# Patient Record
Sex: FEMALE | NOT HISPANIC OR LATINO | Employment: UNEMPLOYED | URBAN - METROPOLITAN AREA
[De-identification: names, ages, dates, MRNs, and addresses within clinical notes are randomized per-mention and may not be internally consistent; named-entity substitution may affect disease eponyms.]

---

## 2017-08-02 LAB — HUMAN PAPILLOMAVIRUS (HPV): NORMAL

## 2018-09-27 ENCOUNTER — CLINICAL SUPPORT (OUTPATIENT)
Dept: URGENT CARE | Facility: CLINIC | Age: 63
End: 2018-09-27
Payer: COMMERCIAL

## 2018-09-27 VITALS
HEART RATE: 67 BPM | OXYGEN SATURATION: 98 % | WEIGHT: 187 LBS | SYSTOLIC BLOOD PRESSURE: 132 MMHG | TEMPERATURE: 99 F | DIASTOLIC BLOOD PRESSURE: 77 MMHG | RESPIRATION RATE: 14 BRPM | BODY MASS INDEX: 28.34 KG/M2 | HEIGHT: 68 IN

## 2018-09-27 DIAGNOSIS — L01.00 IMPETIGO: ICD-10-CM

## 2018-09-27 DIAGNOSIS — L01.00 IMPETIGO ANY SITE: Primary | ICD-10-CM

## 2018-09-27 PROCEDURE — 99203 OFFICE O/P NEW LOW 30 MIN: CPT | Mod: S$GLB,,, | Performed by: NURSE PRACTITIONER

## 2018-09-27 RX ORDER — SULFAMETHOXAZOLE AND TRIMETHOPRIM 800; 160 MG/1; MG/1
1 TABLET ORAL 2 TIMES DAILY
Qty: 20 TABLET | Refills: 0 | Status: SHIPPED | OUTPATIENT
Start: 2018-09-27 | End: 2018-10-07

## 2018-09-27 RX ORDER — MUPIROCIN CALCIUM 20 MG/G
CREAM TOPICAL
Qty: 30 G | Refills: 0 | Status: SHIPPED | OUTPATIENT
Start: 2018-09-27 | End: 2019-08-01

## 2018-09-27 NOTE — PATIENT INSTRUCTIONS
"  Impetigo  Impetigo is a common bacterial infection of the skin that can appear on many parts of the body. It can happen to anyone, of any age, but is more common in children. For this reason, it used to be called "school sores."  Causes  Its normal to get scrapes on your body from activity or from scratching your skin. The skin normally has bacteria on it. Sometimes an impetigo infection can start on healthy skin. But it usually starts when there is an injury to the skin, or break in the skin. Although nothing usually happens, the bacteria normally on the skin can cause infection. This is the most common way people get impetigo.  Impetigo is very contagious. So once there is an infection, it needs to be treated so it doesn't get worse, spread to other areas, or to other people. Impetigo can easily be passed to other family members, friends, schoolmates, or co-workers, through scratching, rubbing, or touching an infected area. Common causes include:  · After a cold  · Bites  · From another infected person  · Injury to skin  · Insect bites  · Other skin problems that are infected, such as eczema  · Scratches  Symptoms  There is often a skin injury like a scratch, scrape, or insect bite that may have gone unnoticed or been ignored before the infection began. Symptoms of impetigo include:  · Red, inflamed area or rash  · One or many red bumps  · Bumps that turn into blisters filled with yellow fluid or pus  · Blisters break or leak causing honey-colored crusting or scabbing over the area  · Skin sores that spread to other surrounding areas  Home care  The following guidelines will help you care for your infection at home.  Wound care  · Trim fingernails and cover sores with an adhesive bandage, if needed, to prevent scratching. Picking at the sores may leave a scar.  · If the infection is on or around your lips, don't lick or chew on the sores. This will make the infection worse.  · If a bandage or dressing is used, " you can put a nonstick dressing over it.  · Wash your hands and your childs hands often. This will avoid spreading the infection to other parts of the body and to other people. Do not share the infected persons washcloths, towels, pillows, sheets, or clothes with others. Wash these items in hot water before using again.  · Clean the area several times a day. You dont want to scrub the area. The best way to do this is to soak the sores in warm, soapy water until they get soft enough to be wiped away. This will help remove the crust that forms from the dried liquid. In areas that you cant soak, like the mouth or face, you can put a clean, warm washcloth over the infected are for 5 to 10 minutes at a time, until the scabs soften enough to remove.  Medicines  · You can use over-the-counter medicine as directed based on age and weight for pain, fever, fussiness, or discomfort, unless another medicine was prescribed. In infants ages 6 months and older, you may use ibuprofen as well as acetaminophen. You can alternate them, or use both together. They work differently and are a different class of medicines, so taking them together is not an overdose. If you or your child has chronic liver or kidney disease or ever had a stomach ulcer or gastrointestinal bleeding, talk with your healthcare provider before using these medicines. Also talk with your healthcare provider if your child is taking blood-thinner medicines.  · Do not give aspirin to your child. Aspirin should never be used in children ages 18 and younger who is ill with a fever. It may cause severe disease or death.   · Impetigo can often be cured with topical creams. Apply these as directed by your healthcare provider.  · If you were given oral antibiotics, take them until they are used up. It is important to finish the antibiotics even if the wound looks better to make sure the infection has cleared.  Follow-up care  Follow up with your healthcare provider if  the sores continue to spread after 3 days of treatment. It will take about 7 to 10 days to heal completely.  Your child should stay out of school until completing 2 full days of antibiotic treatment.  When to seek medical advice  Call your healthcare provider right away if any of the following occur:  · Fever of 100.4°F (38°C) or higher, or as directed  · Increased amounts of fluid or pus coming from the sores  · Increasing number of sores or spreading areas of redness after 2 days of treatment with antibiotics  · Increasing swelling or pain  · Loss of appetite or vomiting  · Unusual drowsiness, weakness, or change in behavior  Date Last Reviewed: 8/1/2016 © 2000-2017 [employer]. [address] [place] [address], [place] [address]. All rights reserved. This information is not intended as a substitute for professional medical care. Always follow your healthcare professional's instructions.

## 2018-09-27 NOTE — PROGRESS NOTES
"Subjective:       Patient ID: Bailey Joseph is a 63 y.o. female.    Vitals:  height is 5' 8" (1.727 m) and weight is 84.8 kg (187 lb). Her oral temperature is 98.6 °F (37 °C). Her blood pressure is 132/77 and her pulse is 67. Her respiration is 14 and oxygen saturation is 98%.     Chief Complaint: Rash (exposed to impetigo, has lesion under right arm)    Rash   This is a new problem. The current episode started yesterday. The problem is unchanged. The affected locations include the right axilla. Pertinent negatives include no fever, joint pain, shortness of breath or sore throat.   Recently exposed to impetigo  Review of Systems   Constitution: Negative for chills and fever.   HENT: Negative for sore throat.    Respiratory: Negative for shortness of breath.    Skin: Positive for rash. Negative for itching.   Musculoskeletal: Negative for joint pain.       Objective:      Physical Exam   Constitutional: She is oriented to person, place, and time. She appears well-developed and well-nourished.   HENT:   Head: Normocephalic and atraumatic. Head is without abrasion, without contusion and without laceration.   Right Ear: External ear normal.   Left Ear: External ear normal.   Nose: Nose normal.   Mouth/Throat: Oropharynx is clear and moist.   Eyes: Conjunctivae, EOM and lids are normal. Pupils are equal, round, and reactive to light.   Neck: Trachea normal, full passive range of motion without pain and phonation normal. Neck supple.   Cardiovascular: Normal rate, regular rhythm and normal heart sounds.   Pulmonary/Chest: Effort normal and breath sounds normal. No stridor. No respiratory distress.   Musculoskeletal: Normal range of motion.   Neurological: She is alert and oriented to person, place, and time.   Skin: Skin is warm, dry and intact. Capillary refill takes less than 2 seconds. No abrasion, no bruising, no burn, no ecchymosis, no laceration, no lesion and no rash noted. No erythema.   Small erythematous vesicle " to right lateral chest wall. Non painful.   Psychiatric: She has a normal mood and affect. Her speech is normal and behavior is normal. Judgment and thought content normal. Cognition and memory are normal.   Nursing note and vitals reviewed.      Assessment:       No diagnosis found.    Plan:         There are no diagnoses linked to this encounter.

## 2019-08-01 ENCOUNTER — DOCUMENTATION ONLY (OUTPATIENT)
Dept: FAMILY MEDICINE | Facility: CLINIC | Age: 64
End: 2019-08-01

## 2019-08-01 ENCOUNTER — OFFICE VISIT (OUTPATIENT)
Dept: FAMILY MEDICINE | Facility: CLINIC | Age: 64
End: 2019-08-01
Payer: COMMERCIAL

## 2019-08-01 VITALS
OXYGEN SATURATION: 98 % | WEIGHT: 194 LBS | HEIGHT: 68 IN | SYSTOLIC BLOOD PRESSURE: 108 MMHG | TEMPERATURE: 98 F | DIASTOLIC BLOOD PRESSURE: 62 MMHG | HEART RATE: 88 BPM | BODY MASS INDEX: 29.4 KG/M2

## 2019-08-01 DIAGNOSIS — R19.4 CHANGE IN BOWEL HABITS: ICD-10-CM

## 2019-08-01 DIAGNOSIS — Z13.228 SCREENING FOR ENDOCRINE, NUTRITIONAL, METABOLIC AND IMMUNITY DISORDER: ICD-10-CM

## 2019-08-01 DIAGNOSIS — Z23 NEED FOR DIPHTHERIA-TETANUS-PERTUSSIS (TDAP) VACCINE: ICD-10-CM

## 2019-08-01 DIAGNOSIS — Z13.1 DIABETES MELLITUS SCREENING: ICD-10-CM

## 2019-08-01 DIAGNOSIS — Z13.0 SCREENING FOR IRON DEFICIENCY ANEMIA: ICD-10-CM

## 2019-08-01 DIAGNOSIS — Z12.39 BREAST CANCER SCREENING: ICD-10-CM

## 2019-08-01 DIAGNOSIS — Z13.220 ENCOUNTER FOR LIPID SCREENING FOR CARDIOVASCULAR DISEASE: ICD-10-CM

## 2019-08-01 DIAGNOSIS — Z13.29 SCREENING FOR ENDOCRINE, NUTRITIONAL, METABOLIC AND IMMUNITY DISORDER: ICD-10-CM

## 2019-08-01 DIAGNOSIS — Z13.21 SCREENING FOR ENDOCRINE, NUTRITIONAL, METABOLIC AND IMMUNITY DISORDER: ICD-10-CM

## 2019-08-01 DIAGNOSIS — Z00.00 PHYSICAL EXAM, ROUTINE: Primary | ICD-10-CM

## 2019-08-01 DIAGNOSIS — Z11.59 ENCOUNTER FOR HEPATITIS C SCREENING TEST FOR LOW RISK PATIENT: ICD-10-CM

## 2019-08-01 DIAGNOSIS — Z13.6 ENCOUNTER FOR LIPID SCREENING FOR CARDIOVASCULAR DISEASE: ICD-10-CM

## 2019-08-01 DIAGNOSIS — Z13.0 SCREENING FOR ENDOCRINE, NUTRITIONAL, METABOLIC AND IMMUNITY DISORDER: ICD-10-CM

## 2019-08-01 DIAGNOSIS — Z23 NEED FOR SHINGLES VACCINE: ICD-10-CM

## 2019-08-01 PROCEDURE — 99386 PR PREVENTIVE VISIT,NEW,40-64: ICD-10-PCS | Mod: 25,S$GLB,, | Performed by: NURSE PRACTITIONER

## 2019-08-01 PROCEDURE — 99386 PREV VISIT NEW AGE 40-64: CPT | Mod: 25,S$GLB,, | Performed by: NURSE PRACTITIONER

## 2019-08-01 PROCEDURE — 90715 TDAP VACCINE 7 YRS/> IM: CPT | Mod: S$GLB,,, | Performed by: NURSE PRACTITIONER

## 2019-08-01 PROCEDURE — 90715 TDAP VACCINE GREATER THAN OR EQUAL TO 7YO IM: ICD-10-PCS | Mod: S$GLB,,, | Performed by: NURSE PRACTITIONER

## 2019-08-01 PROCEDURE — 90471 IMMUNIZATION ADMIN: CPT | Mod: S$GLB,,, | Performed by: NURSE PRACTITIONER

## 2019-08-01 PROCEDURE — 90471 TDAP VACCINE GREATER THAN OR EQUAL TO 7YO IM: ICD-10-PCS | Mod: S$GLB,,, | Performed by: NURSE PRACTITIONER

## 2019-08-01 NOTE — PROGRESS NOTES
Subjective:       Patient ID: Bailey Joseph is a 64 y.o. female.    Chief Complaint: Annual Exam  New patient who is here for a wellness exam. She has never been seen at [facility] or myself.   She has no complaints at this time, but does have an extensive family history of multiple diseases including cancer and diabetes. She would like to have her annual labs done. In the past she had a low white blood cell count, but was not found to have any major illness at [facility] related to the low WBCs. Has a history of breast problems, has a marker where they did a biopsy in the past, but last mammogram was normal, is due now. She had a pap about a year and a half ago and it was normal (with Dr. John). She has never had a colonoscopy. She has always had daily bowel movements, but in the past few weeks she has had a change in bowel movements, going from diarrhea to constipation and back.   HPI  Review of Systems   Constitutional: Negative for fatigue, fever and unexpected weight change.   HENT: Positive for hearing loss and tinnitus. Negative for congestion and sore throat.    Eyes: Negative for pain, redness and visual disturbance.   Respiratory: Negative for cough and shortness of breath.    Cardiovascular: Positive for leg swelling. Negative for chest pain.   Gastrointestinal: Positive for constipation and diarrhea. Negative for nausea and vomiting.   Endocrine: Negative for cold intolerance and heat intolerance.   Genitourinary: Negative for dysuria and hematuria.   Musculoskeletal: Negative for arthralgias and myalgias.   Skin: Negative for pallor and rash.   Neurological: Negative for dizziness and headaches.   Psychiatric/Behavioral: Negative for dysphoric mood. The patient is not nervous/anxious.        Objective:      Physical Exam   Constitutional: She is oriented to person, place, and time. She appears well-developed and well-nourished. No distress.   HENT:   Head: Normocephalic and atraumatic.    Right Ear: External ear normal.   Left Ear: External ear normal.   Mouth/Throat: Oropharynx is clear and moist. No oropharyngeal exudate.   TMs pearly grey with light reflex bilaterally.      Eyes: Pupils are equal, round, and reactive to light. Conjunctivae and EOM are normal. Right eye exhibits no discharge. Left eye exhibits no discharge. No scleral icterus.   Neck: Normal range of motion. Neck supple.   Cardiovascular: Normal rate, regular rhythm, normal heart sounds and intact distal pulses. Exam reveals no gallop and no friction rub.   No murmur heard.  Pulmonary/Chest: Effort normal and breath sounds normal. No stridor. No respiratory distress. She has no wheezes. She has no rales. She exhibits no tenderness.   Abdominal: Soft. Bowel sounds are normal. She exhibits no distension and no mass. There is no tenderness. There is no rebound and no guarding.   Musculoskeletal: She exhibits edema.   1+ non-pitting edema bilateral lower legs.    Neurological: She is alert and oriented to person, place, and time.   Skin: Skin is warm and dry. Capillary refill takes less than 2 seconds. No rash noted. She is not diaphoretic. No erythema. No pallor.   Psychiatric: She has a normal mood and affect. Her behavior is normal.   Nursing note and vitals reviewed.      Assessment:     This office visit cannot be billed incident to as it does not meet the needed criteria.     1. Physical exam, routine    2. Need for diphtheria-tetanus-pertussis (Tdap) vaccine    3. Encounter for hepatitis C screening test for low risk patient    4. Screening for endocrine, nutritional, metabolic and immunity disorder    5. Diabetes mellitus screening    6. Encounter for lipid screening for cardiovascular disease    7. Screening for iron deficiency anemia    8. Change in bowel habits    9. Breast cancer screening    10. Need for shingles vaccine        Plan:       Physical exam, routine    Need for diphtheria-tetanus-pertussis (Tdap) vaccine  -      (In Office Administered) Tdap Vaccine    Encounter for hepatitis C screening test for low risk patient  -     Hepatitis C antibody; Future; Expected date: 08/01/2019    Screening for endocrine, nutritional, metabolic and immunity disorder  -     TSH; Future; Expected date: 08/01/2019  -     Comprehensive metabolic panel; Future; Expected date: 08/01/2019    Diabetes mellitus screening  -     Hemoglobin A1c; Future; Expected date: 08/01/2019    Encounter for lipid screening for cardiovascular disease  -     Lipid panel; Future; Expected date: 08/01/2019    Screening for iron deficiency anemia  -     CBC auto differential; Future; Expected date: 08/01/2019    Change in bowel habits  -     Ambulatory referral to Gastroenterology    Breast cancer screening  -     Mammo Digital Screening Bilat; Future; Expected date: 08/01/2019    Need for shingles vaccine  -     varicella-zoster gE vac,2 of 2 50 mcg SusR; Inject 0.5 mLs into the muscle once. for 1 dose  Dispense: 1 each; Refill: 0  -     adjuvant AS01B, PF,vial 1 of 2 Susp; Inject 0.5 mLs into the muscle once. for 1 dose  Dispense: 0.5 mL; Refill: 0         Work on losing 20 pounds, slow and steady, 1-2 pounds a week.

## 2019-08-01 NOTE — PROGRESS NOTES
Health Maintenance Due   Topic Date Due    Hepatitis C Screening  1955    Lipid Panel  1955    TETANUS VACCINE  06/10/1975    Pap Smear with HPV Cotest  06/10/1978    Mammogram  06/10/1997    Colonoscopy  06/10/2007

## 2019-08-02 ENCOUNTER — LAB VISIT (OUTPATIENT)
Dept: LAB | Facility: HOSPITAL | Age: 64
End: 2019-08-02
Attending: NURSE PRACTITIONER
Payer: COMMERCIAL

## 2019-08-02 DIAGNOSIS — Z13.1 DIABETES MELLITUS SCREENING: ICD-10-CM

## 2019-08-02 DIAGNOSIS — Z13.6 ENCOUNTER FOR LIPID SCREENING FOR CARDIOVASCULAR DISEASE: ICD-10-CM

## 2019-08-02 DIAGNOSIS — Z13.21 SCREENING FOR ENDOCRINE, NUTRITIONAL, METABOLIC AND IMMUNITY DISORDER: ICD-10-CM

## 2019-08-02 DIAGNOSIS — Z13.0 SCREENING FOR ENDOCRINE, NUTRITIONAL, METABOLIC AND IMMUNITY DISORDER: ICD-10-CM

## 2019-08-02 DIAGNOSIS — Z13.228 SCREENING FOR ENDOCRINE, NUTRITIONAL, METABOLIC AND IMMUNITY DISORDER: ICD-10-CM

## 2019-08-02 DIAGNOSIS — Z13.220 ENCOUNTER FOR LIPID SCREENING FOR CARDIOVASCULAR DISEASE: ICD-10-CM

## 2019-08-02 DIAGNOSIS — Z13.0 SCREENING FOR IRON DEFICIENCY ANEMIA: ICD-10-CM

## 2019-08-02 DIAGNOSIS — Z11.59 ENCOUNTER FOR HEPATITIS C SCREENING TEST FOR LOW RISK PATIENT: ICD-10-CM

## 2019-08-02 DIAGNOSIS — Z13.29 SCREENING FOR ENDOCRINE, NUTRITIONAL, METABOLIC AND IMMUNITY DISORDER: ICD-10-CM

## 2019-08-02 LAB
ALBUMIN SERPL BCP-MCNC: 3.7 G/DL (ref 3.5–5.2)
ALP SERPL-CCNC: 72 U/L (ref 55–135)
ALT SERPL W/O P-5'-P-CCNC: 27 U/L (ref 10–44)
ANION GAP SERPL CALC-SCNC: 8 MMOL/L (ref 8–16)
AST SERPL-CCNC: 29 U/L (ref 10–40)
BASOPHILS # BLD AUTO: 0.03 K/UL (ref 0–0.2)
BASOPHILS NFR BLD: 0.8 % (ref 0–1.9)
BILIRUB SERPL-MCNC: 0.2 MG/DL (ref 0.1–1)
BUN SERPL-MCNC: 14 MG/DL (ref 8–23)
CALCIUM SERPL-MCNC: 9.3 MG/DL (ref 8.7–10.5)
CHLORIDE SERPL-SCNC: 107 MMOL/L (ref 95–110)
CHOLEST SERPL-MCNC: 170 MG/DL (ref 120–199)
CHOLEST/HDLC SERPL: 4 {RATIO} (ref 2–5)
CO2 SERPL-SCNC: 27 MMOL/L (ref 23–29)
CREAT SERPL-MCNC: 0.9 MG/DL (ref 0.5–1.4)
DIFFERENTIAL METHOD: ABNORMAL
EOSINOPHIL # BLD AUTO: 0.2 K/UL (ref 0–0.5)
EOSINOPHIL NFR BLD: 4.5 % (ref 0–8)
ERYTHROCYTE [DISTWIDTH] IN BLOOD BY AUTOMATED COUNT: 14.2 % (ref 11.5–14.5)
EST. GFR  (AFRICAN AMERICAN): >60 ML/MIN/1.73 M^2
EST. GFR  (NON AFRICAN AMERICAN): >60 ML/MIN/1.73 M^2
ESTIMATED AVG GLUCOSE: 128 MG/DL (ref 68–131)
GLUCOSE SERPL-MCNC: 94 MG/DL (ref 70–110)
HBA1C MFR BLD HPLC: 6.1 % (ref 4–5.6)
HCT VFR BLD AUTO: 37.1 % (ref 37–48.5)
HDLC SERPL-MCNC: 42 MG/DL (ref 40–75)
HDLC SERPL: 24.7 % (ref 20–50)
HGB BLD-MCNC: 11.6 G/DL (ref 12–16)
IMM GRANULOCYTES # BLD AUTO: 0 K/UL (ref 0–0.04)
LDLC SERPL CALC-MCNC: 111.8 MG/DL (ref 63–159)
LYMPHOCYTES # BLD AUTO: 2.2 K/UL (ref 1–4.8)
LYMPHOCYTES NFR BLD: 58 % (ref 18–48)
MCH RBC QN AUTO: 28.2 PG (ref 27–31)
MCHC RBC AUTO-ENTMCNC: 31.3 G/DL (ref 32–36)
MCV RBC AUTO: 90 FL (ref 82–98)
MONOCYTES # BLD AUTO: 0.5 K/UL (ref 0.3–1)
MONOCYTES NFR BLD: 13.2 % (ref 4–15)
NEUTROPHILS # BLD AUTO: 0.9 K/UL (ref 1.8–7.7)
NEUTROPHILS NFR BLD: 23.5 % (ref 38–73)
NONHDLC SERPL-MCNC: 128 MG/DL
NRBC BLD-RTO: 0 /100 WBC
PLATELET # BLD AUTO: 276 K/UL (ref 150–350)
PMV BLD AUTO: 10.5 FL (ref 9.2–12.9)
POTASSIUM SERPL-SCNC: 4.1 MMOL/L (ref 3.5–5.1)
PROT SERPL-MCNC: 7.9 G/DL (ref 6–8.4)
RBC # BLD AUTO: 4.12 M/UL (ref 4–5.4)
SODIUM SERPL-SCNC: 142 MMOL/L (ref 136–145)
TRIGL SERPL-MCNC: 81 MG/DL (ref 30–150)
TSH SERPL DL<=0.005 MIU/L-ACNC: 3.09 UIU/ML (ref 0.4–4)
WBC # BLD AUTO: 3.79 K/UL (ref 3.9–12.7)

## 2019-08-02 PROCEDURE — 85025 COMPLETE CBC W/AUTO DIFF WBC: CPT

## 2019-08-02 PROCEDURE — 84443 ASSAY THYROID STIM HORMONE: CPT

## 2019-08-02 PROCEDURE — 83036 HEMOGLOBIN GLYCOSYLATED A1C: CPT

## 2019-08-02 PROCEDURE — 86803 HEPATITIS C AB TEST: CPT

## 2019-08-02 PROCEDURE — 80053 COMPREHEN METABOLIC PANEL: CPT

## 2019-08-02 PROCEDURE — 80061 LIPID PANEL: CPT

## 2019-08-02 PROCEDURE — 36415 COLL VENOUS BLD VENIPUNCTURE: CPT

## 2019-08-05 ENCOUNTER — PATIENT MESSAGE (OUTPATIENT)
Dept: FAMILY MEDICINE | Facility: CLINIC | Age: 64
End: 2019-08-05

## 2019-08-05 LAB — HCV AB SERPL QL IA: NEGATIVE

## 2019-08-26 ENCOUNTER — HOSPITAL ENCOUNTER (OUTPATIENT)
Dept: RADIOLOGY | Facility: HOSPITAL | Age: 64
Discharge: HOME | End: 2019-08-26
Attending: NURSE PRACTITIONER
Payer: COMMERCIAL

## 2019-08-26 DIAGNOSIS — Z12.39 BREAST CANCER SCREENING: ICD-10-CM

## 2019-08-26 PROCEDURE — 77067 SCR MAMMO BI INCL CAD: CPT | Mod: TC

## 2019-08-26 PROCEDURE — 77067 SCR MAMMO BI INCL CAD: CPT | Mod: 26,,, | Performed by: RADIOLOGY

## 2019-08-26 PROCEDURE — 77063 BREAST TOMOSYNTHESIS BI: CPT | Mod: 26,,, | Performed by: RADIOLOGY

## 2019-08-26 PROCEDURE — 77067 MAMMO DIGITAL SCREENING BILAT WITH TOMOSYNTHESIS_CAD: ICD-10-PCS | Mod: 26,,, | Performed by: RADIOLOGY

## 2019-08-26 PROCEDURE — 77063 MAMMO DIGITAL SCREENING BILAT WITH TOMOSYNTHESIS_CAD: ICD-10-PCS | Mod: 26,,, | Performed by: RADIOLOGY

## 2019-08-30 ENCOUNTER — OFFICE VISIT (OUTPATIENT)
Dept: GASTROENTEROLOGY | Facility: CLINIC | Age: 64
End: 2019-08-30
Payer: COMMERCIAL

## 2019-08-30 ENCOUNTER — TELEPHONE (OUTPATIENT)
Dept: GASTROENTEROLOGY | Facility: CLINIC | Age: 64
End: 2019-08-30

## 2019-08-30 VITALS
HEART RATE: 58 BPM | DIASTOLIC BLOOD PRESSURE: 58 MMHG | WEIGHT: 192 LBS | BODY MASS INDEX: 29.2 KG/M2 | SYSTOLIC BLOOD PRESSURE: 123 MMHG

## 2019-08-30 DIAGNOSIS — D64.9 NORMOCYTIC ANEMIA: Primary | ICD-10-CM

## 2019-08-30 DIAGNOSIS — Z12.11 SCREENING FOR COLON CANCER: ICD-10-CM

## 2019-08-30 PROCEDURE — 99205 OFFICE O/P NEW HI 60 MIN: CPT | Mod: S$GLB,,, | Performed by: INTERNAL MEDICINE

## 2019-08-30 PROCEDURE — 99205 PR OFFICE/OUTPT VISIT, NEW, LEVL V, 60-74 MIN: ICD-10-PCS | Mod: S$GLB,,, | Performed by: INTERNAL MEDICINE

## 2019-08-30 PROCEDURE — 99999 PR PBB SHADOW E&M-EST. PATIENT-LVL III: CPT | Mod: PBBFAC,,, | Performed by: INTERNAL MEDICINE

## 2019-08-30 PROCEDURE — 99999 PR PBB SHADOW E&M-EST. PATIENT-LVL III: ICD-10-PCS | Mod: PBBFAC,,, | Performed by: INTERNAL MEDICINE

## 2019-08-30 NOTE — PROGRESS NOTES
[facility]     CC: Anemia    HPI 64 y.o. female presents for evaluation of mild, normocytic, lifelong anemia not associated with overt GI bleeding. Patient states she has been anemic her entire life and has never needed iron supplementation. She typically has 1 BM a day however had a 2 week period with alternating diarrhea and constipation that has now completely resolved. She is without complaint. She has no prior EGD or colonoscopy. She has no family history of colon cancer.     History reviewed. No pertinent past medical history.    Past Surgical History:   Procedure Laterality Date    BREAST BIOPSY      LIPOMA RESECTION      TONSILLECTOMY         Social History     Tobacco Use    Smoking status: Never Smoker    Smokeless tobacco: Never Used   Substance Use Topics    Alcohol use: Yes     Frequency: Monthly or less     Drinks per session: 3 or 4     Binge frequency: Less than monthly    Drug use: No       History reviewed. No pertinent family history.    Review of Systems  General ROS: negative for - chills, fever or weight loss  Psychological ROS: negative for - hallucination, depression or suicidal ideation  Ophthalmic ROS: negative for - blurry vision, photophobia or eye pain  ENT ROS: negative for - epistaxis, sore throat or rhinorrhea  Respiratory ROS: no cough, shortness of breath, or wheezing  Cardiovascular ROS: no chest pain or dyspnea on exertion  Gastrointestinal ROS: no blood in stool, no heartburn, no vomiting  Genito-Urinary ROS: no dysuria, trouble voiding, or hematuria  Musculoskeletal ROS: negative for - arthralgia, myalgia, weakness  Neurological ROS: no syncope or seizures; no ataxia  Dermatological ROS: negative for pruritis, rash and jaundice    Physical Examination  BP (!) 123/58   Pulse (!) 58   Wt 87.1 kg (192 lb 0.3 oz)   BMI 29.20 kg/m²   General appearance: alert, cooperative, no distress  HENT: Normocephalic, atraumatic, neck symmetrical, no nasal discharge    Eyes: conjunctivae/corneas clear, PERRL, EOM's intact, sclera anicteric  Lungs: clear to auscultation bilaterally, no dullness to percussion bilaterally, symmetric expansion, breathing unlabored  Heart: regular rate and rhythm without rub; no displacement of the PMI   Abdomen: soft, nontender,nondistended, BS active, no masses   Extremities: extremities symmetric; no clubbing, cyanosis, or edema  Integument: Skin color, texture, turgor normal; no rashes; hair distrubution normal, no jaundice  Neurologic: Alert and oriented X 3, no focal sensory or motor neurologic deficits  Psychiatric: no pressured speech; normal affect; no evidence of impaired cognition, no anxiety/depression     Labs:  Lab Results   Component Value Date    WBC 3.79 (L) 08/02/2019    HGB 11.6 (L) 08/02/2019    HCT 37.1 08/02/2019    MCV 90 08/02/2019     08/02/2019     CMP  Sodium   Date Value Ref Range Status   08/02/2019 142 136 - 145 mmol/L Final     Potassium   Date Value Ref Range Status   08/02/2019 4.1 3.5 - 5.1 mmol/L Final     Chloride   Date Value Ref Range Status   08/02/2019 107 95 - 110 mmol/L Final     CO2   Date Value Ref Range Status   08/02/2019 27 23 - 29 mmol/L Final     Glucose   Date Value Ref Range Status   08/02/2019 94 70 - 110 mg/dL Final     BUN, Bld   Date Value Ref Range Status   08/02/2019 14 8 - 23 mg/dL Final     Creatinine   Date Value Ref Range Status   08/02/2019 0.9 0.5 - 1.4 mg/dL Final     Calcium   Date Value Ref Range Status   08/02/2019 9.3 8.7 - 10.5 mg/dL Final     Total Protein   Date Value Ref Range Status   08/02/2019 7.9 6.0 - 8.4 g/dL Final     Albumin   Date Value Ref Range Status   08/02/2019 3.7 3.5 - 5.2 g/dL Final     Total Bilirubin   Date Value Ref Range Status   08/02/2019 0.2 0.1 - 1.0 mg/dL Final     Comment:     For infants and newborns, interpretation of results should be based  on gestational age, weight and in agreement with clinical  observations.  Premature Infant recommended  reference ranges:  Up to 24 hours.............<8.0 mg/dL  Up to 48 hours............<12.0 mg/dL  3-5 days..................<15.0 mg/dL  6-29 days.................<15.0 mg/dL       Alkaline Phosphatase   Date Value Ref Range Status   08/02/2019 72 55 - 135 U/L Final     AST   Date Value Ref Range Status   08/02/2019 29 10 - 40 U/L Final     ALT   Date Value Ref Range Status   08/02/2019 27 10 - 44 U/L Final     Anion Gap   Date Value Ref Range Status   08/02/2019 8 8 - 16 mmol/L Final     eGFR if    Date Value Ref Range Status   08/02/2019 >60 >60 mL/min/1.73 m^2 Final     eGFR if non    Date Value Ref Range Status   08/02/2019 >60 >60 mL/min/1.73 m^2 Final     Comment:     Calculation used to obtain the estimated glomerular filtration  rate (eGFR) is the CKD-EPI equation.            Assessment:   64 y.o. female with mild lifelong normocytic anemia presents for evaluation. She has no prior colonoscopy.   Plan:  -Iron, TIBC, Ferritin, Folate, Vitamin B12  -Schedule screening colonoscopy (If SINGH will change to diagnostic and add EGD)    Angelica Washburn MD  [facility]  [address], [address]  [place] [address]  Office: [phone]  Fax: [phone]

## 2019-08-30 NOTE — TELEPHONE ENCOUNTER
----- Message from Aniya Solorio sent at 8/30/2019  2:46 PM CDT -----  Contact: Bailey  Type: Needs Medical Advice    Who Called:  patient  Best Call Back Number: [phone] (home)   Additional Information: would like to know if she can change appt for her Colonoscopy on 9/13 to 9/27 if possible--please advise--thank you

## 2019-08-30 NOTE — LETTER
August 30, 2019      Oksana Gaitan, APRN  [id number]  [facility] [address]           [facility]  [address]  [facility] [place] [address]  Phone: [phone]          Patient: Bailey Joseph   MR Number: [id number]   YOB: 1955   Date of Visit: 8/30/2019       Dear Oksana Gaitan:    Thank you for referring Bailey Joseph to me for evaluation. Attached you will find relevant portions of my assessment and plan of care.    If you have questions, please do not hesitate to call me. I look forward to following Bailey Joseph along with you.    Sincerely,    Angelica Washburn MD    Enclosure  CC:  No Recipients    If you would like to receive this communication electronically, please contact [email or web address] or [phone] to request more information on [employer] access.    For providers and/or their staff who would like to refer a patient to [facility], please contact us through our one-stop-shop provider referral line, Mendy Mcgrath, at [phone].    If you feel you have received this communication in error or would no longer like to receive these types of communications, please e-mail [email or web address]

## 2019-09-03 ENCOUNTER — PATIENT MESSAGE (OUTPATIENT)
Dept: FAMILY MEDICINE | Facility: CLINIC | Age: 64
End: 2019-09-03

## 2019-10-16 ENCOUNTER — TELEPHONE (OUTPATIENT)
Dept: GASTROENTEROLOGY | Facility: CLINIC | Age: 64
End: 2019-10-16

## 2019-10-16 NOTE — TELEPHONE ENCOUNTER
Talked with patient to let her know when she's ready to reschedule colonoscopy to call and this can be done over phone or if prefers she can make Nurse visit. Verified she still has her prep information.

## 2019-10-16 NOTE — TELEPHONE ENCOUNTER
----- Message from Bindu Jones sent at 10/16/2019  8:22 AM CDT -----  Contact: pt [phone]  Patient called to cancel her procedure for now she will call back to reschedule.

## 2020-11-19 NOTE — PROGRESS NOTES
"Subjective:      5:43 PM     Patient ID: Bailey Joseph is a 65 y.o. female.    Chief Complaint: Shoulder Pain (few weeks) and Back Pain (this morning)  Patient was seen 15 months ago for routine annual exam. Had labs with abnormalities, did not follow up.   Low-back Pain  This is a new problem. The current episode started today (started when she was bending loading the . ). The problem occurs constantly. Pertinent negatives include no change in bowel habit, fever, numbness or urinary symptoms. Rash: shoulder. Associated symptoms comments: Denies leg pain bilaterally. . Exacerbated by: sitting. She has tried NSAIDs for the symptoms. The treatment provided no relief.   Shoulder Pain   The pain is present in the left shoulder. This is a new problem. The current episode started 1 to 4 weeks ago (started about 2 weeks ago). There has been no history of extremity trauma. The problem occurs intermittently. The problem has been gradually worsening. The quality of the pain is described as sharp. Associated symptoms include a limited range of motion. Pertinent negatives include no fever, joint swelling, numbness or tingling. The symptoms are aggravated by activity. She has tried nothing for the symptoms. The treatment provided no relief.     Review of Systems   Constitutional: Negative for fever.   Gastrointestinal: Negative for change in bowel habit.   Skin: Rash: shoulder.   Neurological: Negative for tingling and numbness.         Objective:      Vitals:    11/20/20 1310   BP: 104/60   Pulse: 84   Resp: 16   Temp: 98.9 °F (37.2 °C)   TempSrc: Temporal   SpO2: 98%   Weight: 90.8 kg (200 lb 2.8 oz)   Height: 5' 8" (1.727 m)   PainSc:   8     No results found for this or any previous visit (from the past 1008 hour(s)).      Lab Results   Component Value Date    HGBA1C 6.1 (H) 08/02/2019     Lab Results   Component Value Date    WBC 3.79 (L) 08/02/2019    HGB 11.6 (L) 08/02/2019    HCT 37.1 08/02/2019    MCV 90 " 08/02/2019     08/02/2019     Lab Results   Component Value Date    TSH 3.092 08/02/2019       CMP  Sodium   Date Value Ref Range Status   08/02/2019 142 136 - 145 mmol/L Final     Potassium   Date Value Ref Range Status   08/02/2019 4.1 3.5 - 5.1 mmol/L Final     Chloride   Date Value Ref Range Status   08/02/2019 107 95 - 110 mmol/L Final     CO2   Date Value Ref Range Status   08/02/2019 27 23 - 29 mmol/L Final     Glucose   Date Value Ref Range Status   08/02/2019 94 70 - 110 mg/dL Final     BUN   Date Value Ref Range Status   08/02/2019 14 8 - 23 mg/dL Final     Creatinine   Date Value Ref Range Status   08/02/2019 0.9 0.5 - 1.4 mg/dL Final     Calcium   Date Value Ref Range Status   08/02/2019 9.3 8.7 - 10.5 mg/dL Final     Total Protein   Date Value Ref Range Status   08/02/2019 7.9 6.0 - 8.4 g/dL Final     Albumin   Date Value Ref Range Status   08/02/2019 3.7 3.5 - 5.2 g/dL Final     Total Bilirubin   Date Value Ref Range Status   08/02/2019 0.2 0.1 - 1.0 mg/dL Final     Comment:     For infants and newborns, interpretation of results should be based  on gestational age, weight and in agreement with clinical  observations.  Premature Infant recommended reference ranges:  Up to 24 hours.............<8.0 mg/dL  Up to 48 hours............<12.0 mg/dL  3-5 days..................<15.0 mg/dL  6-29 days.................<15.0 mg/dL       Alkaline Phosphatase   Date Value Ref Range Status   08/02/2019 72 55 - 135 U/L Final     AST   Date Value Ref Range Status   08/02/2019 29 10 - 40 U/L Final     ALT   Date Value Ref Range Status   08/02/2019 27 10 - 44 U/L Final     Anion Gap   Date Value Ref Range Status   08/02/2019 8 8 - 16 mmol/L Final     eGFR if    Date Value Ref Range Status   08/02/2019 >60 >60 mL/min/1.73 m^2 Final     eGFR if non    Date Value Ref Range Status   08/02/2019 >60 >60 mL/min/1.73 m^2 Final     Comment:     Calculation used to obtain the estimated  glomerular filtration  rate (eGFR) is the CKD-EPI equation.        Lab Results   Component Value Date    CHOL 170 08/02/2019     Lab Results   Component Value Date    HDL 42 08/02/2019     Lab Results   Component Value Date    LDLCALC 111.8 08/02/2019     Lab Results   Component Value Date    TRIG 81 08/02/2019     Lab Results   Component Value Date    CHOLHDL 24.7 08/02/2019     Physical Exam  Vitals signs and nursing note reviewed.   Constitutional:       General: She is not in acute distress.     Appearance: Normal appearance. She is well-developed. She is not diaphoretic.   HENT:      Head: Normocephalic and atraumatic.   Eyes:      General: No scleral icterus.        Right eye: No discharge.         Left eye: No discharge.      Conjunctiva/sclera: Conjunctivae normal.   Cardiovascular:      Rate and Rhythm: Normal rate and regular rhythm.      Heart sounds: Normal heart sounds. No murmur. No friction rub. No gallop.    Pulmonary:      Effort: Pulmonary effort is normal. No respiratory distress.      Breath sounds: Normal breath sounds. No stridor. No wheezing, rhonchi or rales.   Musculoskeletal:         General: Tenderness present. No swelling or signs of injury.      Comments: Left arm neers positive, unable to abduct arm more than 40 degrees and unable to put arm behind back.  Has no low back tenderness with palpation.    Skin:     General: Skin is warm and dry.      Capillary Refill: Capillary refill takes less than 2 seconds.      Coloration: Skin is not jaundiced or pale.      Findings: No rash.   Neurological:      Mental Status: She is alert and oriented to person, place, and time.   Psychiatric:         Mood and Affect: Mood normal.         Behavior: Behavior normal.           Assessment:       1. Acute left-sided low back pain without sciatica    2. Anemia, unspecified type    3. Screening for endocrine, metabolic and immunity disorder    4. Pre-diabetes    5. Encounter for lipid screening for  cardiovascular disease    6. Left shoulder strain, initial encounter          Plan:       Acute left-sided low back pain without sciatica  -     tiZANidine (ZANAFLEX) 2 MG tablet; Take 1 tablet (2 mg total) by mouth every 8 (eight) hours as needed.  Dispense: 60 tablet; Refill: 0  -     naproxen (NAPROSYN) 500 MG tablet; Take 1 tablet (500 mg total) by mouth 2 (two) times daily with meals.  Dispense: 40 tablet; Refill: 0    Anemia, unspecified type  -     CBC Auto Differential; Future; Expected date: 11/20/2020    Screening for endocrine, metabolic and immunity disorder  -     TSH; Future; Expected date: 11/20/2020  -     Comprehensive Metabolic Panel; Future; Expected date: 11/20/2020    Pre-diabetes  -     Hemoglobin A1C; Future; Expected date: 11/20/2020    Encounter for lipid screening for cardiovascular disease  -     Lipid Panel; Future; Expected date: 11/20/2020    Left shoulder strain, initial encounter  -     naproxen (NAPROSYN) 500 MG tablet; Take 1 tablet (500 mg total) by mouth 2 (two) times daily with meals.  Dispense: 40 tablet; Refill: 0      Will see Zandra Contreras for pap in January.   1 month with labs prior

## 2020-11-20 ENCOUNTER — OFFICE VISIT (OUTPATIENT)
Dept: FAMILY MEDICINE | Facility: CLINIC | Age: 65
End: 2020-11-20
Payer: COMMERCIAL

## 2020-11-20 VITALS
BODY MASS INDEX: 30.34 KG/M2 | RESPIRATION RATE: 16 BRPM | WEIGHT: 200.19 LBS | HEART RATE: 84 BPM | HEIGHT: 68 IN | TEMPERATURE: 99 F | SYSTOLIC BLOOD PRESSURE: 104 MMHG | OXYGEN SATURATION: 98 % | DIASTOLIC BLOOD PRESSURE: 60 MMHG

## 2020-11-20 DIAGNOSIS — S46.912A LEFT SHOULDER STRAIN, INITIAL ENCOUNTER: ICD-10-CM

## 2020-11-20 DIAGNOSIS — Z13.228 SCREENING FOR ENDOCRINE, METABOLIC AND IMMUNITY DISORDER: ICD-10-CM

## 2020-11-20 DIAGNOSIS — Z13.0 SCREENING FOR ENDOCRINE, METABOLIC AND IMMUNITY DISORDER: ICD-10-CM

## 2020-11-20 DIAGNOSIS — M54.50 ACUTE LEFT-SIDED LOW BACK PAIN WITHOUT SCIATICA: Primary | ICD-10-CM

## 2020-11-20 DIAGNOSIS — R73.03 PRE-DIABETES: ICD-10-CM

## 2020-11-20 DIAGNOSIS — Z13.220 ENCOUNTER FOR LIPID SCREENING FOR CARDIOVASCULAR DISEASE: ICD-10-CM

## 2020-11-20 DIAGNOSIS — D64.9 ANEMIA, UNSPECIFIED TYPE: ICD-10-CM

## 2020-11-20 DIAGNOSIS — Z13.6 ENCOUNTER FOR LIPID SCREENING FOR CARDIOVASCULAR DISEASE: ICD-10-CM

## 2020-11-20 DIAGNOSIS — Z12.11 COLON CANCER SCREENING: ICD-10-CM

## 2020-11-20 DIAGNOSIS — Z13.29 SCREENING FOR ENDOCRINE, METABOLIC AND IMMUNITY DISORDER: ICD-10-CM

## 2020-11-20 PROCEDURE — 99213 OFFICE O/P EST LOW 20 MIN: CPT | Mod: S$GLB,,, | Performed by: NURSE PRACTITIONER

## 2020-11-20 PROCEDURE — 99213 PR OFFICE/OUTPT VISIT, EST, LEVL III, 20-29 MIN: ICD-10-PCS | Mod: S$GLB,,, | Performed by: NURSE PRACTITIONER

## 2020-11-20 RX ORDER — NAPROXEN 500 MG/1
500 TABLET ORAL 2 TIMES DAILY WITH MEALS
Qty: 40 TABLET | Refills: 0 | Status: SHIPPED | OUTPATIENT
Start: 2020-11-20 | End: 2020-12-21 | Stop reason: SDUPTHER

## 2020-11-20 RX ORDER — TIZANIDINE 2 MG/1
2 TABLET ORAL EVERY 8 HOURS PRN
Qty: 60 TABLET | Refills: 0 | Status: SHIPPED | OUTPATIENT
Start: 2020-11-20 | End: 2020-12-21

## 2020-11-20 NOTE — PATIENT INSTRUCTIONS
If you are asked to answer a survey from [facility], please do so and let them know how I did at your visit today.     Use ice on muscle spasm. 20 minutes on, 20 minutes off. Repeat as often as needed. Always use ice after exercising.  If you don't have an ice pack, Smart temp cold packs are sold at [facility] and other pharmacies.

## 2020-11-25 ENCOUNTER — DOCUMENTATION ONLY (OUTPATIENT)
Dept: FAMILY MEDICINE | Facility: CLINIC | Age: 65
End: 2020-11-25

## 2020-12-03 ENCOUNTER — LAB VISIT (OUTPATIENT)
Dept: LAB | Facility: HOSPITAL | Age: 65
End: 2020-12-03
Payer: COMMERCIAL

## 2020-12-03 DIAGNOSIS — Z12.11 COLON CANCER SCREENING: ICD-10-CM

## 2020-12-03 PROCEDURE — 82274 ASSAY TEST FOR BLOOD FECAL: CPT

## 2020-12-07 ENCOUNTER — PATIENT OUTREACH (OUTPATIENT)
Dept: ADMINISTRATIVE | Facility: HOSPITAL | Age: 65
End: 2020-12-07

## 2020-12-09 LAB — HEMOCCULT STL QL IA: NEGATIVE

## 2020-12-14 ENCOUNTER — LAB VISIT (OUTPATIENT)
Dept: LAB | Facility: HOSPITAL | Age: 65
End: 2020-12-14
Attending: NURSE PRACTITIONER
Payer: COMMERCIAL

## 2020-12-14 DIAGNOSIS — Z13.228 SCREENING FOR ENDOCRINE, METABOLIC AND IMMUNITY DISORDER: ICD-10-CM

## 2020-12-14 DIAGNOSIS — R73.03 PRE-DIABETES: ICD-10-CM

## 2020-12-14 DIAGNOSIS — D64.9 ANEMIA, UNSPECIFIED TYPE: ICD-10-CM

## 2020-12-14 DIAGNOSIS — Z13.220 ENCOUNTER FOR LIPID SCREENING FOR CARDIOVASCULAR DISEASE: ICD-10-CM

## 2020-12-14 DIAGNOSIS — Z13.0 SCREENING FOR ENDOCRINE, METABOLIC AND IMMUNITY DISORDER: ICD-10-CM

## 2020-12-14 DIAGNOSIS — Z13.29 SCREENING FOR ENDOCRINE, METABOLIC AND IMMUNITY DISORDER: ICD-10-CM

## 2020-12-14 DIAGNOSIS — Z13.6 ENCOUNTER FOR LIPID SCREENING FOR CARDIOVASCULAR DISEASE: ICD-10-CM

## 2020-12-14 LAB
ALBUMIN SERPL BCP-MCNC: 3.8 G/DL (ref 3.5–5.2)
ALP SERPL-CCNC: 72 U/L (ref 55–135)
ALT SERPL W/O P-5'-P-CCNC: 29 U/L (ref 10–44)
ANION GAP SERPL CALC-SCNC: 9 MMOL/L (ref 8–16)
AST SERPL-CCNC: 25 U/L (ref 10–40)
BASOPHILS # BLD AUTO: 0.05 K/UL (ref 0–0.2)
BASOPHILS NFR BLD: 1.4 % (ref 0–1.9)
BILIRUB SERPL-MCNC: 0.3 MG/DL (ref 0.1–1)
BUN SERPL-MCNC: 17 MG/DL (ref 8–23)
CALCIUM SERPL-MCNC: 9 MG/DL (ref 8.7–10.5)
CHLORIDE SERPL-SCNC: 104 MMOL/L (ref 95–110)
CHOLEST SERPL-MCNC: 172 MG/DL (ref 120–199)
CHOLEST/HDLC SERPL: 4 {RATIO} (ref 2–5)
CO2 SERPL-SCNC: 26 MMOL/L (ref 23–29)
CREAT SERPL-MCNC: 1 MG/DL (ref 0.5–1.4)
DIFFERENTIAL METHOD: ABNORMAL
EOSINOPHIL # BLD AUTO: 0.3 K/UL (ref 0–0.5)
EOSINOPHIL NFR BLD: 7.9 % (ref 0–8)
ERYTHROCYTE [DISTWIDTH] IN BLOOD BY AUTOMATED COUNT: 14.1 % (ref 11.5–14.5)
EST. GFR  (AFRICAN AMERICAN): >60 ML/MIN/1.73 M^2
EST. GFR  (NON AFRICAN AMERICAN): >60 ML/MIN/1.73 M^2
ESTIMATED AVG GLUCOSE: 126 MG/DL (ref 68–131)
GLUCOSE SERPL-MCNC: 94 MG/DL (ref 70–110)
HBA1C MFR BLD HPLC: 6 % (ref 4–5.6)
HCT VFR BLD AUTO: 36.5 % (ref 37–48.5)
HDLC SERPL-MCNC: 43 MG/DL (ref 40–75)
HDLC SERPL: 25 % (ref 20–50)
HGB BLD-MCNC: 11.5 G/DL (ref 12–16)
IMM GRANULOCYTES # BLD AUTO: 0 K/UL (ref 0–0.04)
IMM GRANULOCYTES NFR BLD AUTO: 0 % (ref 0–0.5)
LDLC SERPL CALC-MCNC: 112.8 MG/DL (ref 63–159)
LYMPHOCYTES # BLD AUTO: 2 K/UL (ref 1–4.8)
LYMPHOCYTES NFR BLD: 57 % (ref 18–48)
MCH RBC QN AUTO: 28.8 PG (ref 27–31)
MCHC RBC AUTO-ENTMCNC: 31.5 G/DL (ref 32–36)
MCV RBC AUTO: 91 FL (ref 82–98)
MONOCYTES # BLD AUTO: 0.5 K/UL (ref 0.3–1)
MONOCYTES NFR BLD: 12.9 % (ref 4–15)
NEUTROPHILS # BLD AUTO: 0.7 K/UL (ref 1.8–7.7)
NEUTROPHILS NFR BLD: 20.8 % (ref 38–73)
NONHDLC SERPL-MCNC: 129 MG/DL
NRBC BLD-RTO: 0 /100 WBC
PLATELET # BLD AUTO: 265 K/UL (ref 150–350)
PMV BLD AUTO: 9.8 FL (ref 9.2–12.9)
POTASSIUM SERPL-SCNC: 4.3 MMOL/L (ref 3.5–5.1)
PROT SERPL-MCNC: 7.9 G/DL (ref 6–8.4)
RBC # BLD AUTO: 4 M/UL (ref 4–5.4)
SODIUM SERPL-SCNC: 139 MMOL/L (ref 136–145)
TRIGL SERPL-MCNC: 81 MG/DL (ref 30–150)
TSH SERPL DL<=0.005 MIU/L-ACNC: 3.02 UIU/ML (ref 0.4–4)
WBC # BLD AUTO: 3.56 K/UL (ref 3.9–12.7)

## 2020-12-14 PROCEDURE — 84443 ASSAY THYROID STIM HORMONE: CPT

## 2020-12-14 PROCEDURE — 83036 HEMOGLOBIN GLYCOSYLATED A1C: CPT

## 2020-12-14 PROCEDURE — 36415 COLL VENOUS BLD VENIPUNCTURE: CPT

## 2020-12-14 PROCEDURE — 85025 COMPLETE CBC W/AUTO DIFF WBC: CPT

## 2020-12-14 PROCEDURE — 80053 COMPREHEN METABOLIC PANEL: CPT

## 2020-12-14 PROCEDURE — 80061 LIPID PANEL: CPT

## 2020-12-19 NOTE — PROGRESS NOTES
Subjective:      7:29 PM     Patient ID: Bailey Joseph is a 65 y.o. female.    Chief Complaint: Hyperlipidemia, prediabetes, and Shoulder Injury    Anemia  Presents for initial visit. Symptoms include bruises/bleeds easily. There has been no fever, light-headedness, palpitations or pica. Signs of blood loss that are not present include melena and vaginal bleeding. Past treatments include nothing. There is no history of hypothyroidism. (Low WBC, had bone marow biopsy in the past, nothing found. ) Procedure history includes bone marrow exam.     Has pre-diabetes which is stable. Does not take any medication for it. Weight has been gradually increasing in the past 2 years.     Continues to have pain in left shoulder and ROM is limited. She thought naproxen helped a lot a first, now it only takes the edge off of the pain. Depending on movement, left shoulder pain varies from 7-9/10.     Has insomnia, falls asleep easily, but wakes up multiple times a night. Snores and has daytime drowsiness, sometimes takes a nap she is so tired.   Review of Systems   Constitutional: Positive for fatigue. Negative for fever.   Cardiovascular: Negative for palpitations.   Gastrointestinal: Negative for melena.   Genitourinary: Negative for vaginal bleeding.   Musculoskeletal: Positive for arthralgias.   Neurological: Negative for light-headedness.   Hematological: Bruises/bleeds easily.   Psychiatric/Behavioral: Positive for sleep disturbance.         Objective:      Vitals:    12/21/20 0849   BP: 120/62   Pulse: 67   Resp: 18   Temp: 97.3 °F (36.3 °C)   TempSrc: Oral   SpO2: 98%   Weight: 94 kg (207 lb 3.7 oz)     Recent Results (from the past 1008 hour(s))   Fecal Immunochemical Test (iFOBT)    Collection Time: 12/09/20  1:31 PM   Result Value Ref Range    Fecal Immunochemical Test (iFOBT) Negative Negative   CBC Auto Differential    Collection Time: 12/14/20  7:42 AM   Result Value Ref Range    WBC 3.56 (L) 3.90 - 12.70 K/uL    RBC  4.00 4.00 - 5.40 M/uL    Hemoglobin 11.5 (L) 12.0 - 16.0 g/dL    Hematocrit 36.5 (L) 37.0 - 48.5 %    MCV 91 82 - 98 fL    MCH 28.8 27.0 - 31.0 pg    MCHC 31.5 (L) 32.0 - 36.0 g/dL    RDW 14.1 11.5 - 14.5 %    Platelets 265 150 - 350 K/uL    MPV 9.8 9.2 - 12.9 fL    Immature Granulocytes 0.0 0.0 - 0.5 %    Gran # (ANC) 0.7 (L) 1.8 - 7.7 K/uL    Immature Grans (Abs) 0.00 0.00 - 0.04 K/uL    Lymph # 2.0 1.0 - 4.8 K/uL    Mono # 0.5 0.3 - 1.0 K/uL    Eos # 0.3 0.0 - 0.5 K/uL    Baso # 0.05 0.00 - 0.20 K/uL    nRBC 0 0 /100 WBC    Gran % 20.8 (L) 38.0 - 73.0 %    Lymph % 57.0 (H) 18.0 - 48.0 %    Mono % 12.9 4.0 - 15.0 %    Eosinophil % 7.9 0.0 - 8.0 %    Basophil % 1.4 0.0 - 1.9 %    Differential Method Automated    TSH    Collection Time: 12/14/20  7:42 AM   Result Value Ref Range    TSH 3.019 0.400 - 4.000 uIU/mL   Lipid Panel    Collection Time: 12/14/20  7:42 AM   Result Value Ref Range    Cholesterol 172 120 - 199 mg/dL    Triglycerides 81 30 - 150 mg/dL    HDL 43 40 - 75 mg/dL    LDL Cholesterol 112.8 63.0 - 159.0 mg/dL    HDL/Cholesterol Ratio 25.0 20.0 - 50.0 %    Total Cholesterol/HDL Ratio 4.0 2.0 - 5.0    Non-HDL Cholesterol 129 mg/dL   Hemoglobin A1C    Collection Time: 12/14/20  7:42 AM   Result Value Ref Range    Hemoglobin A1C 6.0 (H) 4.0 - 5.6 %    Estimated Avg Glucose 126 68 - 131 mg/dL   Comprehensive Metabolic Panel    Collection Time: 12/14/20  7:42 AM   Result Value Ref Range    Sodium 139 136 - 145 mmol/L    Potassium 4.3 3.5 - 5.1 mmol/L    Chloride 104 95 - 110 mmol/L    CO2 26 23 - 29 mmol/L    Glucose 94 70 - 110 mg/dL    BUN 17 8 - 23 mg/dL    Creatinine 1.0 0.5 - 1.4 mg/dL    Calcium 9.0 8.7 - 10.5 mg/dL    Total Protein 7.9 6.0 - 8.4 g/dL    Albumin 3.8 3.5 - 5.2 g/dL    Total Bilirubin 0.3 0.1 - 1.0 mg/dL    Alkaline Phosphatase 72 55 - 135 U/L    AST 25 10 - 40 U/L    ALT 29 10 - 44 U/L    Anion Gap 9 8 - 16 mmol/L    eGFR if African American >60 >60 mL/min/1.73 m^2    eGFR if non  African American >60 >60 mL/min/1.73 m^2     Physical Exam  Vitals signs and nursing note reviewed.   Constitutional:       General: She is not in acute distress.     Appearance: Normal appearance. She is well-developed. She is not diaphoretic.   HENT:      Head: Normocephalic and atraumatic.   Eyes:      General: No scleral icterus.        Right eye: No discharge.         Left eye: No discharge.      Conjunctiva/sclera: Conjunctivae normal.   Cardiovascular:      Rate and Rhythm: Normal rate and regular rhythm.      Heart sounds: Normal heart sounds. No murmur. No friction rub. No gallop.    Pulmonary:      Effort: Pulmonary effort is normal. No respiratory distress.      Breath sounds: Normal breath sounds. No stridor. No wheezing, rhonchi or rales.   Musculoskeletal:         General: Tenderness present. No swelling, deformity or signs of injury.      Comments: Left arm positive neers, unable to abduct over head or ot put arm behind back.    Skin:     General: Skin is warm and dry.      Capillary Refill: Capillary refill takes less than 2 seconds.      Coloration: Skin is not jaundiced or pale.      Findings: No rash.   Neurological:      Mental Status: She is alert and oriented to person, place, and time.   Psychiatric:         Mood and Affect: Mood normal.         Behavior: Behavior normal.           Assessment:     This provider spent  25 minutes face to face with patient, more than half the time for counseling and coordination of care as noted.    1. Acute pain of left shoulder    2. Anemia, unspecified type    3. Acute left-sided low back pain without sciatica    4. Left shoulder strain, initial encounter    5. NSAID long-term use    6. Sleep disturbance    7. Pre-diabetes          Plan:       Acute pain of left shoulder  -     Ambulatory referral/consult to Orthopedics; Future; Expected date: 12/28/2020  -     naproxen (NAPROSYN) 500 MG tablet; Take 1 tablet (500 mg total) by mouth 2 (two) times daily with  meals.  Dispense: 60 tablet; Refill: 1    Anemia, unspecified type  -     Ferritin; Future; Expected date: 12/21/2020  -     Iron and TIBC; Future; Expected date: 12/21/2020  -     Vitamin B12; Future; Expected date: 12/21/2020    Acute left-sided low back pain without sciatica    Left shoulder strain, initial encounter  -     naproxen (NAPROSYN) 500 MG tablet; Take 1 tablet (500 mg total) by mouth 2 (two) times daily with meals.  Dispense: 60 tablet; Refill: 1    NSAID long-term use  -     omeprazole (PRILOSEC) 20 MG capsule; Take 1 capsule (20 mg total) by mouth once daily.  Dispense: 30 capsule; Refill: 1    Sleep disturbance  -     Ambulatory referral/consult to Sleep Disorders; Future; Expected date: 12/28/2020    Pre-diabetes  -     Hemoglobin A1C; Future; Expected date: 03/21/2021      3 months with labs prior  Follow dash diet, work on losing weight, 5 pounds in next 3 months. Has appt. To see Zandra Contreras in January for pap. Do not try to push left shoulder by reaching above your head, may use ice on shoulder and upper back 20 minutes on/ 20 minutes off.

## 2020-12-21 ENCOUNTER — OFFICE VISIT (OUTPATIENT)
Dept: FAMILY MEDICINE | Facility: CLINIC | Age: 65
End: 2020-12-21
Payer: COMMERCIAL

## 2020-12-21 VITALS
TEMPERATURE: 97 F | OXYGEN SATURATION: 98 % | BODY MASS INDEX: 31.51 KG/M2 | DIASTOLIC BLOOD PRESSURE: 62 MMHG | RESPIRATION RATE: 18 BRPM | SYSTOLIC BLOOD PRESSURE: 120 MMHG | HEART RATE: 67 BPM | WEIGHT: 207.25 LBS

## 2020-12-21 DIAGNOSIS — Z79.1 NSAID LONG-TERM USE: ICD-10-CM

## 2020-12-21 DIAGNOSIS — S46.912A LEFT SHOULDER STRAIN, INITIAL ENCOUNTER: ICD-10-CM

## 2020-12-21 DIAGNOSIS — R73.03 PRE-DIABETES: ICD-10-CM

## 2020-12-21 DIAGNOSIS — G47.9 SLEEP DISTURBANCE: ICD-10-CM

## 2020-12-21 DIAGNOSIS — M54.50 ACUTE LEFT-SIDED LOW BACK PAIN WITHOUT SCIATICA: ICD-10-CM

## 2020-12-21 DIAGNOSIS — M25.512 ACUTE PAIN OF LEFT SHOULDER: Primary | ICD-10-CM

## 2020-12-21 DIAGNOSIS — D64.9 ANEMIA, UNSPECIFIED TYPE: ICD-10-CM

## 2020-12-21 PROCEDURE — 99214 PR OFFICE/OUTPT VISIT, EST, LEVL IV, 30-39 MIN: ICD-10-PCS | Mod: S$GLB,,, | Performed by: NURSE PRACTITIONER

## 2020-12-21 PROCEDURE — 99214 OFFICE O/P EST MOD 30 MIN: CPT | Mod: S$GLB,,, | Performed by: NURSE PRACTITIONER

## 2020-12-21 RX ORDER — OMEPRAZOLE 20 MG/1
20 CAPSULE, DELAYED RELEASE ORAL DAILY
Qty: 30 CAPSULE | Refills: 1 | Status: SHIPPED | OUTPATIENT
Start: 2020-12-21 | End: 2021-04-26

## 2020-12-21 RX ORDER — NAPROXEN 500 MG/1
500 TABLET ORAL 2 TIMES DAILY WITH MEALS
Qty: 60 TABLET | Refills: 1 | Status: SHIPPED | OUTPATIENT
Start: 2020-12-21 | End: 2021-04-26

## 2020-12-21 NOTE — PATIENT INSTRUCTIONS
Eating Heart-Healthy Food: Using the DASH Plan    Eating for your heart doesnt have to be hard or boring. You just need to know how to make healthier choices. The DASH eating plan has been developed to help you do just that. DASH stands for Dietary Approaches to Stop Hypertension. It is a plan that has been proven to be healthier for your heart and to lower your risk for high blood pressure. It can also help lower your risk for cancer, heart disease, osteoporosis, and diabetes.  Choosing from each food group  Choose foods from each of the food groups below each day. Try to get the recommended number of servings for each food group. The serving numbers are based on a diet of 2,000 calories a day. Talk to your doctor if youre unsure about your calorie needs. Along with getting the correct servings, the DASH plan also recommends a sodium intake less than 2,300 mg per day.        Grains  Servings: 6 to 8 a day  A serving is:  · 1 slice bread  · 1 ounce dry cereal  · Half a cup cooked rice, pasta or cereal  Best choices: Whole grains and any grains high in fiber. Vegetables  Servings: 4 to 5 a day  A serving is:  · 1 cup raw leafy vegetable  · Half a cup cut-up raw or cooked vegetable  · Half a cup vegetable juice  Best choices: Fresh or frozen vegetables prepared without added salt or fat.   Fruits  Servings: 4 to 5 a day  A serving is:  · 1 medium fruit  · One-quarter cup dried fruit  · Half a cup fresh, frozen, or canned fruit  · Half a cup of 100% fruit juices  Best choices: A variety of fresh fruits of different colors. Whole fruits are a better choice than fruit juices. Low-fat or fat-free dairy  Servings: 2 to 3 a day  A serving is:  · 1 cup milk  · 1 cup yogurt  · One and a half ounces cheese  Best choices: Skim or 1% milk, low-fat or fat-free yogurt or buttermilk, and low-fat cheeses.         Lean meats, poultry, fish  Servings: 6 or fewer a day  A serving is:  · 1 ounce cooked meats, poultry, or fish  · 1  egg  Best choices: Lean poultry and fish. Trim away visible fat. Broil, grill, roast, or boil instead of frying. Remove skin from poultry before eating. Limit how much red meat you eat.  Nuts, seeds, beans  Servings: 4 to 5 a week  A serving is:  · One-third cup nuts (one and a half ounces)  · 2 tablespoons nut butter or seeds  · Half a cup cooked dry beans or legumes  Best choices: Dry roasted nuts with no salt added, lentils, kidney beans, garbanzo beans, and whole calzada beans.   Fats and oils  Servings: 2 to 3 a day  A serving is:  · 1 teaspoon vegetable oil  · 1 teaspoon soft margarine  · 1 tablespoon mayonnaise  · 2 tablespoons salad dressing  Best choices: Nut and vegetable oils (nontropical vegetable oils), such as olive and canola oil. Sweets  Servings: 5 a week or fewer  A serving is:  · 1 tablespoon sugar, maple syrup, or honey  · 1 tablespoon jam or jelly  · 1 half-ounce jelly beans (about 15)  · 1 cup lemonade  Best choices: Dried fruit can be a satisfying sweet. Choose low-fat sweets. And watch your serving sizes!      For more on the DASH eating plan, visit:  www.nhlbi.nih.gov/health/health-topics/topics/dash   Date Last Reviewed: 6/1/2016  © 3637-4108 [employer]. [address] [place] [address], [place] [address]. All rights reserved. This information is not intended as a substitute for professional medical care. Always follow your healthcare professional's instructions.      If you are asked to answer a survey from [facility], please do so and let them know how I did at your visit today.

## 2020-12-23 DIAGNOSIS — M25.512 LEFT SHOULDER PAIN, UNSPECIFIED CHRONICITY: Primary | ICD-10-CM

## 2020-12-28 ENCOUNTER — HOSPITAL ENCOUNTER (OUTPATIENT)
Dept: RADIOLOGY | Facility: HOSPITAL | Age: 65
Discharge: HOME | End: 2020-12-28
Attending: ORTHOPAEDIC SURGERY
Payer: COMMERCIAL

## 2020-12-28 ENCOUNTER — OFFICE VISIT (OUTPATIENT)
Dept: ORTHOPEDICS | Facility: CLINIC | Age: 65
End: 2020-12-28
Payer: COMMERCIAL

## 2020-12-28 VITALS — BODY MASS INDEX: 31.37 KG/M2 | HEIGHT: 68 IN | WEIGHT: 207 LBS

## 2020-12-28 DIAGNOSIS — M25.512 LEFT SHOULDER PAIN, UNSPECIFIED CHRONICITY: ICD-10-CM

## 2020-12-28 DIAGNOSIS — M25.512 ACUTE PAIN OF LEFT SHOULDER: ICD-10-CM

## 2020-12-28 PROCEDURE — 99203 OFFICE O/P NEW LOW 30 MIN: CPT | Mod: 25,S$GLB,, | Performed by: ORTHOPAEDIC SURGERY

## 2020-12-28 PROCEDURE — 73030 XR SHOULDER TRAUMA 3 VIEW LEFT: ICD-10-PCS | Mod: 26,LT,, | Performed by: RADIOLOGY

## 2020-12-28 PROCEDURE — 73030 X-RAY EXAM OF SHOULDER: CPT | Mod: 26,LT,, | Performed by: RADIOLOGY

## 2020-12-28 PROCEDURE — 99999 PR PBB SHADOW E&M-EST. PATIENT-LVL IV: ICD-10-PCS | Mod: PBBFAC,,, | Performed by: ORTHOPAEDIC SURGERY

## 2020-12-28 PROCEDURE — 99203 PR OFFICE/OUTPT VISIT, NEW, LEVL III, 30-44 MIN: ICD-10-PCS | Mod: 25,S$GLB,, | Performed by: ORTHOPAEDIC SURGERY

## 2020-12-28 PROCEDURE — 20610 LARGE JOINT ASPIRATION/INJECTION: L SUBACROMIAL BURSA: ICD-10-PCS | Mod: LT,S$GLB,, | Performed by: ORTHOPAEDIC SURGERY

## 2020-12-28 PROCEDURE — 99999 PR PBB SHADOW E&M-EST. PATIENT-LVL IV: CPT | Mod: PBBFAC,,, | Performed by: ORTHOPAEDIC SURGERY

## 2020-12-28 PROCEDURE — 20610 DRAIN/INJ JOINT/BURSA W/O US: CPT | Mod: LT,S$GLB,, | Performed by: ORTHOPAEDIC SURGERY

## 2020-12-28 PROCEDURE — 73030 X-RAY EXAM OF SHOULDER: CPT | Mod: TC,PN,LT

## 2020-12-28 RX ORDER — TRIAMCINOLONE ACETONIDE 40 MG/ML
40 INJECTION, SUSPENSION INTRA-ARTICULAR; INTRAMUSCULAR
Status: DISCONTINUED | OUTPATIENT
Start: 2020-12-28 | End: 2020-12-28 | Stop reason: HOSPADM

## 2020-12-28 RX ADMIN — TRIAMCINOLONE ACETONIDE 40 MG: 40 INJECTION, SUSPENSION INTRA-ARTICULAR; INTRAMUSCULAR at 08:12

## 2020-12-28 NOTE — LETTER
December 28, 2020      Oksana Gaitan, APRN  [address] [address]           [facility] SHERYL CHAN [address] [place] [id number]  Phone: [phone]          Patient: Bailey Joseph   MR Number: [id number]   YOB: 1955   Date of Visit: 12/28/2020       Dear Oksana Gaitan:    Thank you for referring Bailey Joseph to me for evaluation. Attached you will find relevant portions of my assessment and plan of care.    If you have questions, please do not hesitate to call me. I look forward to following Bailey Joseph along with you.    Sincerely,    Bruce Villavicencio MD    Enclosure  CC:  No Recipients    If you would like to receive this communication electronically, please contact [email or web address] or [phone] to request more information on [employer] access.    For providers and/or their staff who would like to refer a patient to [facility], please contact us through our one-stop-shop provider referral line, [facility], at [phone].    If you feel you have received this communication in error or would no longer like to receive these types of communications, please e-mail [email or web address]

## 2020-12-28 NOTE — PROCEDURES
Large Joint Aspiration/Injection: L subacromial bursa    Date/Time: 12/28/2020 8:45 AM  Performed by: Bruce Villavicencio MD  Authorized by: Bruce Villavicencio MD     Consent Done?:  Yes (Verbal)  Indications:  Pain  Timeout: prior to procedure the correct patient, procedure, and site was verified    Approach:  Lateral  Location:  Shoulder  Site:  L subacromial bursa  Medications:  40 mg triamcinolone acetonide 40 mg/mL

## 2020-12-28 NOTE — PROGRESS NOTES
History reviewed. No pertinent past medical history.    Past Surgical History:   Procedure Laterality Date    BREAST BIOPSY      LIPOMA RESECTION      TONSILLECTOMY         Current Outpatient Medications   Medication Sig    naproxen (NAPROSYN) 500 MG tablet Take 1 tablet (500 mg total) by mouth 2 (two) times daily with meals.    omeprazole (PRILOSEC) 20 MG capsule Take 1 capsule (20 mg total) by mouth once daily.     No current facility-administered medications for this visit.        Review of patient's allergies indicates:  No Known Allergies    History reviewed. No pertinent family history.    Social History     Socioeconomic History    Marital status: Single     Spouse name: Not on file    Number of children: Not on file    Years of education: Not on file    Highest education level: Not on file   Occupational History    Not on file   Social Needs    Financial resource strain: Not on file    Food insecurity     Worry: Not on file     Inability: Not on file    Transportation needs     Medical: Not on file     Non-medical: Not on file   Tobacco Use    Smoking status: Never Smoker    Smokeless tobacco: Never Used   Substance and Sexual Activity    Alcohol use: Yes     Frequency: Monthly or less     Drinks per session: 3 or 4     Binge frequency: Less than monthly    Drug use: No    Sexual activity: Yes   Lifestyle    Physical activity     Days per week: Not on file     Minutes per session: Not on file    Stress: Not on file   Relationships    Social connections     Talks on phone: Not on file     Gets together: Not on file     Attends Synagogue service: Not on file     Active member of club or organization: Not on file     Attends meetings of clubs or organizations: Not on file     Relationship status: Not on file   Other Topics Concern    Not on file   Social History Narrative    Not on file       Chief Complaint:   Chief Complaint   Patient presents with    Left Shoulder - Pain, Initial  "Visit       Consulting Physician: Oksana Gaitan APRN    History of present illness:    This is a 65 y.o. year old female who complains of right shoulder pain for several months.  She states that she woke up with pain in her shoulder.  She does not remember any injury.  She puts her pain today as 0/10 but states that it is worse with activities especially overhead.    Review of Systems:    Constitution:   Denies chills, fever, and sweats.  HENT:   Denies headaches or blurry vision.  Cardiovascular:  Denies chest pain or irregular heart beat.  Respiratory:   Denies cough or shortness of breath.  Gastrointestinal:  Denies abdominal pain, nausea, or vomiting.  Musculoskeletal:   Denies muscle cramps.  Neurological:   Denies dizziness or focal weakness.  Psychiatric/Behavior: Normal mental status.  Hematology/Lymph:  Denies bleeding problem or easy bruising/bleeding.  Skin:    Denies rash or suspicious lesions.    Examination:    Vital Signs:    Vitals:    12/28/20 0849   Weight: 93.9 kg (207 lb)   Height: 5' 8" (1.727 m)   PainSc: 0-No pain       Body mass index is 31.47 kg/m².    Constitution:   Well-developed, well nourished patient in no acute distress.  Neurological:   Alert and oriented x 3 and cooperative to examination.     Psychiatric/Behavior: Normal mental status.  Respiratory:   No shortness of breath.  Eyes:    Extraoccular muscles intact  Skin:    No scars, rash or suspicious lesions.    Physical Exam: Left Shoulder Exam     Skin  Rash:   None  Scars:   None    Inspection/Observation   Swelling:   none  Erythema:   none  Bruising:   none  Wounds:   none  Scapular Winging:  none  Scapular Dyskinesia:  none  Atrophy:   none  Masses:   None  Lymphadenopathy: None    Tenderness   AC Joint:   mild    Range of Motion   Active Forward Flexion:  160  Active External Rotation:  20  Active Internal Rotation:  Low back    Passive Forward Flexion:  170  Passive External Rotation:  30    Muscle Strength   Forward " Flexion:  4+/5  External Rotation:  4+/5  Internal Rotation:  4+/5    Tests & Signs   Cross Arm:   positive  Impingement:   positive    Other   Sensation:   normal  Pulse:    2+ radial          Imaging: X-rays ordered and images interpreted today personally by me of left shoulder appears normal.         Assessment: Acute pain of left shoulder  -     Ambulatory referral/consult to Orthopedics  -     Ambulatory referral/consult to Physical/Occupational Therapy; Future; Expected date: 01/04/2021  -     Large Joint Aspiration/Injection: L subacromial bursa        Plan:  Her history is consistent with calcific tendinitis but the x-rays do not demonstrate any deposits.  She does have some limited range of motion this could be an early frozen shoulder.  At this point will give her an injection today and [facility].  I would like to see her back in 6 weeks.      DISCLAIMER: This note may have been dictated using voice recognition software and may contain grammatical errors.     NOTE: Consult report sent to referring provider via [employer] EMR.

## 2020-12-30 ENCOUNTER — PATIENT OUTREACH (OUTPATIENT)
Dept: ADMINISTRATIVE | Facility: HOSPITAL | Age: 65
End: 2020-12-30

## 2021-03-24 ENCOUNTER — LAB VISIT (OUTPATIENT)
Dept: LAB | Facility: HOSPITAL | Age: 66
End: 2021-03-24
Attending: NURSE PRACTITIONER
Payer: COMMERCIAL

## 2021-03-24 DIAGNOSIS — D64.9 ANEMIA, UNSPECIFIED TYPE: ICD-10-CM

## 2021-03-24 DIAGNOSIS — R73.03 PRE-DIABETES: ICD-10-CM

## 2021-03-24 LAB
ESTIMATED AVG GLUCOSE: 120 MG/DL (ref 68–131)
FERRITIN SERPL-MCNC: 46 NG/ML (ref 20–300)
HBA1C MFR BLD: 5.8 % (ref 4–5.6)
IRON SERPL-MCNC: 56 UG/DL (ref 30–160)
SATURATED IRON: 14 % (ref 20–50)
TOTAL IRON BINDING CAPACITY: 407 UG/DL (ref 250–450)
TRANSFERRIN SERPL-MCNC: 275 MG/DL (ref 200–375)
VIT B12 SERPL-MCNC: 605 PG/ML (ref 210–950)

## 2021-03-24 PROCEDURE — 82728 ASSAY OF FERRITIN: CPT | Performed by: NURSE PRACTITIONER

## 2021-03-24 PROCEDURE — 36415 COLL VENOUS BLD VENIPUNCTURE: CPT | Performed by: NURSE PRACTITIONER

## 2021-03-24 PROCEDURE — 83540 ASSAY OF IRON: CPT | Performed by: NURSE PRACTITIONER

## 2021-03-24 PROCEDURE — 83036 HEMOGLOBIN GLYCOSYLATED A1C: CPT | Performed by: NURSE PRACTITIONER

## 2021-03-24 PROCEDURE — 82607 VITAMIN B-12: CPT | Performed by: NURSE PRACTITIONER

## 2021-03-31 ENCOUNTER — IMMUNIZATION (OUTPATIENT)
Dept: PRIMARY CARE CLINIC | Facility: CLINIC | Age: 66
End: 2021-03-31

## 2021-03-31 DIAGNOSIS — Z23 NEED FOR VACCINATION: Primary | ICD-10-CM

## 2021-03-31 PROCEDURE — 0031A PR IMMUNIZ ADMIN, SARS-COV-2 COVID-19 VACC, 5X10VP/0.5ML: CPT | Mod: CV19,S$GLB,, | Performed by: INTERNAL MEDICINE

## 2021-03-31 PROCEDURE — 91303 PR SARSCOV2 VAC AD26 .5ML IM: CPT | Mod: S$GLB,,, | Performed by: INTERNAL MEDICINE

## 2021-03-31 PROCEDURE — 91303 PR SARSCOV2 VAC AD26 .5ML IM: ICD-10-PCS | Mod: S$GLB,,, | Performed by: INTERNAL MEDICINE

## 2021-03-31 PROCEDURE — 0031A PR IMMUNIZ ADMIN, SARS-COV-2 COVID-19 VACC, 5X10VP/0.5ML: ICD-10-PCS | Mod: CV19,S$GLB,, | Performed by: INTERNAL MEDICINE

## 2021-05-03 ENCOUNTER — OFFICE VISIT (OUTPATIENT)
Dept: FAMILY MEDICINE | Facility: CLINIC | Age: 66
End: 2021-05-03
Payer: COMMERCIAL

## 2021-05-03 VITALS
HEIGHT: 68 IN | SYSTOLIC BLOOD PRESSURE: 126 MMHG | RESPIRATION RATE: 18 BRPM | HEART RATE: 67 BPM | OXYGEN SATURATION: 97 % | BODY MASS INDEX: 31.71 KG/M2 | DIASTOLIC BLOOD PRESSURE: 78 MMHG | TEMPERATURE: 97 F | WEIGHT: 209.19 LBS

## 2021-05-03 DIAGNOSIS — E61.1 IRON DEFICIENCY: Primary | ICD-10-CM

## 2021-05-03 DIAGNOSIS — R20.2 PARESTHESIAS: ICD-10-CM

## 2021-05-03 DIAGNOSIS — Z12.31 BREAST CANCER SCREENING BY MAMMOGRAM: ICD-10-CM

## 2021-05-03 DIAGNOSIS — R73.03 PRE-DIABETES: ICD-10-CM

## 2021-05-03 PROCEDURE — 99213 OFFICE O/P EST LOW 20 MIN: CPT | Mod: S$GLB,,, | Performed by: NURSE PRACTITIONER

## 2021-05-03 PROCEDURE — 99213 PR OFFICE/OUTPT VISIT, EST, LEVL III, 20-29 MIN: ICD-10-PCS | Mod: S$GLB,,, | Performed by: NURSE PRACTITIONER

## 2021-05-04 PROBLEM — R20.2 PARESTHESIAS: Status: ACTIVE | Noted: 2021-05-04

## 2021-05-04 PROBLEM — E61.1 IRON DEFICIENCY: Status: ACTIVE | Noted: 2021-05-04

## 2021-05-04 PROBLEM — R73.03 PRE-DIABETES: Status: ACTIVE | Noted: 2021-05-04

## 2021-08-12 ENCOUNTER — HOSPITAL ENCOUNTER (OUTPATIENT)
Dept: RADIOLOGY | Facility: CLINIC | Age: 66
Discharge: HOME | End: 2021-08-12
Attending: NURSE PRACTITIONER
Payer: COMMERCIAL

## 2021-08-12 DIAGNOSIS — Z12.31 BREAST CANCER SCREENING BY MAMMOGRAM: ICD-10-CM

## 2021-08-12 PROCEDURE — 77067 MAMMO DIGITAL SCREENING BILAT WITH TOMO: ICD-10-PCS | Mod: 26,,, | Performed by: RADIOLOGY

## 2021-08-12 PROCEDURE — 77063 BREAST TOMOSYNTHESIS BI: CPT | Mod: 26,,, | Performed by: RADIOLOGY

## 2021-08-12 PROCEDURE — 77067 SCR MAMMO BI INCL CAD: CPT | Mod: TC,PO

## 2021-08-12 PROCEDURE — 77063 MAMMO DIGITAL SCREENING BILAT WITH TOMO: ICD-10-PCS | Mod: 26,,, | Performed by: RADIOLOGY

## 2021-08-12 PROCEDURE — 77067 SCR MAMMO BI INCL CAD: CPT | Mod: 26,,, | Performed by: RADIOLOGY

## 2021-11-09 ENCOUNTER — LAB VISIT (OUTPATIENT)
Dept: LAB | Facility: HOSPITAL | Age: 66
End: 2021-11-09
Attending: NURSE PRACTITIONER
Payer: COMMERCIAL

## 2021-11-09 DIAGNOSIS — E61.1 IRON DEFICIENCY: ICD-10-CM

## 2021-11-09 DIAGNOSIS — R73.03 PRE-DIABETES: ICD-10-CM

## 2021-11-09 LAB
BASOPHILS # BLD AUTO: 0.03 K/UL (ref 0–0.2)
BASOPHILS NFR BLD: 0.8 % (ref 0–1.9)
DIFFERENTIAL METHOD: ABNORMAL
EOSINOPHIL # BLD AUTO: 0.2 K/UL (ref 0–0.5)
EOSINOPHIL NFR BLD: 6 % (ref 0–8)
ERYTHROCYTE [DISTWIDTH] IN BLOOD BY AUTOMATED COUNT: 14.1 % (ref 11.5–14.5)
ESTIMATED AVG GLUCOSE: 126 MG/DL (ref 68–131)
HBA1C MFR BLD: 6 % (ref 4–5.6)
HCT VFR BLD AUTO: 37.8 % (ref 37–48.5)
HGB BLD-MCNC: 12 G/DL (ref 12–16)
IMM GRANULOCYTES # BLD AUTO: 0.01 K/UL (ref 0–0.04)
IMM GRANULOCYTES NFR BLD AUTO: 0.3 % (ref 0–0.5)
IRON SERPL-MCNC: 71 UG/DL (ref 30–160)
LYMPHOCYTES # BLD AUTO: 2 K/UL (ref 1–4.8)
LYMPHOCYTES NFR BLD: 52.5 % (ref 18–48)
MCH RBC QN AUTO: 29.3 PG (ref 27–31)
MCHC RBC AUTO-ENTMCNC: 31.7 G/DL (ref 32–36)
MCV RBC AUTO: 92 FL (ref 82–98)
MONOCYTES # BLD AUTO: 0.4 K/UL (ref 0.3–1)
MONOCYTES NFR BLD: 10.4 % (ref 4–15)
NEUTROPHILS # BLD AUTO: 1.2 K/UL (ref 1.8–7.7)
NEUTROPHILS NFR BLD: 30 % (ref 38–73)
NRBC BLD-RTO: 0 /100 WBC
PLATELET # BLD AUTO: 274 K/UL (ref 150–450)
PMV BLD AUTO: 9.7 FL (ref 9.2–12.9)
RBC # BLD AUTO: 4.09 M/UL (ref 4–5.4)
SATURATED IRON: 17 % (ref 20–50)
TOTAL IRON BINDING CAPACITY: 411 UG/DL (ref 250–450)
TRANSFERRIN SERPL-MCNC: 278 MG/DL (ref 200–375)
WBC # BLD AUTO: 3.83 K/UL (ref 3.9–12.7)

## 2021-11-09 PROCEDURE — 83036 HEMOGLOBIN GLYCOSYLATED A1C: CPT | Performed by: NURSE PRACTITIONER

## 2021-11-09 PROCEDURE — 85025 COMPLETE CBC W/AUTO DIFF WBC: CPT | Performed by: NURSE PRACTITIONER

## 2021-11-09 PROCEDURE — 36415 COLL VENOUS BLD VENIPUNCTURE: CPT | Performed by: NURSE PRACTITIONER

## 2021-11-09 PROCEDURE — 84466 ASSAY OF TRANSFERRIN: CPT | Performed by: NURSE PRACTITIONER

## 2022-01-10 ENCOUNTER — LAB VISIT (OUTPATIENT)
Dept: PRIMARY CARE CLINIC | Facility: CLINIC | Age: 67
End: 2022-01-10
Payer: COMMERCIAL

## 2022-01-10 DIAGNOSIS — Z20.822 CONTACT WITH AND (SUSPECTED) EXPOSURE TO COVID-19: ICD-10-CM

## 2022-01-10 LAB
CTP QC/QA: YES
SARS-COV-2 AG RESP QL IA.RAPID: NEGATIVE

## 2022-01-10 PROCEDURE — 87811 SARS-COV-2 COVID19 W/OPTIC: CPT

## 2022-05-11 ENCOUNTER — OFFICE VISIT (OUTPATIENT)
Dept: FAMILY MEDICINE | Facility: CLINIC | Age: 67
End: 2022-05-11
Payer: MEDICAID

## 2022-05-11 VITALS
HEART RATE: 96 BPM | WEIGHT: 207 LBS | DIASTOLIC BLOOD PRESSURE: 62 MMHG | SYSTOLIC BLOOD PRESSURE: 120 MMHG | BODY MASS INDEX: 31.37 KG/M2 | OXYGEN SATURATION: 97 % | HEIGHT: 68 IN

## 2022-05-11 DIAGNOSIS — Z12.11 COLON CANCER SCREENING: ICD-10-CM

## 2022-05-11 DIAGNOSIS — G89.29 CHRONIC PAIN OF BOTH FEET: ICD-10-CM

## 2022-05-11 DIAGNOSIS — R73.03 PRE-DIABETES: ICD-10-CM

## 2022-05-11 DIAGNOSIS — E61.1 IRON DEFICIENCY: Primary | ICD-10-CM

## 2022-05-11 DIAGNOSIS — M79.605 LEFT LEG PAIN: ICD-10-CM

## 2022-05-11 DIAGNOSIS — E66.9 OBESITY (BMI 30.0-34.9): ICD-10-CM

## 2022-05-11 DIAGNOSIS — M79.671 CHRONIC PAIN OF BOTH FEET: ICD-10-CM

## 2022-05-11 DIAGNOSIS — G47.10 DAYTIME HYPERSOMNIA: ICD-10-CM

## 2022-05-11 DIAGNOSIS — F41.9 ANXIETY: ICD-10-CM

## 2022-05-11 DIAGNOSIS — M79.672 CHRONIC PAIN OF BOTH FEET: ICD-10-CM

## 2022-05-11 DIAGNOSIS — R06.83 HABITUAL SNORING: ICD-10-CM

## 2022-05-11 DIAGNOSIS — G47.9 SLEEP DISTURBANCE: ICD-10-CM

## 2022-05-11 PROCEDURE — 1160F PR REVIEW ALL MEDS BY PRESCRIBER/CLIN PHARMACIST DOCUMENTED: ICD-10-PCS | Mod: CPTII,,, | Performed by: NURSE PRACTITIONER

## 2022-05-11 PROCEDURE — 99999 PR PBB SHADOW E&M-EST. PATIENT-LVL IV: ICD-10-PCS | Mod: PBBFAC,,, | Performed by: NURSE PRACTITIONER

## 2022-05-11 PROCEDURE — 3078F DIAST BP <80 MM HG: CPT | Mod: CPTII,,, | Performed by: NURSE PRACTITIONER

## 2022-05-11 PROCEDURE — 1159F PR MEDICATION LIST DOCUMENTED IN MEDICAL RECORD: ICD-10-PCS | Mod: CPTII,,, | Performed by: NURSE PRACTITIONER

## 2022-05-11 PROCEDURE — 1160F RVW MEDS BY RX/DR IN RCRD: CPT | Mod: CPTII,,, | Performed by: NURSE PRACTITIONER

## 2022-05-11 PROCEDURE — 99214 OFFICE O/P EST MOD 30 MIN: CPT | Mod: PBBFAC,PO | Performed by: NURSE PRACTITIONER

## 2022-05-11 PROCEDURE — 99999 PR PBB SHADOW E&M-EST. PATIENT-LVL IV: CPT | Mod: PBBFAC,,, | Performed by: NURSE PRACTITIONER

## 2022-05-11 PROCEDURE — 99214 OFFICE O/P EST MOD 30 MIN: CPT | Mod: S$PBB,,, | Performed by: NURSE PRACTITIONER

## 2022-05-11 PROCEDURE — 99214 PR OFFICE/OUTPT VISIT, EST, LEVL IV, 30-39 MIN: ICD-10-PCS | Mod: S$PBB,,, | Performed by: NURSE PRACTITIONER

## 2022-05-11 PROCEDURE — 1159F MED LIST DOCD IN RCRD: CPT | Mod: CPTII,,, | Performed by: NURSE PRACTITIONER

## 2022-05-11 PROCEDURE — 3078F PR MOST RECENT DIASTOLIC BLOOD PRESSURE < 80 MM HG: ICD-10-PCS | Mod: CPTII,,, | Performed by: NURSE PRACTITIONER

## 2022-05-11 PROCEDURE — 3008F PR BODY MASS INDEX (BMI) DOCUMENTED: ICD-10-PCS | Mod: CPTII,,, | Performed by: NURSE PRACTITIONER

## 2022-05-11 PROCEDURE — 3008F BODY MASS INDEX DOCD: CPT | Mod: CPTII,,, | Performed by: NURSE PRACTITIONER

## 2022-05-11 PROCEDURE — 3074F SYST BP LT 130 MM HG: CPT | Mod: CPTII,,, | Performed by: NURSE PRACTITIONER

## 2022-05-11 PROCEDURE — 3074F PR MOST RECENT SYSTOLIC BLOOD PRESSURE < 130 MM HG: ICD-10-PCS | Mod: CPTII,,, | Performed by: NURSE PRACTITIONER

## 2022-05-11 RX ORDER — HYDROXYZINE HYDROCHLORIDE 25 MG/1
25 TABLET, FILM COATED ORAL 4 TIMES DAILY PRN
Qty: 30 TABLET | Refills: 2 | Status: SHIPPED | OUTPATIENT
Start: 2022-05-11 | End: 2022-06-10

## 2022-05-11 NOTE — PROGRESS NOTES
"Subjective:       Patient ID: Bailey Joseph is a 66 y.o. female.    Chief Complaint: Follow-up    HPI      Patient presents today for chronic conditions follow up. She is new to me. Patient is due for labs and a colonoscopy. Has multiple complaints at today's visit.  5/3/2021 Oksana Long- anemia and leg pain  Past Medical History:   Diagnosis Date    Diabetes mellitus, type 2     Thyroid disease        Review of patient's allergies indicates:  No Known Allergies      Current Outpatient Medications:     ferrous sulfate, dried (SLOW FE) 160 mg (50 mg iron) TbSR, Take 160 mg by mouth once daily., Disp: , Rfl:     hydrOXYzine HCL (ATARAX) 25 MG tablet, Take 1 tablet (25 mg total) by mouth 4 (four) times daily as needed for Anxiety., Disp: 30 tablet, Rfl: 2    Review of Systems   Constitutional: Negative for unexpected weight change.   HENT: Negative for trouble swallowing.    Eyes: Negative for visual disturbance.   Respiratory: Negative for shortness of breath.    Cardiovascular: Negative for palpitations and leg swelling.   Gastrointestinal: Negative for blood in stool.   Genitourinary: Negative for hematuria.   Allergic/Immunologic: Negative for immunocompromised state.   Hematological: Does not bruise/bleed easily.   Psychiatric/Behavioral: Negative for agitation. The patient is not nervous/anxious.        Objective:      /62 (BP Location: Right arm, Patient Position: Sitting, BP Method: Small (Manual))   Pulse 96   Ht 5' 8" (1.727 m)   Wt 93.9 kg (207 lb 0.2 oz)   SpO2 97%   BMI 31.48 kg/m²   Physical Exam  Constitutional:       Appearance: She is well-developed.   Eyes:      Pupils: Pupils are equal, round, and reactive to light.   Cardiovascular:      Rate and Rhythm: Normal rate and regular rhythm.      Heart sounds: Normal heart sounds.   Pulmonary:      Effort: Pulmonary effort is normal.      Breath sounds: Normal breath sounds.   Abdominal:      General: Bowel sounds are normal.      " Palpations: Abdomen is soft.   Musculoskeletal:         General: Normal range of motion.      Cervical back: Normal range of motion.   Skin:     General: Skin is warm and dry.   Neurological:      Mental Status: She is alert and oriented to person, place, and time.   Psychiatric:         Behavior: Behavior normal.         Thought Content: Thought content normal.         Judgment: Judgment normal.         Assessment:       1. Iron deficiency    2. Pre-diabetes    3. Chronic pain of both feet    4. Colon cancer screening    5. Sleep disturbance    6. Habitual snoring    7. Daytime hypersomnia    8. Left leg pain    9. Anxiety    10. Obesity (BMI 30.0-34.9)        Plan:       Iron deficiency  -     Comprehensive Metabolic Panel; Future; Expected date: 05/11/2022  -     Iron and TIBC; Future; Expected date: 05/11/2022  -     FERRITIN; Future; Expected date: 05/11/2022    Pre-diabetes  -     CBC W/ AUTO DIFFERENTIAL; Future; Expected date: 05/11/2022  -     Lipid Panel; Future; Expected date: 05/11/2022  -     Hemoglobin A1C; Future; Expected date: 05/11/2022  -     TSH; Future; Expected date: 05/11/2022  Stable, continue management  BP Readings from Last 3 Encounters:   05/11/22 120/62   05/03/21 126/78   12/21/20 120/62     Chronic pain of both feet  -     X-Ray Foot AP Bilateral; Future; Expected date: 05/11/2022    Colon cancer screening  -     Case Request Endoscopy: COLONOSCOPY    Sleep disturbance  -     Polysomnogram (CPAP will be added if patient meets diagnostic criteria.); Future  -     Ambulatory referral/consult to Pulmonology; Future; Expected date: 05/18/2022    Habitual snoring  -     Polysomnogram (CPAP will be added if patient meets diagnostic criteria.); Future  -     Ambulatory referral/consult to Pulmonology; Future; Expected date: 05/18/2022    Daytime hypersomnia  -     Polysomnogram (CPAP will be added if patient meets diagnostic criteria.); Future  -     Ambulatory referral/consult to  "Pulmonology; Future; Expected date: 05/18/2022    Left leg pain  -     US Lower Extremity Veins Left; Future; Expected date: 05/11/2022    Anxiety  -     hydrOXYzine HCL (ATARAX) 25 MG tablet; Take 1 tablet (25 mg total) by mouth 4 (four) times daily as needed for Anxiety.  Dispense: 30 tablet; Refill: 2    Obesity (BMI 30.0-34.9)  Counseled patient on his ideal body weight, health consequences of being obese and current recommendations including weekly exercise and a heart healthy diet.  Current BMI is:Estimated body mass index is 31.48 kg/m² as calculated from the following:    Height as of this encounter: 5' 8" (1.727 m).    Weight as of this encounter: 93.9 kg (207 lb 0.2 oz)..  Patient is aware that ideal BMI < 25 or Weight in (lb) to have BMI = 25: 164.1.            Patient readiness: acceptance and barriers:none    During the course of the visit the patient was educated and counseled about the following:     Obesity:   General weight loss/lifestyle modification strategies discussed (elicit support from others; identify saboteurs; non-food rewards, etc).  Regular aerobic exercise program discussed.    Goals: Obesity: Reduce calorie intake and BMI    Did patient meet goals/outcomes: No    The following self management tools provided: declined    Patient Instructions (the written plan) was given to the patient/family.     Time spent with patient: 15 minutes    Barriers to medications present (no )    Adverse reactions to current medications (no)    Over the counter medications reviewed (Yes)          "

## 2022-05-12 ENCOUNTER — LAB VISIT (OUTPATIENT)
Dept: LAB | Facility: HOSPITAL | Age: 67
End: 2022-05-12
Attending: NURSE PRACTITIONER
Payer: MEDICAID

## 2022-05-12 DIAGNOSIS — R73.03 PRE-DIABETES: ICD-10-CM

## 2022-05-12 DIAGNOSIS — E61.1 IRON DEFICIENCY: ICD-10-CM

## 2022-05-12 LAB
ALBUMIN SERPL BCP-MCNC: 3.7 G/DL (ref 3.5–5.2)
ALP SERPL-CCNC: 57 U/L (ref 55–135)
ALT SERPL W/O P-5'-P-CCNC: 21 U/L (ref 10–44)
ANION GAP SERPL CALC-SCNC: 9 MMOL/L (ref 8–16)
AST SERPL-CCNC: 22 U/L (ref 10–40)
BASOPHILS # BLD AUTO: 0.04 K/UL (ref 0–0.2)
BASOPHILS NFR BLD: 1.1 % (ref 0–1.9)
BILIRUB SERPL-MCNC: 0.2 MG/DL (ref 0.1–1)
BUN SERPL-MCNC: 15 MG/DL (ref 8–23)
CALCIUM SERPL-MCNC: 9.1 MG/DL (ref 8.7–10.5)
CHLORIDE SERPL-SCNC: 105 MMOL/L (ref 95–110)
CHOLEST SERPL-MCNC: 182 MG/DL (ref 120–199)
CHOLEST/HDLC SERPL: 4.3 {RATIO} (ref 2–5)
CO2 SERPL-SCNC: 24 MMOL/L (ref 23–29)
CREAT SERPL-MCNC: 0.9 MG/DL (ref 0.5–1.4)
DIFFERENTIAL METHOD: ABNORMAL
EOSINOPHIL # BLD AUTO: 0.2 K/UL (ref 0–0.5)
EOSINOPHIL NFR BLD: 5 % (ref 0–8)
ERYTHROCYTE [DISTWIDTH] IN BLOOD BY AUTOMATED COUNT: 14.1 % (ref 11.5–14.5)
EST. GFR  (AFRICAN AMERICAN): >60 ML/MIN/1.73 M^2
EST. GFR  (NON AFRICAN AMERICAN): >60 ML/MIN/1.73 M^2
ESTIMATED AVG GLUCOSE: 126 MG/DL (ref 68–131)
FERRITIN SERPL-MCNC: 63 NG/ML (ref 20–300)
GLUCOSE SERPL-MCNC: 95 MG/DL (ref 70–110)
HBA1C MFR BLD: 6 % (ref 4–5.6)
HCT VFR BLD AUTO: 36.3 % (ref 37–48.5)
HDLC SERPL-MCNC: 42 MG/DL (ref 40–75)
HDLC SERPL: 23.1 % (ref 20–50)
HGB BLD-MCNC: 11.9 G/DL (ref 12–16)
IMM GRANULOCYTES # BLD AUTO: 0.01 K/UL (ref 0–0.04)
IMM GRANULOCYTES NFR BLD AUTO: 0.3 % (ref 0–0.5)
IRON SERPL-MCNC: 53 UG/DL (ref 30–160)
LDLC SERPL CALC-MCNC: 124.6 MG/DL (ref 63–159)
LYMPHOCYTES # BLD AUTO: 2.1 K/UL (ref 1–4.8)
LYMPHOCYTES NFR BLD: 56.8 % (ref 18–48)
MCH RBC QN AUTO: 29.1 PG (ref 27–31)
MCHC RBC AUTO-ENTMCNC: 32.8 G/DL (ref 32–36)
MCV RBC AUTO: 89 FL (ref 82–98)
MONOCYTES # BLD AUTO: 0.4 K/UL (ref 0.3–1)
MONOCYTES NFR BLD: 10.5 % (ref 4–15)
NEUTROPHILS # BLD AUTO: 1 K/UL (ref 1.8–7.7)
NEUTROPHILS NFR BLD: 26.3 % (ref 38–73)
NONHDLC SERPL-MCNC: 140 MG/DL
NRBC BLD-RTO: 0 /100 WBC
PLATELET # BLD AUTO: 277 K/UL (ref 150–450)
PMV BLD AUTO: 10.4 FL (ref 9.2–12.9)
POTASSIUM SERPL-SCNC: 4.1 MMOL/L (ref 3.5–5.1)
PROT SERPL-MCNC: 7.9 G/DL (ref 6–8.4)
RBC # BLD AUTO: 4.09 M/UL (ref 4–5.4)
SATURATED IRON: 13 % (ref 20–50)
SODIUM SERPL-SCNC: 138 MMOL/L (ref 136–145)
TOTAL IRON BINDING CAPACITY: 410 UG/DL (ref 250–450)
TRANSFERRIN SERPL-MCNC: 277 MG/DL (ref 200–375)
TRIGL SERPL-MCNC: 77 MG/DL (ref 30–150)
TSH SERPL DL<=0.005 MIU/L-ACNC: 3.39 UIU/ML (ref 0.4–4)
WBC # BLD AUTO: 3.61 K/UL (ref 3.9–12.7)

## 2022-05-12 PROCEDURE — 84443 ASSAY THYROID STIM HORMONE: CPT | Performed by: NURSE PRACTITIONER

## 2022-05-12 PROCEDURE — 80053 COMPREHEN METABOLIC PANEL: CPT | Performed by: NURSE PRACTITIONER

## 2022-05-12 PROCEDURE — 80061 LIPID PANEL: CPT | Performed by: NURSE PRACTITIONER

## 2022-05-12 PROCEDURE — 85025 COMPLETE CBC W/AUTO DIFF WBC: CPT | Performed by: NURSE PRACTITIONER

## 2022-05-12 PROCEDURE — 83036 HEMOGLOBIN GLYCOSYLATED A1C: CPT | Performed by: NURSE PRACTITIONER

## 2022-05-12 PROCEDURE — 84466 ASSAY OF TRANSFERRIN: CPT | Performed by: NURSE PRACTITIONER

## 2022-05-12 PROCEDURE — 36415 COLL VENOUS BLD VENIPUNCTURE: CPT | Mod: PO | Performed by: NURSE PRACTITIONER

## 2022-05-12 PROCEDURE — 82728 ASSAY OF FERRITIN: CPT | Performed by: NURSE PRACTITIONER

## 2022-05-13 NOTE — PROGRESS NOTES
Informed patient about her labs and starting a low carb diet, Patient all understanding. Patient stated that she had some more labs that she had some concerns about if you could explain further on white blood count an red blood count. She stated that her numbers were off.

## 2022-05-16 ENCOUNTER — TELEPHONE (OUTPATIENT)
Dept: FAMILY MEDICINE | Facility: CLINIC | Age: 67
End: 2022-05-16

## 2022-05-19 ENCOUNTER — PATIENT MESSAGE (OUTPATIENT)
Dept: GASTROENTEROLOGY | Facility: CLINIC | Age: 67
End: 2022-05-19
Payer: MEDICAID

## 2022-05-23 ENCOUNTER — HOSPITAL ENCOUNTER (OUTPATIENT)
Dept: RADIOLOGY | Facility: CLINIC | Age: 67
Discharge: HOME | End: 2022-05-23
Attending: NURSE PRACTITIONER
Payer: MEDICAID

## 2022-05-23 DIAGNOSIS — M79.672 CHRONIC PAIN OF BOTH FEET: ICD-10-CM

## 2022-05-23 DIAGNOSIS — G89.29 CHRONIC PAIN OF BOTH FEET: ICD-10-CM

## 2022-05-23 DIAGNOSIS — M79.671 CHRONIC PAIN OF BOTH FEET: ICD-10-CM

## 2022-05-23 PROCEDURE — 73630 X-RAY EXAM OF FOOT: CPT | Mod: TC,50,FY,PO

## 2022-05-23 PROCEDURE — 73630 X-RAY EXAM OF FOOT: CPT | Mod: 26,50,S$GLB, | Performed by: RADIOLOGY

## 2022-05-23 PROCEDURE — 73630 XR FOOT COMPLETE 3 VIEW BILATERAL: ICD-10-PCS | Mod: 26,50,S$GLB, | Performed by: RADIOLOGY

## 2022-05-30 DIAGNOSIS — G89.29 CHRONIC PAIN OF BOTH FEET: Primary | ICD-10-CM

## 2022-05-30 DIAGNOSIS — M79.672 CHRONIC PAIN OF BOTH FEET: Primary | ICD-10-CM

## 2022-05-30 DIAGNOSIS — M79.671 CHRONIC PAIN OF BOTH FEET: Primary | ICD-10-CM

## 2022-07-08 ENCOUNTER — OFFICE VISIT (OUTPATIENT)
Dept: PODIATRY | Facility: CLINIC | Age: 67
End: 2022-07-08
Payer: MEDICAID

## 2022-07-08 VITALS — WEIGHT: 207 LBS | HEIGHT: 68 IN | BODY MASS INDEX: 31.37 KG/M2

## 2022-07-08 DIAGNOSIS — M79.672 CHRONIC PAIN OF BOTH FEET: ICD-10-CM

## 2022-07-08 DIAGNOSIS — G89.29 CHRONIC PAIN OF BOTH FEET: ICD-10-CM

## 2022-07-08 DIAGNOSIS — M79.671 PAIN OF RIGHT HEEL: ICD-10-CM

## 2022-07-08 DIAGNOSIS — M79.671 PAIN IN BOTH FEET: ICD-10-CM

## 2022-07-08 DIAGNOSIS — M72.2 PLANTAR FASCIITIS: Primary | ICD-10-CM

## 2022-07-08 DIAGNOSIS — M79.671 CHRONIC PAIN OF BOTH FEET: ICD-10-CM

## 2022-07-08 DIAGNOSIS — M79.672 PAIN IN BOTH FEET: ICD-10-CM

## 2022-07-08 PROCEDURE — 99203 PR OFFICE/OUTPT VISIT, NEW, LEVL III, 30-44 MIN: ICD-10-PCS | Mod: S$GLB,,, | Performed by: PODIATRIST

## 2022-07-08 PROCEDURE — 99203 OFFICE O/P NEW LOW 30 MIN: CPT | Mod: S$GLB,,, | Performed by: PODIATRIST

## 2022-07-08 NOTE — PROGRESS NOTES
"  [address] [facility] [address]  [place] [address]  Phone: [phone]   [phone]    Patient's PCP:Primary Doctor No  Referring Provider: Nilesh Joseph    Subjective:      Chief Complaint:: Foot Pain (Bilateral plantar feet) and Heel Pain (Right)    JOHN Joseph is a 67 y.o. female who presents today with a complaint of bilateral foot pain (whole plantar foot pain), however, mainly right heel is painful lasting for a couple of months. Onset of symptoms unknown and reports no trauma.  Patient states in the middle of the night when getting out of bed to use bathroom, bilateral foot pain causes patient to limp to walk to another room, her right heel feels like a steel bar pushing in it and sometimes she gets random shooting pains in the heel.  Aggravating factors are being off of feet for a prolong period of time then getting back on them, certain shoes without much support. Treatment to date have included different shoes, seeing primary on 05/11/2022 who took xrays and referred patient to our office, tried rolling her foot/heel on a frozen water bottle 2x which caused more pain.      *patient is a pre-diabetic*  Systemic Doctor: Nilesh Joseph NP  Date Last Seen: 05/11/2022  Blood Sugar: does not check  Hemoglobin A1c: 6.0 (05/12/2022)    Vitals:    07/08/22 1124   Weight: 93.9 kg (207 lb)   Height: 5' 8" (1.727 m)   PainSc:   8      Shoe Size: 9    Past Surgical History:   Procedure Laterality Date    BREAST BIOPSY      LIPOMA RESECTION      TONSILLECTOMY       Past Medical History:   Diagnosis Date    Diabetes mellitus, type 2     Thyroid disease      Family History   Problem Relation Age of Onset    Diabetes Mother     Diabetes Sister     Diabetes Brother         Social History:   Marital Status: Single  Alcohol History:  reports current alcohol use.  Tobacco History:  reports that she has never smoked. She has never used smokeless tobacco.  Drug History:  reports no history of drug " use.    Review of patient's allergies indicates:  No Known Allergies    Current Outpatient Medications   Medication Sig Dispense Refill    ferrous sulfate, dried (SLOW FE) 160 mg (50 mg iron) TbSR Take 160 mg by mouth once daily.       No current facility-administered medications for this visit.       Review of Systems   Constitutional: Negative for chills, fatigue, fever and unexpected weight change.   HENT: Negative for hearing loss and trouble swallowing.    Eyes: Negative for photophobia and visual disturbance.   Respiratory: Negative for cough, shortness of breath and wheezing.    Cardiovascular: Negative for chest pain, palpitations and leg swelling.   Gastrointestinal: Negative for abdominal pain and nausea.   Genitourinary: Negative for dysuria and frequency.   Musculoskeletal: Positive for gait problem. Negative for arthralgias, back pain, joint swelling and myalgias.   Skin: Negative for rash and wound.   Neurological: Negative for tremors, seizures, weakness, numbness and headaches.   Hematological: Does not bruise/bleed easily.         Objective:        Physical Exam:   Foot Exam    General  General Appearance: appears stated age and healthy   Orientation: alert and oriented to person, place, and time   Affect: appropriate   Gait: unimpaired       Right Foot/Ankle     Inspection and Palpation  Ecchymosis: none  Tenderness: plantar fascia   Swelling: none   Arch: normal  Hallux valgus: yes  Skin Exam: skin intact; no drainage, no ulcer and no erythema   Neurovascular  Dorsalis pedis: 2+  Posterior tibial: 2+  Capillary Refill: 2+  Varicose veins: not present  Saphenous nerve sensation: normal  Tibial nerve sensation: normal  Superficial peroneal nerve sensation: normal  Deep peroneal nerve sensation: normal  Sural nerve sensation: normal    Edema  Type of edema: non-pitting    Muscle Strength  Ankle dorsiflexion: 5  Ankle plantar flexion: 5  Ankle inversion: 5  Ankle eversion: 5  Great toe extension:  5  Great toe flexion: 5    Range of Motion    Passive  Ankle dorsiflexion: 5      Tests  Anterior drawer: negative   Talar tilt: negative   PT Tinel's sign: negative    Paresthesia: negative  Comments  Pain on palpation of the infeior medial heel, central plantar heel and mid arch . No pain present  with side to side compression of the calcaneus. Negative tinnel's sign  at the tarsal tunnel. Negative Monroe's nerve pain. Negative Calcaneal nerve pain. No soft tissue masses. Pain absent  with dorsiflexion of the ankle. No edema, erythema, or ecchymosis noted.       Left Foot/Ankle      Inspection and Palpation  Ecchymosis: none  Tenderness: plantar fascia   Swelling: none   Arch: normal  Hallux valgus: yes  Skin Exam: skin intact; no drainage, no ulcer and no erythema   Neurovascular  Dorsalis pedis: 2+  Posterior tibial: 2+  Capillary refill: 2+  Varicose veins: not present  Saphenous nerve sensation: normal  Tibial nerve sensation: normal  Superficial peroneal nerve sensation: normal  Deep peroneal nerve sensation: normal  Sural nerve sensation: normal    Edema  Type of edema: non-pitting    Muscle Strength  Ankle dorsiflexion: 5  Ankle plantar flexion: 5  Ankle inversion: 5  Ankle eversion: 5  Great toe extension: 5  Great toe flexion: 5    Range of Motion    Passive  Ankle dorsiflexion: 5      Tests  Anterior drawer: negative   Talar tilt: negative   PT Tinel's sign: negative  Paresthesia: negative  Comments  Pain on palpation of the infeior medial heel and central plantar heel. No pain present  with side to side compression of the calcaneus. Negative tinnel's sign  at the tarsal tunnel. Negative Monroe's nerve pain. Negative Calcaneal nerve pain. No soft tissue masses. Pain absent  with dorsiflexion of the ankle. No edema, erythema, or ecchymosis noted.       Physical Exam  Cardiovascular:      Pulses:           Dorsalis pedis pulses are 2+ on the right side and 2+ on the left side.        Posterior tibial  pulses are 2+ on the right side and 2+ on the left side.   Musculoskeletal:      Right foot: Bunion present.      Left foot: Bunion present.   Feet:      Right foot:      Skin integrity: No ulcer or erythema.      Left foot:      Skin integrity: No ulcer or erythema.               Right Ankle/Foot Exam     Comments:  Pain on palpation of the infeior medial heel, central plantar heel and mid arch . No pain present  with side to side compression of the calcaneus. Negative tinnel's sign  at the tarsal tunnel. Negative Monroe's nerve pain. Negative Calcaneal nerve pain. No soft tissue masses. Pain absent  with dorsiflexion of the ankle. No edema, erythema, or ecchymosis noted.       Left Ankle/Foot Exam     Comments:  Pain on palpation of the infeior medial heel and central plantar heel. No pain present  with side to side compression of the calcaneus. Negative tinnel's sign  at the tarsal tunnel. Negative Monroe's nerve pain. Negative Calcaneal nerve pain. No soft tissue masses. Pain absent  with dorsiflexion of the ankle. No edema, erythema, or ecchymosis noted.         Muscle Strength   Right Lower Extremity   Ankle Dorsiflexion:  5   Plantar flexion:  5/5  Left Lower Extremity   Ankle Dorsiflexion:  5   Plantar flexion:  5/5     Vascular Exam     Right Pulses  Dorsalis Pedis:      2+  Posterior Tibial:      2+        Left Pulses  Dorsalis Pedis:      2+  Posterior Tibial:      2+             Imaging: X-Ray Foot Complete 3 view Bilateral  Narrative: EXAMINATION:  XR FOOT COMPLETE 3 VIEW BILATERAL    CLINICAL HISTORY:  Pain in right foot    TECHNIQUE:  AP, lateral, and oblique views of both feet were performed.    COMPARISON:  None    FINDINGS:  No acute fracture, dislocation, or osseous destructive process appreciated radiographically.  There is right Achilles insertion calcaneal enthesophyte formation.  There is bunion formation at the great toe metatarsal heads and degenerative change of the left great toe MTP  joint.  There is bilateral great toe hallux valgus, left greater than right.  No soft tissue abnormalities.  Impression: As above    Electronically signed by: Kishore Griffiths MD  Date:    05/23/2022  Time:    10:38               Assessment:       1. Plantar fasciitis    2. Pain in both feet    3. Pain of right heel    4. Chronic pain of both feet      Plan:   Plantar fasciitis    Pain in both feet    Pain of right heel    Chronic pain of both feet  -     Ambulatory referral/consult to Podiatry      Follow up if symptoms worsen or fail to improve.    Procedures        Plantar Fasciitis Level I Treatment:   Anti-inflammatory  No bare feet  Over the counter insert  Restrict activity level   Use running shoes at full time  No impact exercise and stretch gastrocnemius muscle      Counseling:     I provided patient education verbally regarding:   Patient diagnosis, treatment options, as well as alternatives, risks, and benefits.     Discussed different treatment options for heel pain. I gave written and verbal instructions on heel cord stretching and this was demonstrated for the patient. Patient expressed understanding. Discussed wearing appropriate shoe gear and avoiding flats, slippers, sandals, barefoot, and sockfeet. Recommended arch supports. My recommendation for OTC supports is Spenco polysorb replacement insoles or patient may elect more aggressive treatment with prescription arch supports. We also discussed cortisone injections and NSAID therapy.       This note was created using Dragon voice recognition software that occasionally misinterpreted phrases or words.

## 2022-07-08 NOTE — PATIENT INSTRUCTIONS
Your Diabetes Foot Care Program    Every day you depend on your feet to keep you moving. But when you have diabetes, your feet need special care. Even a small foot problem can become very serious. So dont take your feet for granted. By working with your diabetes healthcare team, you can learn how to protect your feet and keep them healthy.  Evaluating your feet  An evaluation helps your healthcare provider check the condition of your feet. The evaluation includes a review of your diabetes history and overall health. It may also include a foot exam, X-rays, or other tests. These can help show problems beneath the skin that you cant see or feel.  Medical history  You will be asked about your overall health and any history of foot problems. Youll also discuss your diabetes history, such as whether your blood sugar level has changed over time. It also includes questions about sensations of pain, tingling, pins and needles, or numbness. Your healthcare provider will also want to know if you have high blood pressure and heart disease, or if you smoke. Be sure to mention any medicines (including over-the-counter), supplements, or herbal remedies you take.  Foot exam  A foot exam checks the condition of different parts of your foot. First, your skin and nails are examined for any signs of infection. Blood flow is checked by feeling for the pulses in each foot. You may also have tests to study the nerves in the foot. These include using a small filament (wire) to see how sensitive your feet are. In certain cases, you will be asked to walk a short distance to check for bone, joint, and muscle problems.  Diagnostic tests  If needed, your healthcare provider will suggest certain tests to learn more about your feet. These include:  Doppler tests to measure blood flow in the feet and lower leg.  X-rays, which can show bone or joint problems.  Other imaging tests, such as an MRI (magnetic resonance imaging), bone scan, and CT  (computed tomography) scan. These can help show bone infections.  Other tests, such as vascular tests, which study the blood flow in your feet and legs. You may also have nerve studies to learn how sensitive your feet are.  Creating a foot care program  Based on the evaluation, your healthcare provider will create a foot care program for you. Your program may be as simple as starting a daily self-care routine and changing the types of shoes your wear. It may also involve treating minor foot problems, such as a corn or blister. In some cases, surgery will be needed to treat an infection or mechanical problems, such as hammer toes.  Preventing problems  When you have diabetes, its easier to prevent problems than to treat them later on. So see your healthcare team for regular checkups and foot care. Your healthcare team can also help you learn more about caring for your feet at home. For example, you may be told to avoid walking barefoot. Or you may be told that special footwear is needed to protect your feet.  Have regular checkups  Foot problems can develop quickly. So be sure to follow your healthcare teams schedule for regular checkups. During office visits, take off your shoes and socks as soon as you get in the exam room. Ask your healthcare provider to examine your feet for problems. This will make it easier to find and treat small skin irritations before they get worse. Regular checkups can also help keep track of the blood flow and feeling in your feet. If you have neuropathy (lack of feeling in your feet), you will need to have checkups more often.  Learn about self-care  The more you know about diabetes and your feet, the easier it will be to prevent problems. Members of your healthcare team can teach you how to inspect your feet and teach you to look for warning signs. They can also give you other foot care tips. During office visits, be sure to ask any questions you have.  Date Last Reviewed: 7/1/2016  ©  0733-4298 [employer]. [address] [place] [address], [place] [address]. All rights reserved. This information is not intended as a substitute for professional medical care. Always follow your healthcare professional's instructions.       Understanding Plantar Fasciitis    Plantar fasciitis is a condition that causes foot and heel pain. The plantar fascia is a tough band of tissue that runs across the bottom of the foot from the heel to the toes. This tissue pulls on the heel bone. It supports the arch of the foot as it pushes off the ground. If the tissue becomes irritated or red and swollen (inflamed), it is called plantar fasciitis.  How to say it  PLAN-tuhr fa-see-IY-tis     What causes plantar fasciitis?  Plantar fasciitis most often occurs from overusing the plantar fascia. The tissue may become damaged from activities that put repeated stress on the heel and foot. Or it may wear down over time with age and ankle stiffness. You are more likely to have plantar fasciitis if you:  Do activities that require a lot of running, jumping, or dancing  Have a job that requires being on your feet for long periods  Are overweight or obese  Have certain foot problems, such as a tight Achilles tendon, flat feet, or high arches  Often wear poorly fitting shoes    Symptoms of plantar fasciitis  The condition most often causes pain in the heel and the bottom of the foot. The pain may occur when you take your first steps in the morning. It may get better as you walk throughout the day. But as you continue to put weight on the foot, the pain often returns. Pain may also occur after standing or sitting for long periods.    Treating plantar fasciitis  Treatments for plantar fasciitis include:  Resting the foot. This involves limiting movements that make your foot hurt. You may also need to avoid certain sports and types of work for a time.  Using cold packs. Put an ice pack on the heel and foot to help reduce pain and  swelling.  Taking pain medicines. Prescription and over-the-counter pain medicines can help relieve pain and swelling.  Using heel cups or foot inserts (orthotics). These are placed in the shoes to help support the heel or arch and cushion the heel. You may also be told to buy proper-fitting shoes with good arch support and cushioned soles.  Taping the foot. This supports the arch and limits the movement of the plantar fascia to help relieve symptoms.  Wearing a night splint. This stretches the plantar fascia and leg muscles while you sleep. This may help relieve pain.  Doing exercises and physical therapy. These stretch and strengthen the plantar fascia and the muscles in the leg that support the heel and foot.  Getting shots of medicine into the foot. These may help relieve symptoms for a time.  Having surgery. This may be needed if other treatments fail to relieve symptoms. During surgery, the surgeon may partially cut the plantar fascia to release tension.    Possible complications of plantar fasciitis  Without proper care and treatment, healing may take longer than normal. Also, symptoms may continue or get worse. Over time, the plantar fascia may be damaged. This can make it hard to walk or even stand without pain.    When to call your healthcare provider  Call your healthcare provider right away if you have any of these:  Fever of 100.4°F (38°C) or higher, or as directed  Symptoms that dont get better with treatment, or get worse  New symptoms, such as numbness, tingling, or weakness in the foot     Date Last Reviewed: 3/10/2016  © 6637-2322 [employer]. [address] [place] [address], [place] [address]. All rights reserved. This information is not intended as a substitute for professional medical care. Always follow your healthcare professional's instructions.

## 2022-09-26 ENCOUNTER — HOSPITAL ENCOUNTER (OUTPATIENT)
Dept: RADIOLOGY | Facility: HOSPITAL | Age: 67
Discharge: HOME | End: 2022-09-26
Attending: NURSE PRACTITIONER
Payer: COMMERCIAL

## 2022-09-26 DIAGNOSIS — M79.605 LEFT LEG PAIN: ICD-10-CM

## 2022-09-26 PROCEDURE — 93971 EXTREMITY STUDY: CPT | Mod: TC,PO,LT

## 2022-09-27 NOTE — PROGRESS NOTES
Informed patient regarding the compression stockings all understanding. Patient did state that her pain and swelling is only In the ankles not the left leg.

## 2022-09-29 ENCOUNTER — PATIENT MESSAGE (OUTPATIENT)
Dept: ADMINISTRATIVE | Facility: OTHER | Age: 67
End: 2022-09-29
Payer: COMMERCIAL

## 2022-09-30 ENCOUNTER — NURSE TRIAGE (OUTPATIENT)
Dept: ADMINISTRATIVE | Facility: CLINIC | Age: 67
End: 2022-09-30
Payer: COMMERCIAL

## 2022-09-30 NOTE — TELEPHONE ENCOUNTER
[facility] call -     Pt c/o  tested positive yesterday, congestion in head and chest, diarrhea for 2 days, HA improved. Pt c/o constant pressure in chest and wonders if she needs to go to the ER for it. Covid protocol followed and pt advised to go to the ER now. Education provided on covid and isolation and to tell er staff she is covid positive and to wear a mask. Alert and oriented, Pt agrees to follow up with dispo. No pcp listed to route encounter to at this time.   Reason for Disposition   Chest pain or pressure  (Exception: MILD central chest pain, present only when coughing)    Additional Information   Negative: SEVERE difficulty breathing (e.g., struggling for each breath, speaks in single words)   Negative: Difficult to awaken or acting confused (e.g., disoriented, slurred speech)   Negative: Bluish (or gray) lips or face now   Negative: Shock suspected (e.g., cold/pale/clammy skin, too weak to stand, low BP, rapid pulse)   Negative: Sounds like a life-threatening emergency to the triager   Negative: SEVERE or constant chest pain or pressure  (Exception: Mild central chest pain, present only when coughing.)   Negative: MODERATE difficulty breathing (e.g., speaks in phrases, SOB even at rest, pulse 100-120)   Negative: Headache and stiff neck (can't touch chin to chest)   Negative: Oxygen level (e.g., pulse oximetry) 90 percent or lower    Protocols used: Coronavirus (COVID-19) Diagnosed or [id number]

## 2022-10-25 ENCOUNTER — PATIENT MESSAGE (OUTPATIENT)
Dept: FAMILY MEDICINE | Facility: CLINIC | Age: 67
End: 2022-10-25
Payer: COMMERCIAL

## 2022-10-25 DIAGNOSIS — Z12.31 BREAST CANCER SCREENING BY MAMMOGRAM: Primary | ICD-10-CM

## 2022-11-05 ENCOUNTER — PATIENT MESSAGE (OUTPATIENT)
Dept: FAMILY MEDICINE | Facility: CLINIC | Age: 67
End: 2022-11-05
Payer: COMMERCIAL

## 2022-12-07 ENCOUNTER — HOSPITAL ENCOUNTER (OUTPATIENT)
Dept: RADIOLOGY | Facility: CLINIC | Age: 67
Discharge: HOME | End: 2022-12-07
Attending: NURSE PRACTITIONER
Payer: COMMERCIAL

## 2022-12-07 DIAGNOSIS — Z12.31 BREAST CANCER SCREENING BY MAMMOGRAM: ICD-10-CM

## 2022-12-07 PROCEDURE — 77063 BREAST TOMOSYNTHESIS BI: CPT | Mod: TC,PO

## 2022-12-07 PROCEDURE — 77067 SCR MAMMO BI INCL CAD: CPT | Mod: 26,,, | Performed by: RADIOLOGY

## 2022-12-07 PROCEDURE — 77067 SCR MAMMO BI INCL CAD: CPT | Mod: TC,PO

## 2022-12-07 PROCEDURE — 77063 BREAST TOMOSYNTHESIS BI: CPT | Mod: 26,,, | Performed by: RADIOLOGY

## 2022-12-07 PROCEDURE — 77067 MAMMO DIGITAL SCREENING BILAT WITH TOMO: ICD-10-PCS | Mod: 26,,, | Performed by: RADIOLOGY

## 2022-12-07 PROCEDURE — 77063 MAMMO DIGITAL SCREENING BILAT WITH TOMO: ICD-10-PCS | Mod: 26,,, | Performed by: RADIOLOGY

## 2023-01-16 ENCOUNTER — ANESTHESIA (OUTPATIENT)
Dept: SURGERY | Facility: HOSPITAL | Age: 68
End: 2023-01-16
Payer: COMMERCIAL

## 2023-01-16 ENCOUNTER — ANESTHESIA EVENT (OUTPATIENT)
Dept: SURGERY | Facility: HOSPITAL | Age: 68
End: 2023-01-16
Payer: COMMERCIAL

## 2023-01-16 ENCOUNTER — HOSPITAL ENCOUNTER (OUTPATIENT)
Facility: HOSPITAL | Age: 68
Discharge: HOME | End: 2023-01-18
Attending: EMERGENCY MEDICINE | Admitting: FAMILY MEDICINE
Payer: COMMERCIAL

## 2023-01-16 DIAGNOSIS — R00.1 BRADYCARDIA: ICD-10-CM

## 2023-01-16 DIAGNOSIS — K81.0 ACUTE CHOLECYSTITIS: Primary | ICD-10-CM

## 2023-01-16 DIAGNOSIS — R52 PAIN: ICD-10-CM

## 2023-01-16 DIAGNOSIS — R07.9 CHEST PAIN: ICD-10-CM

## 2023-01-16 LAB
ALBUMIN SERPL BCP-MCNC: 4.1 G/DL (ref 3.5–5.2)
ALP SERPL-CCNC: 59 U/L (ref 55–135)
ALT SERPL W/O P-5'-P-CCNC: 26 U/L (ref 10–44)
ANION GAP SERPL CALC-SCNC: 8 MMOL/L (ref 8–16)
AST SERPL-CCNC: 29 U/L (ref 10–40)
BASOPHILS # BLD AUTO: 0.03 K/UL (ref 0–0.2)
BASOPHILS NFR BLD: 0.5 % (ref 0–1.9)
BILIRUB SERPL-MCNC: 0.5 MG/DL (ref 0.1–1)
BILIRUB UR QL STRIP: NEGATIVE
BUN SERPL-MCNC: 16 MG/DL (ref 8–23)
CALCIUM SERPL-MCNC: 9.3 MG/DL (ref 8.7–10.5)
CHLORIDE SERPL-SCNC: 105 MMOL/L (ref 95–110)
CLARITY UR: CLEAR
CO2 SERPL-SCNC: 24 MMOL/L (ref 23–29)
COLOR UR: YELLOW
CREAT SERPL-MCNC: 0.9 MG/DL (ref 0.5–1.4)
DIFFERENTIAL METHOD: ABNORMAL
EOSINOPHIL # BLD AUTO: 0.1 K/UL (ref 0–0.5)
EOSINOPHIL NFR BLD: 1.8 % (ref 0–8)
ERYTHROCYTE [DISTWIDTH] IN BLOOD BY AUTOMATED COUNT: 14 % (ref 11.5–14.5)
EST. GFR  (NO RACE VARIABLE): >60 ML/MIN/1.73 M^2
GLUCOSE SERPL-MCNC: 111 MG/DL (ref 70–110)
GLUCOSE SERPL-MCNC: 141 MG/DL (ref 70–110)
GLUCOSE UR QL STRIP: NEGATIVE
HCT VFR BLD AUTO: 36.5 % (ref 37–48.5)
HGB BLD-MCNC: 12.1 G/DL (ref 12–16)
HGB UR QL STRIP: ABNORMAL
IMM GRANULOCYTES # BLD AUTO: 0.02 K/UL (ref 0–0.04)
IMM GRANULOCYTES NFR BLD AUTO: 0.4 % (ref 0–0.5)
KETONES UR QL STRIP: NEGATIVE
LEUKOCYTE ESTERASE UR QL STRIP: NEGATIVE
LIPASE SERPL-CCNC: 43 U/L (ref 4–60)
LYMPHOCYTES # BLD AUTO: 1.9 K/UL (ref 1–4.8)
LYMPHOCYTES NFR BLD: 34.5 % (ref 18–48)
MCH RBC QN AUTO: 29.7 PG (ref 27–31)
MCHC RBC AUTO-ENTMCNC: 33.2 G/DL (ref 32–36)
MCV RBC AUTO: 90 FL (ref 82–98)
MONOCYTES # BLD AUTO: 0.4 K/UL (ref 0.3–1)
MONOCYTES NFR BLD: 6.5 % (ref 4–15)
NEUTROPHILS # BLD AUTO: 3.1 K/UL (ref 1.8–7.7)
NEUTROPHILS NFR BLD: 56.3 % (ref 38–73)
NITRITE UR QL STRIP: NEGATIVE
NRBC BLD-RTO: 0 /100 WBC
PH UR STRIP: 6 [PH] (ref 5–8)
PLATELET # BLD AUTO: 299 K/UL (ref 150–450)
PMV BLD AUTO: 9.5 FL (ref 9.2–12.9)
POTASSIUM SERPL-SCNC: 3.6 MMOL/L (ref 3.5–5.1)
PROT SERPL-MCNC: 8.5 G/DL (ref 6–8.4)
PROT UR QL STRIP: ABNORMAL
RBC # BLD AUTO: 4.08 M/UL (ref 4–5.4)
SODIUM SERPL-SCNC: 137 MMOL/L (ref 136–145)
SP GR UR STRIP: 1.02 (ref 1–1.03)
URN SPEC COLLECT METH UR: ABNORMAL
UROBILINOGEN UR STRIP-ACNC: NEGATIVE EU/DL
WBC # BLD AUTO: 5.53 K/UL (ref 3.9–12.7)

## 2023-01-16 PROCEDURE — 36000709 HC OR TIME LEV III EA ADD 15 MIN: Performed by: SURGERY

## 2023-01-16 PROCEDURE — 25000003 PHARM REV CODE 250: Performed by: FAMILY MEDICINE

## 2023-01-16 PROCEDURE — D9220A PRA ANESTHESIA: Mod: ANES,,, | Performed by: ANESTHESIOLOGY

## 2023-01-16 PROCEDURE — 88304 TISSUE EXAM BY PATHOLOGIST: CPT | Mod: TC

## 2023-01-16 PROCEDURE — D9220A PRA ANESTHESIA: ICD-10-PCS | Mod: CRNA,,, | Performed by: NURSE ANESTHETIST, CERTIFIED REGISTERED

## 2023-01-16 PROCEDURE — 93005 ELECTROCARDIOGRAM TRACING: CPT | Mod: 59 | Performed by: SPECIALIST

## 2023-01-16 PROCEDURE — 80053 COMPREHEN METABOLIC PANEL: CPT | Performed by: NURSE PRACTITIONER

## 2023-01-16 PROCEDURE — 47562 PR LAP,CHOLECYSTECTOMY: ICD-10-PCS | Mod: ,,, | Performed by: SURGERY

## 2023-01-16 PROCEDURE — 85025 COMPLETE CBC W/AUTO DIFF WBC: CPT | Performed by: NURSE PRACTITIONER

## 2023-01-16 PROCEDURE — 82962 GLUCOSE BLOOD TEST: CPT | Performed by: SURGERY

## 2023-01-16 PROCEDURE — G0378 HOSPITAL OBSERVATION PER HR: HCPCS

## 2023-01-16 PROCEDURE — 37000008 HC ANESTHESIA 1ST 15 MINUTES: Performed by: SURGERY

## 2023-01-16 PROCEDURE — 83690 ASSAY OF LIPASE: CPT | Performed by: NURSE PRACTITIONER

## 2023-01-16 PROCEDURE — 93010 EKG 12-LEAD: ICD-10-PCS | Mod: ,,, | Performed by: SPECIALIST

## 2023-01-16 PROCEDURE — 96365 THER/PROPH/DIAG IV INF INIT: CPT

## 2023-01-16 PROCEDURE — 25000003 PHARM REV CODE 250: Performed by: SURGERY

## 2023-01-16 PROCEDURE — 63600175 PHARM REV CODE 636 W HCPCS: Performed by: EMERGENCY MEDICINE

## 2023-01-16 PROCEDURE — 99285 EMERGENCY DEPT VISIT HI MDM: CPT | Mod: 25

## 2023-01-16 PROCEDURE — C9290 INJ, BUPIVACAINE LIPOSOME: HCPCS | Performed by: SURGERY

## 2023-01-16 PROCEDURE — D9220A PRA ANESTHESIA: ICD-10-PCS | Mod: ANES,,, | Performed by: ANESTHESIOLOGY

## 2023-01-16 PROCEDURE — 96366 THER/PROPH/DIAG IV INF ADDON: CPT

## 2023-01-16 PROCEDURE — 96375 TX/PRO/DX INJ NEW DRUG ADDON: CPT | Mod: 59

## 2023-01-16 PROCEDURE — 47562 LAPAROSCOPIC CHOLECYSTECTOMY: CPT | Mod: ,,, | Performed by: SURGERY

## 2023-01-16 PROCEDURE — 63600175 PHARM REV CODE 636 W HCPCS: Performed by: SURGERY

## 2023-01-16 PROCEDURE — 25000003 PHARM REV CODE 250: Performed by: NURSE ANESTHETIST, CERTIFIED REGISTERED

## 2023-01-16 PROCEDURE — 27000080 OPTIME MED/SURG SUP & DEVICES GENERAL CLASSIFICATION: Performed by: SURGERY

## 2023-01-16 PROCEDURE — 25000003 PHARM REV CODE 250: Performed by: ANESTHESIOLOGY

## 2023-01-16 PROCEDURE — D9220A PRA ANESTHESIA: Mod: CRNA,,, | Performed by: NURSE ANESTHETIST, CERTIFIED REGISTERED

## 2023-01-16 PROCEDURE — 96376 TX/PRO/DX INJ SAME DRUG ADON: CPT | Mod: 59

## 2023-01-16 PROCEDURE — 37000009 HC ANESTHESIA EA ADD 15 MINS: Performed by: SURGERY

## 2023-01-16 PROCEDURE — 93010 ELECTROCARDIOGRAM REPORT: CPT | Mod: ,,, | Performed by: SPECIALIST

## 2023-01-16 PROCEDURE — 63600175 PHARM REV CODE 636 W HCPCS: Performed by: ANESTHESIOLOGY

## 2023-01-16 PROCEDURE — 63600175 PHARM REV CODE 636 W HCPCS: Performed by: NURSE ANESTHETIST, CERTIFIED REGISTERED

## 2023-01-16 PROCEDURE — 36000708 HC OR TIME LEV III 1ST 15 MIN: Performed by: SURGERY

## 2023-01-16 PROCEDURE — 71000039 HC RECOVERY, EACH ADD'L HOUR: Performed by: SURGERY

## 2023-01-16 PROCEDURE — 81003 URINALYSIS AUTO W/O SCOPE: CPT | Performed by: NURSE PRACTITIONER

## 2023-01-16 PROCEDURE — 27201423 OPTIME MED/SURG SUP & DEVICES STERILE SUPPLY: Performed by: SURGERY

## 2023-01-16 PROCEDURE — 87040 BLOOD CULTURE FOR BACTERIA: CPT | Mod: 59 | Performed by: EMERGENCY MEDICINE

## 2023-01-16 PROCEDURE — 25000003 PHARM REV CODE 250: Performed by: EMERGENCY MEDICINE

## 2023-01-16 PROCEDURE — 63600175 PHARM REV CODE 636 W HCPCS

## 2023-01-16 PROCEDURE — 71000033 HC RECOVERY, INTIAL HOUR: Performed by: SURGERY

## 2023-01-16 PROCEDURE — 63600175 PHARM REV CODE 636 W HCPCS: Performed by: FAMILY MEDICINE

## 2023-01-16 RX ORDER — HYDROCODONE BITARTRATE AND ACETAMINOPHEN 5; 325 MG/1; MG/1
1 TABLET ORAL EVERY 4 HOURS PRN
Status: DISCONTINUED | OUTPATIENT
Start: 2023-01-16 | End: 2023-01-18 | Stop reason: HOSPADM

## 2023-01-16 RX ORDER — HYDROMORPHONE HYDROCHLORIDE 1 MG/ML
0.2 INJECTION, SOLUTION INTRAMUSCULAR; INTRAVENOUS; SUBCUTANEOUS EVERY 5 MIN PRN
Status: DISCONTINUED | OUTPATIENT
Start: 2023-01-16 | End: 2023-01-16 | Stop reason: HOSPADM

## 2023-01-16 RX ORDER — OXYCODONE HYDROCHLORIDE 5 MG/1
5 TABLET ORAL
Status: DISCONTINUED | OUTPATIENT
Start: 2023-01-16 | End: 2023-01-16 | Stop reason: HOSPADM

## 2023-01-16 RX ORDER — SIMETHICONE 80 MG
1 TABLET,CHEWABLE ORAL 4 TIMES DAILY PRN
Status: DISCONTINUED | OUTPATIENT
Start: 2023-01-16 | End: 2023-01-18 | Stop reason: HOSPADM

## 2023-01-16 RX ORDER — MIDAZOLAM HYDROCHLORIDE 1 MG/ML
INJECTION INTRAMUSCULAR; INTRAVENOUS
Status: DISCONTINUED | OUTPATIENT
Start: 2023-01-16 | End: 2023-01-16

## 2023-01-16 RX ORDER — MORPHINE SULFATE 2 MG/ML
INJECTION, SOLUTION INTRAMUSCULAR; INTRAVENOUS
Status: COMPLETED
Start: 2023-01-16 | End: 2023-01-16

## 2023-01-16 RX ORDER — ONDANSETRON 2 MG/ML
4 INJECTION INTRAMUSCULAR; INTRAVENOUS
Status: COMPLETED | OUTPATIENT
Start: 2023-01-16 | End: 2023-01-16

## 2023-01-16 RX ORDER — HYDROMORPHONE HYDROCHLORIDE 1 MG/ML
1 INJECTION, SOLUTION INTRAMUSCULAR; INTRAVENOUS; SUBCUTANEOUS
Status: DISCONTINUED | OUTPATIENT
Start: 2023-01-16 | End: 2023-01-16

## 2023-01-16 RX ORDER — PROPOFOL 10 MG/ML
VIAL (ML) INTRAVENOUS
Status: DISCONTINUED | OUTPATIENT
Start: 2023-01-16 | End: 2023-01-16

## 2023-01-16 RX ORDER — FENTANYL CITRATE 50 UG/ML
INJECTION, SOLUTION INTRAMUSCULAR; INTRAVENOUS
Status: DISCONTINUED | OUTPATIENT
Start: 2023-01-16 | End: 2023-01-16

## 2023-01-16 RX ORDER — FAMOTIDINE 10 MG/ML
INJECTION INTRAVENOUS
Status: DISCONTINUED | OUTPATIENT
Start: 2023-01-16 | End: 2023-01-16

## 2023-01-16 RX ORDER — DIPHENHYDRAMINE HYDROCHLORIDE 50 MG/ML
12.5 INJECTION INTRAMUSCULAR; INTRAVENOUS
Status: DISCONTINUED | OUTPATIENT
Start: 2023-01-16 | End: 2023-01-16 | Stop reason: HOSPADM

## 2023-01-16 RX ORDER — HYDROCODONE BITARTRATE AND ACETAMINOPHEN 5; 325 MG/1; MG/1
1 TABLET ORAL EVERY 4 HOURS PRN
Status: DISCONTINUED | OUTPATIENT
Start: 2023-01-16 | End: 2023-01-16

## 2023-01-16 RX ORDER — MORPHINE SULFATE 4 MG/ML
4 INJECTION, SOLUTION INTRAMUSCULAR; INTRAVENOUS EVERY 4 HOURS PRN
Status: DISCONTINUED | OUTPATIENT
Start: 2023-01-16 | End: 2023-01-18 | Stop reason: HOSPADM

## 2023-01-16 RX ORDER — ONDANSETRON 2 MG/ML
4 INJECTION INTRAMUSCULAR; INTRAVENOUS DAILY PRN
Status: DISCONTINUED | OUTPATIENT
Start: 2023-01-16 | End: 2023-01-16 | Stop reason: HOSPADM

## 2023-01-16 RX ORDER — IBUPROFEN 200 MG
16 TABLET ORAL
Status: DISCONTINUED | OUTPATIENT
Start: 2023-01-16 | End: 2023-01-18 | Stop reason: HOSPADM

## 2023-01-16 RX ORDER — GLUCAGON 1 MG
1 KIT INJECTION
Status: DISCONTINUED | OUTPATIENT
Start: 2023-01-16 | End: 2023-01-18 | Stop reason: HOSPADM

## 2023-01-16 RX ORDER — MUPIROCIN 20 MG/G
1 OINTMENT TOPICAL 2 TIMES DAILY
Status: DISCONTINUED | OUTPATIENT
Start: 2023-01-16 | End: 2023-01-18 | Stop reason: HOSPADM

## 2023-01-16 RX ORDER — LIDOCAINE HYDROCHLORIDE 20 MG/ML
INJECTION, SOLUTION EPIDURAL; INFILTRATION; INTRACAUDAL; PERINEURAL
Status: DISCONTINUED | OUTPATIENT
Start: 2023-01-16 | End: 2023-01-16

## 2023-01-16 RX ORDER — MORPHINE SULFATE 4 MG/ML
4 INJECTION, SOLUTION INTRAMUSCULAR; INTRAVENOUS
Status: COMPLETED | OUTPATIENT
Start: 2023-01-16 | End: 2023-01-16

## 2023-01-16 RX ORDER — CEFAZOLIN SODIUM 1 G/3ML
INJECTION, POWDER, FOR SOLUTION INTRAMUSCULAR; INTRAVENOUS
Status: DISCONTINUED | OUTPATIENT
Start: 2023-01-16 | End: 2023-01-16

## 2023-01-16 RX ORDER — ACETAMINOPHEN 325 MG/1
650 TABLET ORAL EVERY 8 HOURS PRN
Status: DISCONTINUED | OUTPATIENT
Start: 2023-01-16 | End: 2023-01-18 | Stop reason: HOSPADM

## 2023-01-16 RX ORDER — SODIUM CHLORIDE 9 MG/ML
INJECTION, SOLUTION INTRAVENOUS CONTINUOUS
Status: DISCONTINUED | OUTPATIENT
Start: 2023-01-16 | End: 2023-01-18 | Stop reason: HOSPADM

## 2023-01-16 RX ORDER — SODIUM CHLORIDE 9 MG/ML
1000 INJECTION, SOLUTION INTRAVENOUS
Status: COMPLETED | OUTPATIENT
Start: 2023-01-16 | End: 2023-01-16

## 2023-01-16 RX ORDER — ONDANSETRON 2 MG/ML
4 INJECTION INTRAMUSCULAR; INTRAVENOUS EVERY 6 HOURS PRN
Status: DISCONTINUED | OUTPATIENT
Start: 2023-01-16 | End: 2023-01-18 | Stop reason: HOSPADM

## 2023-01-16 RX ORDER — ONDANSETRON HYDROCHLORIDE 2 MG/ML
INJECTION, SOLUTION INTRAMUSCULAR; INTRAVENOUS
Status: DISCONTINUED | OUTPATIENT
Start: 2023-01-16 | End: 2023-01-16

## 2023-01-16 RX ORDER — TALC
6 POWDER (GRAM) TOPICAL NIGHTLY PRN
Status: DISCONTINUED | OUTPATIENT
Start: 2023-01-16 | End: 2023-01-18 | Stop reason: HOSPADM

## 2023-01-16 RX ORDER — BUPIVACAINE HYDROCHLORIDE AND EPINEPHRINE 5; 5 MG/ML; UG/ML
INJECTION, SOLUTION EPIDURAL; INTRACAUDAL; PERINEURAL
Status: DISCONTINUED | OUTPATIENT
Start: 2023-01-16 | End: 2023-01-16 | Stop reason: HOSPADM

## 2023-01-16 RX ORDER — AMOXICILLIN 250 MG
1 CAPSULE ORAL 2 TIMES DAILY PRN
Status: DISCONTINUED | OUTPATIENT
Start: 2023-01-16 | End: 2023-01-18 | Stop reason: HOSPADM

## 2023-01-16 RX ORDER — IBUPROFEN 200 MG
24 TABLET ORAL
Status: DISCONTINUED | OUTPATIENT
Start: 2023-01-16 | End: 2023-01-18 | Stop reason: HOSPADM

## 2023-01-16 RX ORDER — IPRATROPIUM BROMIDE AND ALBUTEROL SULFATE 2.5; .5 MG/3ML; MG/3ML
3 SOLUTION RESPIRATORY (INHALATION) EVERY 4 HOURS PRN
Status: DISCONTINUED | OUTPATIENT
Start: 2023-01-16 | End: 2023-01-18 | Stop reason: HOSPADM

## 2023-01-16 RX ORDER — POLYETHYLENE GLYCOL 3350 17 G/17G
17 POWDER, FOR SOLUTION ORAL 2 TIMES DAILY PRN
Status: DISCONTINUED | OUTPATIENT
Start: 2023-01-16 | End: 2023-01-18 | Stop reason: HOSPADM

## 2023-01-16 RX ORDER — MORPHINE SULFATE 4 MG/ML
4 INJECTION, SOLUTION INTRAMUSCULAR; INTRAVENOUS
Status: DISCONTINUED | OUTPATIENT
Start: 2023-01-16 | End: 2023-01-16

## 2023-01-16 RX ORDER — HYDROCODONE BITARTRATE AND ACETAMINOPHEN 5; 325 MG/1; MG/1
1 TABLET ORAL ONCE
Status: COMPLETED | OUTPATIENT
Start: 2023-01-16 | End: 2023-01-16

## 2023-01-16 RX ORDER — LANOLIN ALCOHOL/MO/W.PET/CERES
1 CREAM (GRAM) TOPICAL DAILY
Status: DISCONTINUED | OUTPATIENT
Start: 2023-01-16 | End: 2023-01-18 | Stop reason: HOSPADM

## 2023-01-16 RX ORDER — SODIUM CHLORIDE 0.9 % (FLUSH) 0.9 %
10 SYRINGE (ML) INJECTION
Status: DISCONTINUED | OUTPATIENT
Start: 2023-01-16 | End: 2023-01-18 | Stop reason: HOSPADM

## 2023-01-16 RX ORDER — NALOXONE HCL 0.4 MG/ML
0.02 VIAL (ML) INJECTION
Status: DISCONTINUED | OUTPATIENT
Start: 2023-01-16 | End: 2023-01-18 | Stop reason: HOSPADM

## 2023-01-16 RX ORDER — ROCURONIUM BROMIDE 10 MG/ML
INJECTION, SOLUTION INTRAVENOUS
Status: DISCONTINUED | OUTPATIENT
Start: 2023-01-16 | End: 2023-01-16

## 2023-01-16 RX ADMIN — HYDROMORPHONE HYDROCHLORIDE 0.2 MG: 1 INJECTION, SOLUTION INTRAMUSCULAR; INTRAVENOUS; SUBCUTANEOUS at 03:01

## 2023-01-16 RX ADMIN — HYDROCODONE BITARTRATE AND ACETAMINOPHEN 1 TABLET: 5; 325 TABLET ORAL at 09:01

## 2023-01-16 RX ADMIN — FERROUS SULFATE TAB 325 MG (65 MG ELEMENTAL FE) 1 EACH: 325 (65 FE) TAB at 11:01

## 2023-01-16 RX ADMIN — OXYCODONE HYDROCHLORIDE 5 MG: 5 TABLET ORAL at 03:01

## 2023-01-16 RX ADMIN — ROCURONIUM BROMIDE 40 MG: 10 INJECTION, SOLUTION INTRAVENOUS at 01:01

## 2023-01-16 RX ADMIN — PIPERACILLIN SODIUM AND TAZOBACTAM SODIUM 4.5 G: 4; .5 INJECTION, POWDER, LYOPHILIZED, FOR SOLUTION INTRAVENOUS at 09:01

## 2023-01-16 RX ADMIN — MORPHINE SULFATE 4 MG: 2 INJECTION, SOLUTION INTRAMUSCULAR; INTRAVENOUS at 09:01

## 2023-01-16 RX ADMIN — SODIUM CHLORIDE: 0.9 INJECTION, SOLUTION INTRAVENOUS at 11:01

## 2023-01-16 RX ADMIN — FENTANYL CITRATE 50 MCG: 0.05 INJECTION, SOLUTION INTRAMUSCULAR; INTRAVENOUS at 02:01

## 2023-01-16 RX ADMIN — SUGAMMADEX 100 MG: 100 INJECTION, SOLUTION INTRAVENOUS at 02:01

## 2023-01-16 RX ADMIN — MUPIROCIN 1 G: 20 OINTMENT TOPICAL at 09:01

## 2023-01-16 RX ADMIN — MIDAZOLAM HYDROCHLORIDE 2 MG: 1 INJECTION, SOLUTION INTRAMUSCULAR; INTRAVENOUS at 01:01

## 2023-01-16 RX ADMIN — POTASSIUM BICARBONATE 50 MEQ: 977.5 TABLET, EFFERVESCENT ORAL at 11:01

## 2023-01-16 RX ADMIN — LIDOCAINE HYDROCHLORIDE 60 MG: 20 INJECTION, SOLUTION EPIDURAL; INFILTRATION; INTRACAUDAL; PERINEURAL at 01:01

## 2023-01-16 RX ADMIN — SODIUM CHLORIDE 1000 ML: 0.9 INJECTION, SOLUTION INTRAVENOUS at 06:01

## 2023-01-16 RX ADMIN — HYDROCODONE BITARTRATE AND ACETAMINOPHEN 1 TABLET: 5; 325 TABLET ORAL at 10:01

## 2023-01-16 RX ADMIN — FAMOTIDINE 20 MG: 10 INJECTION, SOLUTION INTRAVENOUS at 01:01

## 2023-01-16 RX ADMIN — PIPERACILLIN SODIUM AND TAZOBACTAM SODIUM 3.38 G: 3; .375 INJECTION, POWDER, LYOPHILIZED, FOR SOLUTION INTRAVENOUS at 05:01

## 2023-01-16 RX ADMIN — ONDANSETRON 4 MG: 2 INJECTION INTRAMUSCULAR; INTRAVENOUS at 02:01

## 2023-01-16 RX ADMIN — MEPERIDINE HYDROCHLORIDE 12.5 MG: 50 INJECTION, SOLUTION INTRAMUSCULAR; INTRAVENOUS; SUBCUTANEOUS at 03:01

## 2023-01-16 RX ADMIN — CEFAZOLIN 2 G: 330 INJECTION, POWDER, FOR SOLUTION INTRAMUSCULAR; INTRAVENOUS at 01:01

## 2023-01-16 RX ADMIN — MORPHINE SULFATE 4 MG: 4 INJECTION, SOLUTION INTRAMUSCULAR; INTRAVENOUS at 06:01

## 2023-01-16 RX ADMIN — SODIUM CHLORIDE, SODIUM LACTATE, POTASSIUM CHLORIDE, AND CALCIUM CHLORIDE: .6; .31; .03; .02 INJECTION, SOLUTION INTRAVENOUS at 02:01

## 2023-01-16 RX ADMIN — SODIUM CHLORIDE: 0.9 INJECTION, SOLUTION INTRAVENOUS at 05:01

## 2023-01-16 RX ADMIN — FENTANYL CITRATE 50 MCG: 0.05 INJECTION, SOLUTION INTRAMUSCULAR; INTRAVENOUS at 01:01

## 2023-01-16 RX ADMIN — SODIUM CHLORIDE, SODIUM LACTATE, POTASSIUM CHLORIDE, AND CALCIUM CHLORIDE: .6; .31; .03; .02 INJECTION, SOLUTION INTRAVENOUS at 01:01

## 2023-01-16 RX ADMIN — PROPOFOL 150 MG: 10 INJECTION, EMULSION INTRAVENOUS at 01:01

## 2023-01-16 RX ADMIN — ONDANSETRON 4 MG: 2 INJECTION INTRAMUSCULAR; INTRAVENOUS at 06:01

## 2023-01-16 RX ADMIN — ONDANSETRON 4 MG: 2 INJECTION INTRAMUSCULAR; INTRAVENOUS at 09:01

## 2023-01-16 NOTE — ANESTHESIA PROCEDURE NOTES
Intubation    Date/Time: 1/16/2023 1:33 PM  Performed by: Jimbo Ordonez CRNA  Authorized by: Jt Leyva MD     Intubation:     Induction:  Intravenous    Intubated:  Postinduction    Mask Ventilation:  Easy mask    Attempts:  1    Attempted By:  CRNA    Method of Intubation:  Video laryngoscopy    Blade:  Chu 3    Laryngeal View Grade: Grade I - full view of cords      Difficult Airway Encountered?: No      Complications:  None    Airway Device:  Oral endotracheal tube    Airway Device Size:  7.0    Style/Cuff Inflation:  Cuffed    Inflation Amount (mL):  6    Tube secured:  22    Placement Verified By:  Capnometry    Complicating Factors:  None    Findings Post-Intubation:  BS equal bilateral

## 2023-01-16 NOTE — ANESTHESIA PREPROCEDURE EVALUATION
01/16/2023  Bailey Joseph is a 67 y.o., female.      Patient Active Problem List   Diagnosis    Paresthesias    Pre-diabetes    Iron deficiency       Past Surgical History:   Procedure Laterality Date    BREAST BIOPSY      LIPOMA RESECTION      TONSILLECTOMY          Tobacco Use:  The patient  reports that she has never smoked. She has never used smokeless tobacco.     Results for orders placed or performed during the hospital encounter of 01/16/23   EKG 12-lead    Collection Time: 01/16/23  7:04 AM    Narrative    Test Reason : R52,    Vent. Rate : 041 BPM     Atrial Rate : 041 BPM     P-R Int : 158 ms          QRS Dur : 092 ms      QT Int : 502 ms       P-R-T Axes : 061 005 024 degrees     QTc Int : 414 ms    Marked sinus bradycardia  Cannot rule out Anterior infarct ,age undetermined  Abnormal ECG  No previous ECGs available    Referred By: [email or web address]   SELF           Confirmed By:         Imaging Results          US Abdomen Limited (Final result)  Result time 01/16/23 07:55:59    Final result by Satish Vargas MD (01/16/23 07:55:59)                 Narrative:    Abdominal ultrasound Limited    CLINICAL DATA: Right upper quadrant pain    FINDINGS: Sonographic assessment targeted to the right upper quadrant was performed.    Visualized portions of the pancreas, aorta, and inferior vena cava are unremarkable.    The liver is normal in appearance. Hepatopedal flow is noted within the portal vein. Common bile duct is normal in caliber at 2 mm. Multiple gallstones are noted. The portions of the gallbladder wall are mildly thickened at up to 4 mm. No significant pericholecystic fluid is identified. Technologist reports tenderness directly over the gallbladder during the exam.    IMPRESSION:  1. Gallstones with mild gallbladder wall thickening and positive sonographic Avalos's sign. Correlate clinically  for possible early or mild acute calculous cholecystitis. Follow-up hepatobiliary scan could be performed as clinically indicated.  2. No other abnormalities.    Electronically signed by:  Satish Vargas MD  1/16/2023 7:55 AM [facility] Workstation: [id number]                               Lab Results   Component Value Date    WBC 5.53 01/16/2023    HGB 12.1 01/16/2023    HCT 36.5 (L) 01/16/2023    MCV 90 01/16/2023     01/16/2023     BMP  Lab Results   Component Value Date     01/16/2023    K 3.6 01/16/2023     01/16/2023    CO2 24 01/16/2023    BUN 16 01/16/2023    CREATININE 0.9 01/16/2023    CALCIUM 9.3 01/16/2023    ANIONGAP 8 01/16/2023     (H) 01/16/2023    GLU 95 05/12/2022    GLU 94 12/14/2020       No results found for this or any previous visit.              Pre-op Assessment    I have reviewed the Patient Summary Reports.     I have reviewed the Nursing Notes. I have reviewed the NPO Status.   I have reviewed the Medications.     Review of Systems  Anesthesia Hx:  No problems with previous Anesthesia  Denies Family Hx of Anesthesia complications.   Denies Personal Hx of Anesthesia complications.   Social:  Non-Smoker    Hematology/Oncology:  Hematology Normal        Cardiovascular:  Cardiovascular Normal     Pulmonary:   Asthma (hx childhood asthma) asymptomatic    Hepatic/GI:   GERD (OTC meds prn), well controlled    Musculoskeletal:  Musculoskeletal Normal    Neurological:  Neurology Normal    Endocrine:   Diabetes (pre-diabetes), well controlled, type 2        Physical Exam  General: Well nourished, Cooperative, Alert and Oriented    Airway:  Mallampati: III / II  Mouth Opening: Normal  TM Distance: Normal  Tongue: Normal  Neck ROM: Normal ROM    Dental:  Intact    Chest/Lungs:  Clear to auscultation    Heart:  Rate: Normal  Rhythm: Regular Rhythm  Sounds: Normal    Abdomen:  Normal, Soft, Nontender        Anesthesia Plan  Type of Anesthesia, risks & benefits  discussed:    Anesthesia Type: Gen ETT  Intra-op Monitoring Plan: Standard ASA Monitors  Post Op Pain Control Plan: multimodal analgesia and IV/PO Opioids PRN  Induction:  IV  Airway Plan: Video, Post-Induction  Informed Consent: Informed consent signed with the Patient and all parties understand the risks and agree with anesthesia plan.  All questions answered.   ASA Score: 2 Emergent  Anesthesia Plan Notes:     GETA  IV tylenol  Zofran Pepcid    Ready For Surgery From Anesthesia Perspective.     .

## 2023-01-16 NOTE — OP NOTE
Surgery Date: 1/16/2023     Surgeon(s) and Role:     * Jean-Claude Salazar III, MD - Primary    Assisting Surgeon: None    Pre-op Diagnosis:  Acute cholecystitis [K81.0]    Post-op Diagnosis:  Post-Op Diagnosis Codes:     * Acute calculous cholecystitis    Procedure(s) (LRB):  CHOLECYSTECTOMY, LAPAROSCOPIC (N/A)    Anesthesia: General    Operative Findings: The patient was brought to the operating room and transferred to the operating room table in the supine position.  Patient was given general anesthesia and intubated.  The abdomen was prepped and draped.  A transverse infraumbilical incision was made.  Dissection was carried down through the skin and subcutaneous tissue.  The abdomen was insufflated with the Veress needle. The abdomen was entered with the 5 mm visiport. Under direct visualization, three ports were placed.  One 12 mm was placed in the subxiphoid position, one 5 mm in the right anterior axillary line and the other 5 mm in the mid clavicular line.  The gallbladder body was retracted superiorly and the infundibulum was retracted laterally. It was very distended. The peritoneum overlying the cystic duct and artery was carefully taken down.  The cystic duct and cystic artery were individually isolated and dissected free 360 degrees.  They were individually clipped proximally and distally.  They were transected using endo destiney.  Electrocautery was used to take the gallbladder off the liver bed.  The Endo-Catch bag was used to remove the gallbladder from the abdomen.  Gallbladder was filled with stones. We elongated the extraction site for another cm or so. We irrigated out the right upper quadrant with irrigation.  We checked again and there was no evidence of any bile leak or bleeding from our clips or from the liver bed. The patient tolerated the procedure well. Subxiphoid fascia was closed with interrupted Ethibond. We removed all of our ports. We repaired the skin with 4-0 Monocryl.  After that was  done, the patient was extubated and taken to the recovery room in stable condition. All lap and instrument counts were correct.     Estimated Blood Loss: 10 mL  Complications none.  Instrument counts correct.  Estimated Blood Loss has been documented.         Specimens:   Specimen (24h ago, onward)       Start     Ordered    01/16/23 1416  Specimen to Pathology - Surgery  Once        Comments: Pre-op Diagnosis: Acute cholecystitis [K81.0]Procedure(s):CHOLECYSTECTOMY, LAPAROSCOPIC Number of specimens: 1Name of specimens: gallbladder     Question:  Release to patient  Answer:  Immediate    01/16/23 1416    Signed and Held  Specimen to Pathology - Surgery  Once        Comments: Pre-op Diagnosis: Acute cholecystitis [K81.0]Procedure(s):CHOLECYSTECTOMY, LAPAROSCOPIC Number of specimens: 1Name of specimens: gallbladder     Question:  Release to patient  Answer:  Immediate    Signed and Held                    [id number]

## 2023-01-16 NOTE — H&P
"[facility] Medicine   History & Physical     Patient Name: Bailey Joseph  MRN: [id number]  Admission Date: 1/16/2023  5:07 AM  Attending Physician: Rose Kebede MD  Face-to-Face encounter date: 01/16/2023 10:15 AM    Patient information was obtained from patient, past medical records, ER physician, and ER records.     HISTORY OF PRESENT ILLNESS:     Bailey Joseph is a 67 y.o. Patient Refused female   With PMH of DM2, thyroid dz,  who presents with abdominal pain.  Admitted for acute cholecystitis     Located RUQ without radiation  Onset 3 days ago  Progressively worsening  Severely worsened overnight  +nausea with vomiting  No subjective fever  No chills    REVIEW OF SYSTEMS:     All systems reviewed and are negative except as noted per above.    PAST MEDICAL HISTORY:     Past Medical History:   Diagnosis Date    Diabetes mellitus, type 2     Thyroid disease        PAST SURGICAL HISTORY:     Past Surgical History:   Procedure Laterality Date    BREAST BIOPSY      LIPOMA RESECTION      TONSILLECTOMY         ALLERGIES:   Patient has no known allergies.    FAMILY HISTORY:     Family History   Problem Relation Age of Onset    Diabetes Mother     Diabetes Sister     Diabetes Brother        SOCIAL HISTORY:     Social History     Tobacco Use    Smoking status: Never    Smokeless tobacco: Never   Substance Use Topics    Alcohol use: Yes        Social History     Substance and Sexual Activity   Sexual Activity Yes        HOME MEDICATIONS:     Prior to Admission medications    Medication Sig Start Date End Date Taking? Authorizing Provider   ferrous sulfate, dried (SLOW FE) 160 mg (50 mg iron) TbSR Take 160 mg by mouth once daily.    Historical Provider       PHYSICAL EXAM:     BP (!) 156/70   Pulse (!) 49   Temp 98.1 °F (36.7 °C) (Oral)   Resp (!) 22   Ht 5' 8" (1.727 m)   Wt 85.7 kg (189 lb)   SpO2 99%   BMI 28.74 kg/m²     Pt seen after surgery    Gen: alert, responsive  HEENT:  Eyes - no " pallor  External ears with no lesions  Nares patent  Mouth/Throat:  trachea midline  CV: RRR  Lungs: CTA B/L  Abd: +hypoactive BS, surgical site c/d/I, appropriately tender, ND  Ext: no atrophy or edema  Skin: warm, dry  Neuro: grossly intact  Psych: pleasant     LABS AND IMAGING:     Labs Reviewed   CBC W/ AUTO DIFFERENTIAL - Abnormal; Notable for the following components:       Result Value    Hematocrit 36.5 (*)     All other components within normal limits    Narrative:     For upper or mid abdominal pain.   COMPREHENSIVE METABOLIC PANEL - Abnormal; Notable for the following components:    Glucose 141 (*)     Total Protein 8.5 (*)     All other components within normal limits    Narrative:     For upper or mid abdominal pain.   URINALYSIS, REFLEX TO URINE CULTURE - Abnormal; Notable for the following components:    Protein, UA Trace (*)     Occult Blood UA Trace (*)     All other components within normal limits    Narrative:     In and Out Cath as needed it patient unable to void  Specimen Source->Urine   CULTURE, BLOOD   CULTURE, BLOOD   LIPASE    Narrative:     For upper or mid abdominal pain.   HEMOGLOBIN A1C   TROPONIN I HIGH SENSITIVITY       Imaging Results              US Abdomen Limited (Final result)  Result time 01/16/23 07:55:59      Final result by Satish Vargas MD (01/16/23 07:55:59)                   Narrative:    Abdominal ultrasound Limited    CLINICAL DATA: Right upper quadrant pain    FINDINGS: Sonographic assessment targeted to the right upper quadrant was performed.    Visualized portions of the pancreas, aorta, and inferior vena cava are unremarkable.    The liver is normal in appearance. Hepatopedal flow is noted within the portal vein. Common bile duct is normal in caliber at 2 mm. Multiple gallstones are noted. The portions of the gallbladder wall are mildly thickened at up to 4 mm. No significant pericholecystic fluid is identified. Technologist reports tenderness directly over the  gallbladder during the exam.    IMPRESSION:  1. Gallstones with mild gallbladder wall thickening and positive sonographic Avalos's sign. Correlate clinically for possible early or mild acute calculous cholecystitis. Follow-up hepatobiliary scan could be performed as clinically indicated.  2. No other abnormalities.    Electronically signed by:  Satish Vargas MD  1/16/2023 7:55 AM [facility] Workstation: [id number]                                    ASSESSMENT & PLAN:   Bailey Joseph is a 67 y.o. female admitted for    Active Hospital Problems    Diagnosis  POA    *Acute cholecystitis [K81.0]  Yes    Pre-diabetes [R73.03]  Yes      Resolved Hospital Problems   No resolved problems to display.        Plan    Acute cholecystitis   - zosyn  - pt now s/p cholecystectomy  - clears  - general surgery following, thank you    Chronic conditions as noted above/below; home medications reviewed personally by me and restarted as appropriate  Electrolyte derangement:  Trending BMP; Mg; replacement prn  DVT ppx: lovenox held 2/2 surgery  FULL CODE      - The above conditions include an acute and/or chronic illness that poses a threat to life (or bodily function).  - Previous notes/encounters/external records reviewed personally by me.  - Pt's case personally discussed with another physician:  ER physician     Rose Kebede MD  [facility] Hospitalist  01/16/2023

## 2023-01-16 NOTE — BRIEF OP NOTE
[facility]  Brief Operative Note    SUMMARY     Surgery Date: 1/16/2023     Surgeon(s) and Role:     * Jean-Claude Salazar III, MD - Primary    Assisting Surgeon: None    Pre-op Diagnosis:  Acute cholecystitis [K81.0]    Post-op Diagnosis:  Post-Op Diagnosis Codes:     * Acute calculous cholecystitis    Procedure(s) (LRB):  CHOLECYSTECTOMY, LAPAROSCOPIC (N/A)    Anesthesia: General    Operative Findings: The patient was brought to the operating room and transferred to the operating room table in the supine position.  Patient was given general anesthesia and intubated.  The abdomen was prepped and draped.  A transverse infraumbilical incision was made.  Dissection was carried down through the skin and subcutaneous tissue.  The abdomen was insufflated with the Veress needle. The abdomen was entered with the 5 mm visiport. Under direct visualization, three ports were placed.  One 12 mm was placed in the subxiphoid position, one 5 mm in the right anterior axillary line and the other 5 mm in the mid clavicular line.  The gallbladder body was retracted superiorly and the infundibulum was retracted laterally. It was very distended. The peritoneum overlying the cystic duct and artery was carefully taken down.  The cystic duct and cystic artery were individually isolated and dissected free 360 degrees.  They were individually clipped proximally and distally.  They were transected using endo destiney.  Electrocautery was used to take the gallbladder off the liver bed.  The Endo-Catch bag was used to remove the gallbladder from the abdomen.  Gallbladder was filled with stones. We elongated the extraction site for another cm or so. We irrigated out the right upper quadrant with irrigation.  We checked again and there was no evidence of any bile leak or bleeding from our clips or from the liver bed. The patient tolerated the procedure well. Subxiphoid fascia was closed with interrupted Ethibond. We removed all of our  ports. We repaired the skin with 4-0 Monocryl.  After that was done, the patient was extubated and taken to the recovery room in stable condition. All lap and instrument counts were correct.     Estimated Blood Loss: 10 mL  Complications none.  Instrument counts correct.  Estimated Blood Loss has been documented.         Specimens:   Specimen (24h ago, onward)       Start     Ordered    01/16/23 1416  Specimen to Pathology - Surgery  Once        Comments: Pre-op Diagnosis: Acute cholecystitis [K81.0]Procedure(s):CHOLECYSTECTOMY, LAPAROSCOPIC Number of specimens: 1Name of specimens: gallbladder     Question:  Release to patient  Answer:  Immediate    01/16/23 1416    Signed and Held  Specimen to Pathology - Surgery  Once        Comments: Pre-op Diagnosis: Acute cholecystitis [K81.0]Procedure(s):CHOLECYSTECTOMY, LAPAROSCOPIC Number of specimens: 1Name of specimens: gallbladder     Question:  Release to patient  Answer:  Immediate    Signed and Held                    [id number]

## 2023-01-16 NOTE — TRANSFER OF CARE
"Anesthesia Transfer of Care Note    Patient: Bailey Joseph    Procedure(s) Performed: Procedure(s) (LRB):  CHOLECYSTECTOMY, LAPAROSCOPIC (N/A)    Patient location: PACU    Anesthesia Type: general    Transport from OR: Transported from OR on room air with adequate spontaneous ventilation    Post pain: adequate analgesia    Post assessment: no apparent anesthetic complications    Post vital signs: stable    Level of consciousness: awake    Nausea/Vomiting: no nausea/vomiting    Complications: none    Transfer of care protocol was followedComments: Patient stable, report to RN, questions answered.  I am available during the recovery time for any further needs       Last vitals:   Visit Vitals  BP (!) 141/65   Pulse (!) 48   Temp 36.7 °C (98.1 °F) (Oral)   Resp 18   Ht 5' 8" (1.727 m)   Wt 85.7 kg (189 lb)   SpO2 100%   BMI 28.74 kg/m²     "

## 2023-01-16 NOTE — ED NOTES
Alert and oriented. Now 4/10 abdominal pain. Non labored respirations. Call light in reach. Non distended abdomen.

## 2023-01-16 NOTE — ANESTHESIA POSTPROCEDURE EVALUATION
Anesthesia Post Evaluation    Patient: Bailey Joseph    Procedure(s) Performed: Procedure(s) (LRB):  CHOLECYSTECTOMY, LAPAROSCOPIC (N/A)    Final Anesthesia Type: general      Patient location during evaluation: PACU  Patient participation: Yes- Able to Participate  Level of consciousness: awake and alert and oriented  Post-procedure vital signs: reviewed and stable  Pain management: adequate  Airway patency: patent    PONV status at discharge: No PONV  Anesthetic complications: no      Cardiovascular status: blood pressure returned to baseline and hemodynamically stable  Respiratory status: unassisted, spontaneous ventilation and nasal cannula  Hydration status: euvolemic  Follow-up not needed.          Vitals Value Taken Time   /66 01/16/23 1545   Temp 36.6 01/16/23 1547   Pulse 52 01/16/23 1545   Resp 16 01/16/23 1530   SpO2 98 % 01/16/23 1545   Vitals shown include unvalidated device data.      No case tracking events are documented in the log.      Pain/Lynne Score: Pain Rating Prior to Med Admin: 10 (1/16/2023  3:25 PM)  Pain Rating Post Med Admin: 6 (1/16/2023  9:25 AM)  Lynne Score: 10 (1/16/2023  3:30 PM)

## 2023-01-16 NOTE — CONSULTS
Former[facility], [facility] - Emergency Dept  General Surgery  Consult Note    Inpatient consult to General surgery  Consult performed by: Jean-Claude Salazar III, MD  Consult ordered by: Rose Kebede MD  Reason for consult: Acute cholecystitis      Subjective:     Chief Complaint/Reason for Admission:  Acute cholecystitis    History of Present Illness:  Patient is 67-year-old female admitted this morning with acute cholecystitis.  She presented with 1 day history of severe right upper quadrant abdominal pain.  Pain associated with nausea.  Pain is constant, severe.  Denies any fevers or chills.  Liver function tests within normal limits.  White count within normal limits.  Ultrasound showed gallstones with gallbladder wall thickening.  Common bile duct normal in caliber.  She had positive sonographic Avalos sign.  She is hemodynamically stable.  She is afebrile.  She reports no previous abdominal surgical history.    No current facility-administered medications on file prior to encounter.     Current Outpatient Medications on File Prior to Encounter   Medication Sig    ferrous sulfate, dried (SLOW FE) 160 mg (50 mg iron) TbSR Take 160 mg by mouth once daily.       Review of patient's allergies indicates:  No Known Allergies    Past Medical History:   Diagnosis Date    Diabetes mellitus, type 2     Thyroid disease      Past Surgical History:   Procedure Laterality Date    BREAST BIOPSY      LIPOMA RESECTION      TONSILLECTOMY       Family History       Problem Relation (Age of Onset)    Diabetes Mother, Sister, Brother          Tobacco Use    Smoking status: Never    Smokeless tobacco: Never   Substance and Sexual Activity    Alcohol use: Yes    Drug use: No    Sexual activity: Yes     Review of Systems   Gastrointestinal:  Positive for abdominal pain and nausea.   Objective:     Vital Signs (Most Recent):  Temp: 98.1 °F (36.7 °C) (01/16/23 0512)  Pulse: (!) 49 (01/16/23 0938)  Resp: 18 (01/16/23 1049)  BP: (!)  156/70 (01/16/23 0930)  SpO2: 99 % (01/16/23 0934)   Vital Signs (24h Range):  Temp:  [98.1 °F (36.7 °C)] 98.1 °F (36.7 °C)  Pulse:  [47-50] 49  Resp:  [14-22] 18  SpO2:  [98 %-99 %] 99 %  BP: (151-157)/(68-83) 156/70     Weight: 85.7 kg (189 lb)  Body mass index is 28.74 kg/m².    No intake or output data in the 24 hours ending 01/16/23 1126    Physical Exam  Constitutional:       General: She is awake. She is not in acute distress.     Appearance: Normal appearance. She is not toxic-appearing.   HENT:      Head: Normocephalic and atraumatic.   Pulmonary:      Effort: Pulmonary effort is normal. No tachypnea, bradypnea, accessory muscle usage or respiratory distress.   Abdominal:      General: There is no distension.      Palpations: Abdomen is soft.      Tenderness: There is abdominal tenderness in the right upper quadrant.   Musculoskeletal:      Cervical back: Neck supple.   Skin:     Coloration: Skin is not jaundiced.   Neurological:      Mental Status: She is alert and oriented to person, place, and time.   Psychiatric:         Behavior: Behavior is cooperative.       Significant Labs:  CBC:   Recent Labs   Lab 01/16/23  0527   WBC 5.53   RBC 4.08   HGB 12.1   HCT 36.5*      MCV 90   MCH 29.7   MCHC 33.2     CMP:   Recent Labs   Lab 01/16/23  0527   *   CALCIUM 9.3   ALBUMIN 4.1   PROT 8.5*      K 3.6   CO2 24      BUN 16   CREATININE 0.9   ALKPHOS 59   ALT 26   AST 29   BILITOT 0.5       Significant Diagnostics:  I have reviewed all pertinent imaging results/findings within the past 24 hours.    Assessment/Plan:     Active Diagnoses:    Diagnosis Date Noted POA    PRINCIPAL PROBLEM:  Acute cholecystitis [K81.0] 01/16/2023 Yes    Pre-diabetes [R73.03] 05/04/2021 Yes      Problems Resolved During this Admission:   Patient admitted and started on antibiotics.  Will go the operating room for cholecystectomy today.  Risks and benefits of the surgery discussed with the patient.    Thank  you for your consult.     Jean-Claude Salazar III, MD  General Surgery  [facility] - Emergency Dept

## 2023-01-16 NOTE — ED PROVIDER NOTES
Encounter Date: 1/16/2023       History     Chief Complaint   Patient presents with    Abdominal Pain     Sharp upper abd pain x 5 hrs    Vomiting     Patient presents complaining of abdominal pain and discomfort.  Patient started with right-sided epigastric abdominal pain that has been intermittent for the last few days but worse overnight.  Patient had a few episodes of vomiting.  At the worst symptoms are mild-to-moderate.  Nothing really makes it better or worse.  She is not had this before.    Review of patient's allergies indicates:  No Known Allergies  Past Medical History:   Diagnosis Date    Diabetes mellitus, type 2     Thyroid disease      Past Surgical History:   Procedure Laterality Date    BREAST BIOPSY      LIPOMA RESECTION      TONSILLECTOMY       Family History   Problem Relation Age of Onset    Diabetes Mother     Diabetes Sister     Diabetes Brother      Social History     Tobacco Use    Smoking status: Never    Smokeless tobacco: Never   Substance Use Topics    Alcohol use: Yes    Drug use: No     Review of Systems   All other systems reviewed and are negative.    Physical Exam     Initial Vitals [01/16/23 0512]   BP Pulse Resp Temp SpO2   (!) 151/83 (!) 48 18 98.1 °F (36.7 °C) 98 %      MAP       --         Physical Exam    Nursing note and vitals reviewed.  Constitutional: She appears well-developed and well-nourished.   Pleasant, polite   HENT:   Head: Normocephalic and atraumatic.   Eyes: EOM are normal.   Neck: Neck supple.   Normal range of motion.  Cardiovascular:  Normal rate, regular rhythm, normal heart sounds and intact distal pulses.           Pulmonary/Chest: Breath sounds normal. No respiratory distress.   Abdominal: There is abdominal tenderness.   Musculoskeletal:         General: Normal range of motion.      Cervical back: Normal range of motion and neck supple.     Neurological: She is alert and oriented to person, place, and time.   Skin: Skin is warm and dry. Capillary refill  takes less than 2 seconds.   Psychiatric: She has a normal mood and affect. Her behavior is normal. Judgment and thought content normal.       ED Course   Procedures  Labs Reviewed   CBC W/ AUTO DIFFERENTIAL - Abnormal; Notable for the following components:       Result Value    Hematocrit 36.5 (*)     All other components within normal limits    Narrative:     For upper or mid abdominal pain.   COMPREHENSIVE METABOLIC PANEL - Abnormal; Notable for the following components:    Glucose 141 (*)     Total Protein 8.5 (*)     All other components within normal limits    Narrative:     For upper or mid abdominal pain.   LIPASE    Narrative:     For upper or mid abdominal pain.   URINALYSIS, REFLEX TO URINE CULTURE          Imaging Results              US Abdomen Limited (Final result)  Result time 01/16/23 07:55:59      Final result by Satish Vargas MD (01/16/23 07:55:59)                   Narrative:    Abdominal ultrasound Limited    CLINICAL DATA: Right upper quadrant pain    FINDINGS: Sonographic assessment targeted to the right upper quadrant was performed.    Visualized portions of the pancreas, aorta, and inferior vena cava are unremarkable.    The liver is normal in appearance. Hepatopedal flow is noted within the portal vein. Common bile duct is normal in caliber at 2 mm. Multiple gallstones are noted. The portions of the gallbladder wall are mildly thickened at up to 4 mm. No significant pericholecystic fluid is identified. Technologist reports tenderness directly over the gallbladder during the exam.    IMPRESSION:  1. Gallstones with mild gallbladder wall thickening and positive sonographic Avalos's sign. Correlate clinically for possible early or mild acute calculous cholecystitis. Follow-up hepatobiliary scan could be performed as clinically indicated.  2. No other abnormalities.    Electronically signed by:  Satish Vargas MD  1/16/2023 7:55 AM [facility] Workstation: [id number]                                      Medications   piperacillin-tazobactam 4.5 g in dextrose 5 % 100 mL IVPB (ready to mix system) (has no administration in time range)   morphine injection 4 mg (4 mg Intravenous Given 1/16/23 0639)   ondansetron injection 4 mg (4 mg Intravenous Given 1/16/23 0639)   0.9%  NaCl infusion (1,000 mLs Intravenous New Bag 1/16/23 0639)     Medical Decision Making:   Initial Assessment:   No apparent distress  Differential Diagnosis:   Considerations include but are not limited to acute cholecystitis, biliary colic, acute pancreatitis, colitis, gastritis  Independently Interpreted Test(s):   I have ordered and independently interpreted EKG Reading(s) - see summary below       <> Summary of EKG Reading(s): EKG is sinus bradycardia rate and regular rhythm without ST elevation  Clinical Tests:   Lab Tests: Reviewed and Ordered  Radiological Study: Ordered and Reviewed  Medical Tests: Reviewed and Ordered  ED Management:  MDM    Patient presents for emergent evaluation of acute abdominal pain that poses a threat to life and/or bodily function.    In the ED patient found to have acute acute cholecystitis.    I ordered labs and personally reviewed them.  Labs significant for white blood cell count of 5, normal liver function    I ordered EKG and personally reviewed it.  EKG significant for sinus bradycardia.    I ordered ultrasound scan and personally reviewed it and reviewed the radiologist interpretation.  Ultrasound significant for acute cholecystitis.      Admission [facility]  I discussed the patient presentation labs, ekg, X-rays, CT findings with the consultant for General surgery Dr. Reyes (speciality).    Patient was managed in the ED with IV Zosyn.    The response to treatment was improved.    Patient required emergent consultation to hospitalist (admitting physician) for admission.           ED Course as of 01/16/23 0827   Mon Jan 16, 2023   0812 WBC: 5.53 [AP]      ED Course User Index  [AP] Vijay Luevano MD                  Clinical Impression:   Final diagnoses:  [R00.1] Bradycardia  [K81.0] Acute cholecystitis (Primary)        ED Disposition Condition    Admit Stable                Vijay Luevano MD  01/16/23 2065

## 2023-01-17 LAB
ALBUMIN SERPL BCP-MCNC: 2.9 G/DL (ref 3.5–5.2)
ALP SERPL-CCNC: 42 U/L (ref 55–135)
ALT SERPL W/O P-5'-P-CCNC: 54 U/L (ref 10–44)
ANION GAP SERPL CALC-SCNC: 6 MMOL/L (ref 8–16)
AST SERPL-CCNC: 60 U/L (ref 10–40)
BASOPHILS # BLD AUTO: 0.02 K/UL (ref 0–0.2)
BASOPHILS NFR BLD: 0.2 % (ref 0–1.9)
BILIRUB SERPL-MCNC: 0.3 MG/DL (ref 0.1–1)
BUN SERPL-MCNC: 12 MG/DL (ref 8–23)
CALCIUM SERPL-MCNC: 7.8 MG/DL (ref 8.7–10.5)
CHLORIDE SERPL-SCNC: 106 MMOL/L (ref 95–110)
CO2 SERPL-SCNC: 24 MMOL/L (ref 23–29)
CREAT SERPL-MCNC: 0.8 MG/DL (ref 0.5–1.4)
DIFFERENTIAL METHOD: ABNORMAL
EOSINOPHIL # BLD AUTO: 0 K/UL (ref 0–0.5)
EOSINOPHIL NFR BLD: 0 % (ref 0–8)
ERYTHROCYTE [DISTWIDTH] IN BLOOD BY AUTOMATED COUNT: 14.6 % (ref 11.5–14.5)
EST. GFR  (NO RACE VARIABLE): >60 ML/MIN/1.73 M^2
GLUCOSE SERPL-MCNC: 114 MG/DL (ref 70–110)
HCT VFR BLD AUTO: 31.6 % (ref 37–48.5)
HGB BLD-MCNC: 10 G/DL (ref 12–16)
IMM GRANULOCYTES # BLD AUTO: 0.04 K/UL (ref 0–0.04)
IMM GRANULOCYTES NFR BLD AUTO: 0.4 % (ref 0–0.5)
LYMPHOCYTES # BLD AUTO: 1.7 K/UL (ref 1–4.8)
LYMPHOCYTES NFR BLD: 19.1 % (ref 18–48)
MAGNESIUM SERPL-MCNC: 1.6 MG/DL (ref 1.6–2.6)
MCH RBC QN AUTO: 29.7 PG (ref 27–31)
MCHC RBC AUTO-ENTMCNC: 31.6 G/DL (ref 32–36)
MCV RBC AUTO: 94 FL (ref 82–98)
MONOCYTES # BLD AUTO: 0.7 K/UL (ref 0.3–1)
MONOCYTES NFR BLD: 8 % (ref 4–15)
NEUTROPHILS # BLD AUTO: 6.6 K/UL (ref 1.8–7.7)
NEUTROPHILS NFR BLD: 72.3 % (ref 38–73)
NRBC BLD-RTO: 0 /100 WBC
PLATELET # BLD AUTO: 236 K/UL (ref 150–450)
PMV BLD AUTO: 10 FL (ref 9.2–12.9)
POTASSIUM SERPL-SCNC: 3.8 MMOL/L (ref 3.5–5.1)
PROT SERPL-MCNC: 6.8 G/DL (ref 6–8.4)
RBC # BLD AUTO: 3.37 M/UL (ref 4–5.4)
SODIUM SERPL-SCNC: 136 MMOL/L (ref 136–145)
WBC # BLD AUTO: 9.07 K/UL (ref 3.9–12.7)

## 2023-01-17 PROCEDURE — 80053 COMPREHEN METABOLIC PANEL: CPT | Performed by: FAMILY MEDICINE

## 2023-01-17 PROCEDURE — G0378 HOSPITAL OBSERVATION PER HR: HCPCS

## 2023-01-17 PROCEDURE — 63600175 PHARM REV CODE 636 W HCPCS: Performed by: SURGERY

## 2023-01-17 PROCEDURE — 83735 ASSAY OF MAGNESIUM: CPT | Performed by: SURGERY

## 2023-01-17 PROCEDURE — 96366 THER/PROPH/DIAG IV INF ADDON: CPT | Mod: 59

## 2023-01-17 PROCEDURE — 25000003 PHARM REV CODE 250: Performed by: SURGERY

## 2023-01-17 PROCEDURE — 85025 COMPLETE CBC W/AUTO DIFF WBC: CPT | Performed by: SURGERY

## 2023-01-17 PROCEDURE — 36415 COLL VENOUS BLD VENIPUNCTURE: CPT | Performed by: SURGERY

## 2023-01-17 PROCEDURE — 96376 TX/PRO/DX INJ SAME DRUG ADON: CPT

## 2023-01-17 RX ADMIN — ONDANSETRON 4 MG: 2 INJECTION INTRAMUSCULAR; INTRAVENOUS at 11:01

## 2023-01-17 RX ADMIN — HYDROCODONE BITARTRATE AND ACETAMINOPHEN 1 TABLET: 5; 325 TABLET ORAL at 07:01

## 2023-01-17 RX ADMIN — PIPERACILLIN SODIUM AND TAZOBACTAM SODIUM 3.38 G: 3; .375 INJECTION, POWDER, LYOPHILIZED, FOR SOLUTION INTRAVENOUS at 01:01

## 2023-01-17 RX ADMIN — MUPIROCIN 1 G: 20 OINTMENT TOPICAL at 09:01

## 2023-01-17 RX ADMIN — ACETAMINOPHEN 650 MG: 325 TABLET ORAL at 01:01

## 2023-01-17 RX ADMIN — HYDROCODONE BITARTRATE AND ACETAMINOPHEN 1 TABLET: 5; 325 TABLET ORAL at 06:01

## 2023-01-17 RX ADMIN — FERROUS SULFATE TAB 325 MG (65 MG ELEMENTAL FE) 1 EACH: 325 (65 FE) TAB at 09:01

## 2023-01-17 RX ADMIN — MORPHINE SULFATE 4 MG: 4 INJECTION, SOLUTION INTRAMUSCULAR; INTRAVENOUS at 02:01

## 2023-01-17 RX ADMIN — HYDROCODONE BITARTRATE AND ACETAMINOPHEN 1 TABLET: 5; 325 TABLET ORAL at 11:01

## 2023-01-17 RX ADMIN — PIPERACILLIN SODIUM AND TAZOBACTAM SODIUM 3.38 G: 3; .375 INJECTION, POWDER, LYOPHILIZED, FOR SOLUTION INTRAVENOUS at 09:01

## 2023-01-17 NOTE — DISCHARGE SUMMARY
"[facility]  Discharge Summary  Patient Name: Bailey Joseph MRN: [id number]   Patient Class: OP- Observation  Length of Stay: 0   Admission Date: 1/16/2023  5:07 AM Attending Physician: Luis E Reddy MD   Primary Care Provider: Nilesh Joseph NP Face-to-Face encounter date: 01/17/2023   Chief Complaint: Abdominal Pain (Sharp upper abd pain x 5 hrs) and Vomiting    Date of Discharge: 1/17/2023  Discharge Disposition:  Home or Self Care  Condition: Stable       Reason for Hospitalization     Acute cholecystitis    Patient Active Problem List   Diagnosis    Paresthesias    Pre-diabetes    Iron deficiency    Acute cholecystitis       Brief History of Present Illness      Bailey Joseph is a 67 y.o. Patient Refused female   With PMH of DM2, thyroid dz,  who presents with abdominal pain.  Admitted for acute cholecystitis      Located RUQ without radiation  Onset 3 days ago  Progressively worsening  Severely worsened overnight  +nausea with vomiting  No subjective fever  No chills     Hospital Course By Problem with Pertinent Findings     Patient was admitted for acute cholecystitis.  She had a laparoscopic cholecystectomy yesterday 1/16 which went well without any complications noted.  She has been cleared for discharge by to surgery today.      Patient was seen and examined on the date of discharge and determined to be suitable for discharge.    Physical Exam  /77   Pulse 72   Temp 98.4 °F (36.9 °C)   Resp 19   Ht 5' 8" (1.727 m)   Wt 85.7 kg (189 lb)   SpO2 95%   Breastfeeding No   BMI 28.74 kg/m²   Vitals reviewed.    Constitutional: No distress.   HENT: Atraumatic.   Cardiovascular: Normal rate, regular rhythm and normal heart sounds.   Pulmonary/Chest: Effort normal. Clear to auscultation bilaterally. No wheezes.   Abdominal: Soft. Bowel sounds are normal. Exhibits no distension and no mass. No tenderness  Neurological: Alert.   Skin: Skin is warm and dry.     Following labs were " Reviewed   Recent Labs   Lab 01/17/23  0528   WBC 9.07   HGB 10.0*   HCT 31.6*      CALCIUM 7.8*   ALBUMIN 2.9*   PROT 6.8      K 3.8   CO2 24      BUN 12   CREATININE 0.8   ALKPHOS 42*   ALT 54*   AST 60*   BILITOT 0.3     No results found for: POCTGLUCOSE         Microbiology Results (last 7 days)       Procedure Component Value Units Date/Time    Blood Culture #1 **CANNOT BE ORDERED STAT** [[id number]] Collected: 01/16/23 0901    Order Status: Completed Specimen: Blood from Peripheral, Forearm, Left Updated: 01/17/23 1032     Blood Culture, Routine No Growth to date      No Growth to date    Blood culture [[id number]] Collected: 01/16/23 0935    Order Status: Completed Specimen: Blood from Peripheral, Forearm, Right Updated: 01/17/23 1032     Blood Culture, Routine No Growth to date      No Growth to date          US Abdomen Limited   Final Result          No results found for this or any previous visit.      Consultants and Procedures   Consultants:  Consults (From admission, onward)          Status Ordering Provider     Inpatient consult to General surgery  Once        Provider:  Devi Salazar III, MD    Completed ROSE SAM     Inpatient consult to Hospitalist  Once        Provider:  Rose Sam MD    Acknowledged DEVI SALAZAR III                Discharge Information:   Diet:  Regular diet as tolerated    Physical Activity:  Activity as tolerated    Instructions:  1. Take all medications as prescribed  2. Keep all follow-up appointments      Follow-Up Appointments:  Please call your primary care physician to schedule an appointment in 1 week time.            Pending laboratory work/Tests to be performed/followed by the Primary care Physician:    The patient was discharged in the care of her parents//wife/family/caregiver, with discharge instructions were reviewed in written and verbal form. All pertinent questions were discussed and prescriptions were provided.  The importance of making follow up appointments and compliance of medications has been stressed repeatedly. The patient will follow up in 1 week or sooner as needed with the PCP, and the patient is on board with the plan. Upon discharge, patient needs to be on following medications.    Discharge Medications:     Medication List        CONTINUE taking these medications      ferrous sulfate, dried 160 mg (50 mg iron) Tbsr  Commonly known as: SLOW FE                I spent 45 minutes preparing the discharge including reviewing records from previous encounters, preparation of discharge summary, assessing and final examination of the patient, discharge medicine reconciliation, discussing plan of care, follow up and education and prescriptions.       Luis E Lara  [facility] Hospitalist  01/17/2023

## 2023-01-17 NOTE — PLAN OF CARE
"[facility]  Initial Discharge Assessment       Primary Care Provider: Nilesh Joseph NP    Admission Diagnosis: Acute cholecystitis [K81.0]    Admission Date: 1/16/2023  Expected Discharge Date: 1/17/2023    Discharge Barriers Identified: None    Payor: HEALTHY BLUE MEDICARE ADVANTAGE / Plan: HEALTHY BLUE DUAL ADVANTAGE / Product Type: Medicare Advantage /     Extended Emergency Contact Information  Primary Emergency Contact: Osmar Manzano Jr. "Gary"  Address: [facility] [place] [address] [place]  Work Phone: [phone]  Mobile Phone: [phone]  Relation: Friend  Preferred language: English   needed? No    Discharge Plan A: Home  Discharge Plan B: Home      [facility] [address] [facility] [address] [facility]  [address] [place] [address]  Phone: [phone] Fax: [phone]      Initial Assessment (most recent)       Adult Discharge Assessment - 01/17/23 1534          Discharge Assessment    Assessment Type Discharge Planning Assessment     Confirmed/corrected address, phone number and insurance Yes     Confirmed Demographics Correct on Facesheet     Source of Information patient     Does patient/caregiver understand observation status Yes     Communicated ANGELO with patient/caregiver Date not available/Unable to determine     People in Home alone     Do you expect to return to your current living situation? Yes     Do you have help at home or someone to help you manage your care at home? Yes     Who are your caregiver(s) and their phone number(s)? Senia/Osmar [phone]     Prior to hospitilization cognitive status: Alert/Oriented     Current cognitive status: Alert/Oriented     Home Accessibility wheelchair accessible     Home Layout Able to live on 1st floor     Equipment Currently Used at Home none     Readmission within 30 days? No     Patient currently being followed by outpatient case management? " No     Do you currently have service(s) that help you manage your care at home? No     Do you take prescription medications? Yes     Do you have prescription coverage? Yes     Coverage [employer]     Do you have any problems affording any of your prescribed medications? No     Is the patient taking medications as prescribed? yes     Who is going to help you get home at discharge? Friend/Osmar [phone]     How do you get to doctors appointments? car, drives self     Are you on dialysis? No     Do you take coumadin? No     Discharge Plan A Home     Discharge Plan B Home     DME Needed Upon Discharge  none     Discharge Plan discussed with: Patient;Friend     Name(s) and Number(s) Friend/Osmar [phone]     Discharge Barriers Identified None

## 2023-01-17 NOTE — PLAN OF CARE
Patient cleared for discharge from case management standpoint.    Chart and discharge orders reviewed.  Patient discharged home with no further case management needs.       01/17/23 1619   Final Note   Assessment Type Final Discharge Note   Anticipated Discharge Disposition Home   What phone number can be called within the next 1-3 days to see how you are doing after discharge? [phone]   Post-Acute Status   Discharge Delays None known at this time

## 2023-01-18 VITALS
DIASTOLIC BLOOD PRESSURE: 72 MMHG | TEMPERATURE: 98 F | OXYGEN SATURATION: 94 % | WEIGHT: 189 LBS | HEIGHT: 68 IN | RESPIRATION RATE: 16 BRPM | SYSTOLIC BLOOD PRESSURE: 146 MMHG | BODY MASS INDEX: 28.64 KG/M2 | HEART RATE: 78 BPM

## 2023-01-18 LAB
ALBUMIN SERPL BCP-MCNC: 3.1 G/DL (ref 3.5–5.2)
ALP SERPL-CCNC: 50 U/L (ref 55–135)
ALT SERPL W/O P-5'-P-CCNC: 52 U/L (ref 10–44)
ANION GAP SERPL CALC-SCNC: 6 MMOL/L (ref 8–16)
AST SERPL-CCNC: 50 U/L (ref 10–40)
BASOPHILS # BLD AUTO: 0.03 K/UL (ref 0–0.2)
BASOPHILS NFR BLD: 0.4 % (ref 0–1.9)
BILIRUB SERPL-MCNC: 0.7 MG/DL (ref 0.1–1)
BUN SERPL-MCNC: 10 MG/DL (ref 8–23)
CALCIUM SERPL-MCNC: 8.2 MG/DL (ref 8.7–10.5)
CHLORIDE SERPL-SCNC: 107 MMOL/L (ref 95–110)
CO2 SERPL-SCNC: 25 MMOL/L (ref 23–29)
CREAT SERPL-MCNC: 0.9 MG/DL (ref 0.5–1.4)
DIFFERENTIAL METHOD: ABNORMAL
EOSINOPHIL # BLD AUTO: 0 K/UL (ref 0–0.5)
EOSINOPHIL NFR BLD: 0.4 % (ref 0–8)
ERYTHROCYTE [DISTWIDTH] IN BLOOD BY AUTOMATED COUNT: 14.6 % (ref 11.5–14.5)
EST. GFR  (NO RACE VARIABLE): >60 ML/MIN/1.73 M^2
GLUCOSE SERPL-MCNC: 91 MG/DL (ref 70–110)
HCT VFR BLD AUTO: 31.7 % (ref 37–48.5)
HGB BLD-MCNC: 10.2 G/DL (ref 12–16)
IMM GRANULOCYTES # BLD AUTO: 0.02 K/UL (ref 0–0.04)
IMM GRANULOCYTES NFR BLD AUTO: 0.3 % (ref 0–0.5)
LYMPHOCYTES # BLD AUTO: 2.4 K/UL (ref 1–4.8)
LYMPHOCYTES NFR BLD: 30.7 % (ref 18–48)
MAGNESIUM SERPL-MCNC: 1.9 MG/DL (ref 1.6–2.6)
MCH RBC QN AUTO: 30.1 PG (ref 27–31)
MCHC RBC AUTO-ENTMCNC: 32.2 G/DL (ref 32–36)
MCV RBC AUTO: 94 FL (ref 82–98)
MONOCYTES # BLD AUTO: 0.7 K/UL (ref 0.3–1)
MONOCYTES NFR BLD: 9.2 % (ref 4–15)
NEUTROPHILS # BLD AUTO: 4.5 K/UL (ref 1.8–7.7)
NEUTROPHILS NFR BLD: 59 % (ref 38–73)
NRBC BLD-RTO: 0 /100 WBC
PLATELET # BLD AUTO: 213 K/UL (ref 150–450)
PMV BLD AUTO: 10.3 FL (ref 9.2–12.9)
POTASSIUM SERPL-SCNC: 3.5 MMOL/L (ref 3.5–5.1)
PROT SERPL-MCNC: 6.6 G/DL (ref 6–8.4)
RBC # BLD AUTO: 3.39 M/UL (ref 4–5.4)
SODIUM SERPL-SCNC: 138 MMOL/L (ref 136–145)
WBC # BLD AUTO: 7.68 K/UL (ref 3.9–12.7)

## 2023-01-18 PROCEDURE — 36415 COLL VENOUS BLD VENIPUNCTURE: CPT | Performed by: SURGERY

## 2023-01-18 PROCEDURE — 94761 N-INVAS EAR/PLS OXIMETRY MLT: CPT | Mod: XB

## 2023-01-18 PROCEDURE — 94618 PULMONARY STRESS TESTING: CPT

## 2023-01-18 PROCEDURE — 25000003 PHARM REV CODE 250: Performed by: SURGERY

## 2023-01-18 PROCEDURE — 85025 COMPLETE CBC W/AUTO DIFF WBC: CPT | Performed by: SURGERY

## 2023-01-18 PROCEDURE — 99900031 HC PATIENT EDUCATION (STAT)

## 2023-01-18 PROCEDURE — 83735 ASSAY OF MAGNESIUM: CPT | Performed by: SURGERY

## 2023-01-18 PROCEDURE — 99900035 HC TECH TIME PER 15 MIN (STAT)

## 2023-01-18 PROCEDURE — 80053 COMPREHEN METABOLIC PANEL: CPT | Performed by: FAMILY MEDICINE

## 2023-01-18 PROCEDURE — 63600175 PHARM REV CODE 636 W HCPCS: Performed by: SURGERY

## 2023-01-18 PROCEDURE — G0378 HOSPITAL OBSERVATION PER HR: HCPCS

## 2023-01-18 PROCEDURE — 96376 TX/PRO/DX INJ SAME DRUG ADON: CPT

## 2023-01-18 RX ORDER — ONDANSETRON 4 MG/1
4 TABLET, FILM COATED ORAL EVERY 8 HOURS PRN
Qty: 30 TABLET | Refills: 0 | Status: SHIPPED | OUTPATIENT
Start: 2023-01-18 | End: 2023-02-06

## 2023-01-18 RX ORDER — HYDROCODONE BITARTRATE AND ACETAMINOPHEN 5; 325 MG/1; MG/1
1 TABLET ORAL EVERY 6 HOURS PRN
Qty: 25 TABLET | Refills: 0 | Status: SHIPPED | OUTPATIENT
Start: 2023-01-18 | End: 2023-02-06

## 2023-01-18 RX ADMIN — PIPERACILLIN SODIUM AND TAZOBACTAM SODIUM 3.38 G: 3; .375 INJECTION, POWDER, LYOPHILIZED, FOR SOLUTION INTRAVENOUS at 09:01

## 2023-01-18 RX ADMIN — MUPIROCIN 1 G: 20 OINTMENT TOPICAL at 09:01

## 2023-01-18 RX ADMIN — FERROUS SULFATE TAB 325 MG (65 MG ELEMENTAL FE) 1 EACH: 325 (65 FE) TAB at 09:01

## 2023-01-18 RX ADMIN — HYDROCODONE BITARTRATE AND ACETAMINOPHEN 1 TABLET: 5; 325 TABLET ORAL at 09:01

## 2023-01-18 RX ADMIN — HYDROCODONE BITARTRATE AND ACETAMINOPHEN 1 TABLET: 5; 325 TABLET ORAL at 05:01

## 2023-01-18 NOTE — PROGRESS NOTES
[facility]  General Surgery  Progress Note    Subjective:     Interval History: O2 saturation down today. Low as 85 with activity today. Mild Shortness of breath. She states she has hard time with deep breath due to incisional pain.  No fever or chills. No nausea.   LFT's OK  Chest xray within normal limit.     Post-Op Info:  Procedure(s) (LRB):  CHOLECYSTECTOMY, LAPAROSCOPIC (N/A)   1 Day Post-Op      Medications:  Continuous Infusions:   sodium chloride 0.9% 75 mL/hr at 01/16/23 1719     Scheduled Meds:   ferrous sulfate  1 tablet Oral Daily    mupirocin  1 g Nasal BID    piperacillin-tazobactam (ZOSYN) IVPB  3.375 g Intravenous Q8H     PRN Meds:acetaminophen, albuterol-ipratropium, dextrose 10%, dextrose 10%, glucagon (human recombinant), glucose, glucose, HYDROcodone-acetaminophen, melatonin, morphine, naloxone, ondansetron, polyethylene glycol, senna-docusate 8.6-50 mg, simethicone, sodium chloride 0.9%     Objective:     Vital Signs (Most Recent):  Temp: 98.5 °F (36.9 °C) (01/17/23 1933)  Pulse: 65 (01/17/23 1933)  Resp: 16 (01/1957)  BP: 122/62 (01/17/23 1933)  SpO2: 95 % (01/17/23 1933) Vital Signs (24h Range):  Temp:  [98.2 °F (36.8 °C)-99.6 °F (37.6 °C)] 98.5 °F (36.9 °C)  Pulse:  [65-79] 65  Resp:  [16-20] 16  SpO2:  [90 %-95 %] 95 %  BP: (106-122)/(6-77) 122/62     No intake or output data in the 24 hours ending 01/17/23 2038    Physical Exam  Pulmonary:      Effort: Pulmonary effort is normal. No respiratory distress.   Abdominal:      Comments: Appropriate tenderness    Neurological:      Mental Status: She is alert.       Significant Labs:  CBC:   Recent Labs   Lab 01/17/23  0528   WBC 9.07   RBC 3.37*   HGB 10.0*   HCT 31.6*      MCV 94   MCH 29.7   MCHC 31.6*     CMP:   Recent Labs   Lab 01/17/23  0528   *   CALCIUM 7.8*   ALBUMIN 2.9*   PROT 6.8      K 3.8   CO2 24      BUN 12   CREATININE 0.8   ALKPHOS 42*   ALT 54*   AST 60*   BILITOT 0.3        Significant Diagnostics:  I have reviewed all pertinent imaging results/findings within the past 24 hours.    Assessment/Plan:     Active Diagnoses:    Diagnosis Date Noted POA    PRINCIPAL PROBLEM:  Acute cholecystitis [K81.0] 01/16/2023 Yes    Pre-diabetes [R73.03] 05/04/2021 Yes      Problems Resolved During this Admission:   -hypoxic post op. Encouraged aggressive IS. Ambulation.   -wean O2.   -diet as tolerated.   -Will stay in house tonight.     Jean-Claude Salazar III, MD  General Surgery  Formerly [facility]

## 2023-01-18 NOTE — NURSING
0700: report received, in bed with no complaints  1145: removed saline lock, monitor at desk, went over all discharge instructions, family to contact dr hobbs regarding follow up, answered all questions, escorted out via wheelchair to private vehicle.

## 2023-01-18 NOTE — PROGRESS NOTES
Sats dropped on yesterday, Md on way to see patient and assess for dc, doing her IS, sats 91% when walked, down to 80 over night without oxygen while in bed. Should dc once MD assesses.

## 2023-01-18 NOTE — RESPIRATORY THERAPY
01/18/23 0931   Home Oxygen Qualification   $ Home O2 Qualification Pulmonary Stress Test/6 min walk;Tech time 15 minutes   Room Air SpO2 At Rest 94 %   Room Air SpO2 During Ambulation 91 %   SpO2 Post Ambulation 93 %   Post Ambulation Heart Rate 77 bpm   Home O2 Eval Comments patient does not need oxygen. patient did not drop below 91%

## 2023-01-18 NOTE — PLAN OF CARE
01/18/23 0930   Patient Assessment/Suction   Level of Consciousness (AVPU) alert   Respiratory Effort Normal;Unlabored   Expansion/Accessory Muscles/Retractions no use of accessory muscles;no retractions   All Lung Fields Breath Sounds clear   PRE-TX-O2   Device (Oxygen Therapy) room air   SpO2 (!) 94 %   Pulse Oximetry Type Intermittent   $ Pulse Oximetry - Multiple Charge Pulse Oximetry - Multiple   Pulse 78   Resp 16   Aerosol Therapy   $ Aerosol Therapy Charges PRN treatment not required   Education   $ Education Bronchodilator;15 min

## 2023-01-21 LAB
BACTERIA BLD CULT: NORMAL
BACTERIA BLD CULT: NORMAL

## 2023-01-28 ENCOUNTER — PATIENT MESSAGE (OUTPATIENT)
Dept: SURGERY | Facility: CLINIC | Age: 68
End: 2023-01-28
Payer: COMMERCIAL

## 2023-02-02 ENCOUNTER — OFFICE VISIT (OUTPATIENT)
Dept: FAMILY MEDICINE | Facility: CLINIC | Age: 68
End: 2023-02-02
Payer: COMMERCIAL

## 2023-02-02 VITALS
TEMPERATURE: 98 F | HEART RATE: 76 BPM | DIASTOLIC BLOOD PRESSURE: 72 MMHG | OXYGEN SATURATION: 97 % | HEIGHT: 68 IN | SYSTOLIC BLOOD PRESSURE: 118 MMHG | WEIGHT: 204.81 LBS | BODY MASS INDEX: 31.04 KG/M2

## 2023-02-02 DIAGNOSIS — F41.9 ANXIETY: ICD-10-CM

## 2023-02-02 DIAGNOSIS — G47.9 SLEEP DISTURBANCE: ICD-10-CM

## 2023-02-02 DIAGNOSIS — R73.03 PRE-DIABETES: ICD-10-CM

## 2023-02-02 DIAGNOSIS — Z00.00 ANNUAL PHYSICAL EXAM: Primary | ICD-10-CM

## 2023-02-02 DIAGNOSIS — Z12.11 COLON CANCER SCREENING: ICD-10-CM

## 2023-02-02 DIAGNOSIS — E66.9 OBESITY (BMI 30.0-34.9): ICD-10-CM

## 2023-02-02 DIAGNOSIS — E61.1 IRON DEFICIENCY: ICD-10-CM

## 2023-02-02 DIAGNOSIS — G47.10 DAYTIME HYPERSOMNIA: ICD-10-CM

## 2023-02-02 DIAGNOSIS — Z13.828 ENCOUNTER FOR IMAGING TO ASSESS OSTEOPENIA: ICD-10-CM

## 2023-02-02 DIAGNOSIS — Z90.49 S/P CHOLECYSTECTOMY: ICD-10-CM

## 2023-02-02 DIAGNOSIS — D22.9 CHANGE IN SKIN MOLE: ICD-10-CM

## 2023-02-02 DIAGNOSIS — R06.83 HABITUAL SNORING: ICD-10-CM

## 2023-02-02 PROCEDURE — 99214 OFFICE O/P EST MOD 30 MIN: CPT | Mod: S$GLB,,, | Performed by: NURSE PRACTITIONER

## 2023-02-02 PROCEDURE — 99214 PR OFFICE/OUTPT VISIT, EST, LEVL IV, 30-39 MIN: ICD-10-PCS | Mod: S$GLB,,, | Performed by: NURSE PRACTITIONER

## 2023-02-02 PROCEDURE — 3078F PR MOST RECENT DIASTOLIC BLOOD PRESSURE < 80 MM HG: ICD-10-PCS | Mod: CPTII,S$GLB,, | Performed by: NURSE PRACTITIONER

## 2023-02-02 PROCEDURE — 3288F FALL RISK ASSESSMENT DOCD: CPT | Mod: CPTII,S$GLB,, | Performed by: NURSE PRACTITIONER

## 2023-02-02 PROCEDURE — 3074F SYST BP LT 130 MM HG: CPT | Mod: CPTII,S$GLB,, | Performed by: NURSE PRACTITIONER

## 2023-02-02 PROCEDURE — 1101F PR PT FALLS ASSESS DOC 0-1 FALLS W/OUT INJ PAST YR: ICD-10-PCS | Mod: CPTII,S$GLB,, | Performed by: NURSE PRACTITIONER

## 2023-02-02 PROCEDURE — 3008F PR BODY MASS INDEX (BMI) DOCUMENTED: ICD-10-PCS | Mod: CPTII,S$GLB,, | Performed by: NURSE PRACTITIONER

## 2023-02-02 PROCEDURE — 99999 PR PBB SHADOW E&M-EST. PATIENT-LVL V: ICD-10-PCS | Mod: PBBFAC,,, | Performed by: NURSE PRACTITIONER

## 2023-02-02 PROCEDURE — 1159F PR MEDICATION LIST DOCUMENTED IN MEDICAL RECORD: ICD-10-PCS | Mod: CPTII,S$GLB,, | Performed by: NURSE PRACTITIONER

## 2023-02-02 PROCEDURE — 3008F BODY MASS INDEX DOCD: CPT | Mod: CPTII,S$GLB,, | Performed by: NURSE PRACTITIONER

## 2023-02-02 PROCEDURE — 1159F MED LIST DOCD IN RCRD: CPT | Mod: CPTII,S$GLB,, | Performed by: NURSE PRACTITIONER

## 2023-02-02 PROCEDURE — 99999 PR PBB SHADOW E&M-EST. PATIENT-LVL V: CPT | Mod: PBBFAC,,, | Performed by: NURSE PRACTITIONER

## 2023-02-02 PROCEDURE — 1126F PR PAIN SEVERITY QUANTIFIED, NO PAIN PRESENT: ICD-10-PCS | Mod: CPTII,S$GLB,, | Performed by: NURSE PRACTITIONER

## 2023-02-02 PROCEDURE — 3288F PR FALLS RISK ASSESSMENT DOCUMENTED: ICD-10-PCS | Mod: CPTII,S$GLB,, | Performed by: NURSE PRACTITIONER

## 2023-02-02 PROCEDURE — 1160F PR REVIEW ALL MEDS BY PRESCRIBER/CLIN PHARMACIST DOCUMENTED: ICD-10-PCS | Mod: CPTII,S$GLB,, | Performed by: NURSE PRACTITIONER

## 2023-02-02 PROCEDURE — 1101F PT FALLS ASSESS-DOCD LE1/YR: CPT | Mod: CPTII,S$GLB,, | Performed by: NURSE PRACTITIONER

## 2023-02-02 PROCEDURE — 1160F RVW MEDS BY RX/DR IN RCRD: CPT | Mod: CPTII,S$GLB,, | Performed by: NURSE PRACTITIONER

## 2023-02-02 PROCEDURE — 1126F AMNT PAIN NOTED NONE PRSNT: CPT | Mod: CPTII,S$GLB,, | Performed by: NURSE PRACTITIONER

## 2023-02-02 PROCEDURE — 3078F DIAST BP <80 MM HG: CPT | Mod: CPTII,S$GLB,, | Performed by: NURSE PRACTITIONER

## 2023-02-02 PROCEDURE — 3074F PR MOST RECENT SYSTOLIC BLOOD PRESSURE < 130 MM HG: ICD-10-PCS | Mod: CPTII,S$GLB,, | Performed by: NURSE PRACTITIONER

## 2023-02-02 NOTE — PATIENT INSTRUCTIONS
Ned Avalos,     If you are due for any health screening(s) below please notify me so we can arrange them to be ordered and scheduled to maintain your health. Most healthy patients complete it. Don't lose out on improving your health.     Tests to Keep You Healthy    Mammogram: Met on 12/7/2022  Colon Cancer Screening: ORDERED BUT NOT SCHEDULED         Colon Cancer Screening    Of cancers affecting both men and women, colorectal cancer is the third leading cancer killer in the [place]. But it doesnt have to be. Screening can prevent colorectal cancer or find it at an early stage when treatment often leads to a cure.    A colonoscopy is the preferred test for detecting colon cancer. It is needed only once every 10 years if results are negative. While sedated, a flexible, lighted tube with a tiny camera is inserted into the rectum and advanced through the colon to look for cancers. An alternative screening test that is used at home and returned to the lab may also be used. It detects hidden blood in bowel movements which could indicate cancer in the colon. If results are positive, you will need a colonoscopy to determine if the blood is a sign of cancer. This type of follow up (diagnostic) colonoscopy usually requires additional copays as required by your insurance provider. Please contact your PCP if you have any questions.    Although you are still overdue for this important screening, due to the COVID-19 pandemic, we recommend scheduling it in the near future.

## 2023-02-02 NOTE — PROGRESS NOTES
"Subjective:       Patient ID: Bailey Joseph is a 67 y.o. female.    Chief Complaint: Mole    HPI    Patient presents today for annual visit. Labs reviewed 1/23. She is due for labs. Decline vaccines at this time. No prescription medication.      1/17/23 hospital stay for acute cholecystitis s/p  laparoscopic cholecystectomy     7/8/22 Podiatry-[facility]: plantar fascitis  Past Medical History:   Diagnosis Date    Diabetes mellitus, type 2     Thyroid disease        Review of patient's allergies indicates:  No Known Allergies      Current Outpatient Medications:     ferrous sulfate, dried (SLOW FE) 160 mg (50 mg iron) TbSR, Take 160 mg by mouth once daily., Disp: , Rfl:     HYDROcodone-acetaminophen (NORCO) 5-325 mg per tablet, Take 1 tablet by mouth every 6 (six) hours as needed for Pain. (Patient not taking: Reported on 2/2/2023), Disp: 25 tablet, Rfl: 0    ondansetron (ZOFRAN) 4 MG tablet, Take 1 tablet (4 mg total) by mouth every 8 (eight) hours as needed for Nausea. (Patient not taking: Reported on 2/2/2023), Disp: 30 tablet, Rfl: 0    Review of Systems   Constitutional:  Negative for unexpected weight change.   HENT:  Negative for trouble swallowing.    Eyes:  Negative for visual disturbance.   Respiratory:  Negative for shortness of breath.    Cardiovascular:  Negative for chest pain, palpitations and leg swelling.   Gastrointestinal:  Negative for blood in stool.   Genitourinary:  Negative for hematuria.   Skin:  Negative for rash.   Allergic/Immunologic: Negative for immunocompromised state.   Neurological:  Negative for headaches.   Hematological:  Does not bruise/bleed easily.   Psychiatric/Behavioral:  Negative for agitation. The patient is not nervous/anxious.      Objective:      /72 (BP Location: Right arm, Patient Position: Sitting, BP Method: Small (Manual))   Pulse 76   Temp 97.9 °F (36.6 °C) (Oral)   Ht 5' 8" (1.727 m)   Wt 92.9 kg (204 lb 12.9 oz)   SpO2 97%   BMI 31.14 kg/m²   Physical " Exam  Constitutional:       Appearance: She is well-developed.   HENT:      Head: Normocephalic.   Cardiovascular:      Rate and Rhythm: Normal rate and regular rhythm.      Heart sounds: Normal heart sounds.   Pulmonary:      Effort: Pulmonary effort is normal.   Abdominal:          Comments: Healing incision   Musculoskeletal:         General: Normal range of motion.   Skin:     General: Skin is warm and dry.   Neurological:      Mental Status: She is alert and oriented to person, place, and time.   Psychiatric:         Behavior: Behavior normal.         Thought Content: Thought content normal.         Judgment: Judgment normal.       Assessment:       1. Annual physical exam    2. Change in skin mole    3. S/P cholecystectomy    4. Colon cancer screening    5. Sleep disturbance    6. Habitual snoring    7. Daytime hypersomnia    8. Anxiety    9. Pre-diabetes    10. Iron deficiency    11. Encounter for imaging to assess osteopenia    12. Obesity (BMI 30.0-34.9)        Plan:       Annual physical exam  -     COMPREHENSIVE METABOLIC PANEL; Future; Expected date: 02/02/2023  -     Hemoglobin A1C; Future; Expected date: 02/02/2023  -     Lipid Panel; Future; Expected date: 02/02/2023  -     TSH; Future; Expected date: 02/02/2023    Change in skin mole  -     Ambulatory referral/consult to Dermatology; Future; Expected date: 02/09/2023    S/P cholecystectomy  Stable, is schedule to be seen by general surgeryTamanna on 2-7-23  Colon cancer screening  -     Case Request Endoscopy: COLONOSCOPY    Sleep disturbance  -     Polysomnogram (CPAP will be added if patient meets diagnostic criteria.); Future  -     Ambulatory referral/consult to Pulmonology; Future; Expected date: 02/09/2023    Habitual snoring  -     Polysomnogram (CPAP will be added if patient meets diagnostic criteria.); Future  -     Ambulatory referral/consult to Pulmonology; Future; Expected date: 02/09/2023    Daytime hypersomnia  -     Polysomnogram  (CPAP will be added if patient meets diagnostic criteria.); Future  -     Ambulatory referral/consult to Pulmonology; Future; Expected date: 02/09/2023    Anxiety  Stable, continue management  Pre-diabetes  Stable, continue management  Hemoglobin A1C   Date Value Ref Range Status   05/12/2022 6.0 (H) 4.0 - 5.6 % Final     Comment:     ADA Screening Guidelines:  5.7-6.4%  Consistent with prediabetes  >or=6.5%  Consistent with diabetes    High levels of fetal hemoglobin interfere with the HbA1C  assay. Heterozygous hemoglobin variants (HbS, HgC, etc)do  not significantly interfere with this assay.   However, presence of multiple variants may affect accuracy.     11/09/2021 6.0 (H) 4.0 - 5.6 % Final     Comment:     ADA Screening Guidelines:  5.7-6.4%  Consistent with prediabetes  >or=6.5%  Consistent with diabetes    High levels of fetal hemoglobin interfere with the HbA1C  assay. Heterozygous hemoglobin variants (HbS, HgC, etc)do  not significantly interfere with this assay.   However, presence of multiple variants may affect accuracy.     03/24/2021 5.8 (H) 4.0 - 5.6 % Final     Comment:     ADA Screening Guidelines:  5.7-6.4%  Consistent with prediabetes  >or=6.5%  Consistent with diabetes    High levels of fetal hemoglobin interfere with the HbA1C  assay. Heterozygous hemoglobin variants (HbS, HgC, etc)do  not significantly interfere with this assay.   However, presence of multiple variants may affect accuracy.        Iron deficiency  -     Iron and TIBC; Future; Expected date: 02/02/2023  -     FERRITIN; Future; Expected date: 02/02/2023    Encounter for imaging to assess osteopenia  -     DXA Bone Density Spine And Hip; Future; Expected date: 02/02/2023    Obesity (BMI 30.0-34.9)  Counseled patient on his ideal body weight, health consequences of being obese and current recommendations including weekly exercise and a heart healthy diet.  Current BMI is:Estimated body mass index is 31.14 kg/m² as calculated from  "the following:    Height as of this encounter: 5' 8" (1.727 m).    Weight as of this encounter: 92.9 kg (204 lb 12.9 oz)..  Patient is aware that ideal BMI < 25 or Weight in (lb) to have BMI = 25: 164.1.           Patient readiness: acceptance and barriers:none    During the course of the visit the patient was educated and counseled about the following:     Obesity:   General weight loss/lifestyle modification strategies discussed (elicit support from others; identify saboteurs; non-food rewards, etc).  Regular aerobic exercise program discussed.    Goals: Obesity: Reduce calorie intake and BMI    Did patient meet goals/outcomes: No    The following self management tools provided: declined    Patient Instructions (the written plan) was given to the patient/family.     Time spent with patient: 15 minutes    Barriers to medications present (no )    Adverse reactions to current medications (no)    Over the counter medications reviewed (Yes)          "

## 2023-02-03 ENCOUNTER — TELEPHONE (OUTPATIENT)
Dept: GASTROENTEROLOGY | Facility: CLINIC | Age: 68
End: 2023-02-03
Payer: COMMERCIAL

## 2023-02-03 NOTE — TELEPHONE ENCOUNTER
Colonoscopy 5/10 at 830am arrive for 730am instructions reviewed and patient states understanding. Copy to portal.

## 2023-02-06 ENCOUNTER — OFFICE VISIT (OUTPATIENT)
Dept: PULMONOLOGY | Facility: CLINIC | Age: 68
End: 2023-02-06
Payer: COMMERCIAL

## 2023-02-06 ENCOUNTER — LAB VISIT (OUTPATIENT)
Dept: LAB | Facility: HOSPITAL | Age: 68
End: 2023-02-06
Attending: NURSE PRACTITIONER
Payer: COMMERCIAL

## 2023-02-06 VITALS
WEIGHT: 204.5 LBS | BODY MASS INDEX: 30.99 KG/M2 | HEART RATE: 60 BPM | SYSTOLIC BLOOD PRESSURE: 110 MMHG | DIASTOLIC BLOOD PRESSURE: 61 MMHG | OXYGEN SATURATION: 97 % | HEIGHT: 68 IN

## 2023-02-06 DIAGNOSIS — G47.10 DAYTIME HYPERSOMNIA: ICD-10-CM

## 2023-02-06 DIAGNOSIS — G47.9 SLEEP DISTURBANCE: ICD-10-CM

## 2023-02-06 DIAGNOSIS — G47.33 OSA (OBSTRUCTIVE SLEEP APNEA): Primary | ICD-10-CM

## 2023-02-06 DIAGNOSIS — Z00.00 ANNUAL PHYSICAL EXAM: ICD-10-CM

## 2023-02-06 DIAGNOSIS — R06.83 HABITUAL SNORING: ICD-10-CM

## 2023-02-06 DIAGNOSIS — E61.1 IRON DEFICIENCY: ICD-10-CM

## 2023-02-06 LAB
ALBUMIN SERPL BCP-MCNC: 3.5 G/DL (ref 3.5–5.2)
ALP SERPL-CCNC: 65 U/L (ref 55–135)
ALT SERPL W/O P-5'-P-CCNC: 19 U/L (ref 10–44)
ANION GAP SERPL CALC-SCNC: 11 MMOL/L (ref 8–16)
AST SERPL-CCNC: 23 U/L (ref 10–40)
BILIRUB SERPL-MCNC: 0.3 MG/DL (ref 0.1–1)
BUN SERPL-MCNC: 12 MG/DL (ref 8–23)
CALCIUM SERPL-MCNC: 9.6 MG/DL (ref 8.7–10.5)
CHLORIDE SERPL-SCNC: 107 MMOL/L (ref 95–110)
CHOLEST SERPL-MCNC: 150 MG/DL (ref 120–199)
CHOLEST/HDLC SERPL: 4.3 {RATIO} (ref 2–5)
CO2 SERPL-SCNC: 21 MMOL/L (ref 23–29)
CREAT SERPL-MCNC: 0.8 MG/DL (ref 0.5–1.4)
EST. GFR  (NO RACE VARIABLE): >60 ML/MIN/1.73 M^2
ESTIMATED AVG GLUCOSE: 126 MG/DL (ref 68–131)
FERRITIN SERPL-MCNC: 131 NG/ML (ref 20–300)
GLUCOSE SERPL-MCNC: 90 MG/DL (ref 70–110)
HBA1C MFR BLD: 6 % (ref 4–5.6)
HDLC SERPL-MCNC: 35 MG/DL (ref 40–75)
HDLC SERPL: 23.3 % (ref 20–50)
IRON SERPL-MCNC: 75 UG/DL (ref 30–160)
LDLC SERPL CALC-MCNC: 100.2 MG/DL (ref 63–159)
NONHDLC SERPL-MCNC: 115 MG/DL
POTASSIUM SERPL-SCNC: 4 MMOL/L (ref 3.5–5.1)
PROT SERPL-MCNC: 7.7 G/DL (ref 6–8.4)
SATURATED IRON: 22 % (ref 20–50)
SODIUM SERPL-SCNC: 139 MMOL/L (ref 136–145)
TOTAL IRON BINDING CAPACITY: 334 UG/DL (ref 250–450)
TRANSFERRIN SERPL-MCNC: 226 MG/DL (ref 200–375)
TRIGL SERPL-MCNC: 74 MG/DL (ref 30–150)
TSH SERPL DL<=0.005 MIU/L-ACNC: 2.26 UIU/ML (ref 0.4–4)

## 2023-02-06 PROCEDURE — 3288F PR FALLS RISK ASSESSMENT DOCUMENTED: ICD-10-PCS | Mod: CPTII,S$GLB,, | Performed by: NURSE PRACTITIONER

## 2023-02-06 PROCEDURE — 36415 COLL VENOUS BLD VENIPUNCTURE: CPT | Mod: PO | Performed by: NURSE PRACTITIONER

## 2023-02-06 PROCEDURE — 3008F PR BODY MASS INDEX (BMI) DOCUMENTED: ICD-10-PCS | Mod: CPTII,S$GLB,, | Performed by: NURSE PRACTITIONER

## 2023-02-06 PROCEDURE — 84466 ASSAY OF TRANSFERRIN: CPT | Performed by: NURSE PRACTITIONER

## 2023-02-06 PROCEDURE — 1101F PT FALLS ASSESS-DOCD LE1/YR: CPT | Mod: CPTII,S$GLB,, | Performed by: NURSE PRACTITIONER

## 2023-02-06 PROCEDURE — 80061 LIPID PANEL: CPT | Performed by: NURSE PRACTITIONER

## 2023-02-06 PROCEDURE — 3074F SYST BP LT 130 MM HG: CPT | Mod: CPTII,S$GLB,, | Performed by: NURSE PRACTITIONER

## 2023-02-06 PROCEDURE — 1126F PR PAIN SEVERITY QUANTIFIED, NO PAIN PRESENT: ICD-10-PCS | Mod: CPTII,S$GLB,, | Performed by: NURSE PRACTITIONER

## 2023-02-06 PROCEDURE — 1101F PR PT FALLS ASSESS DOC 0-1 FALLS W/OUT INJ PAST YR: ICD-10-PCS | Mod: CPTII,S$GLB,, | Performed by: NURSE PRACTITIONER

## 2023-02-06 PROCEDURE — 84443 ASSAY THYROID STIM HORMONE: CPT | Performed by: NURSE PRACTITIONER

## 2023-02-06 PROCEDURE — 3078F DIAST BP <80 MM HG: CPT | Mod: CPTII,S$GLB,, | Performed by: NURSE PRACTITIONER

## 2023-02-06 PROCEDURE — 99999 PR PBB SHADOW E&M-EST. PATIENT-LVL III: ICD-10-PCS | Mod: PBBFAC,,, | Performed by: NURSE PRACTITIONER

## 2023-02-06 PROCEDURE — 99213 PR OFFICE/OUTPT VISIT, EST, LEVL III, 20-29 MIN: ICD-10-PCS | Mod: S$GLB,,, | Performed by: NURSE PRACTITIONER

## 2023-02-06 PROCEDURE — 83036 HEMOGLOBIN GLYCOSYLATED A1C: CPT | Performed by: NURSE PRACTITIONER

## 2023-02-06 PROCEDURE — 80053 COMPREHEN METABOLIC PANEL: CPT | Performed by: NURSE PRACTITIONER

## 2023-02-06 PROCEDURE — 1159F MED LIST DOCD IN RCRD: CPT | Mod: CPTII,S$GLB,, | Performed by: NURSE PRACTITIONER

## 2023-02-06 PROCEDURE — 99999 PR PBB SHADOW E&M-EST. PATIENT-LVL III: CPT | Mod: PBBFAC,,, | Performed by: NURSE PRACTITIONER

## 2023-02-06 PROCEDURE — 1126F AMNT PAIN NOTED NONE PRSNT: CPT | Mod: CPTII,S$GLB,, | Performed by: NURSE PRACTITIONER

## 2023-02-06 PROCEDURE — 82728 ASSAY OF FERRITIN: CPT | Performed by: NURSE PRACTITIONER

## 2023-02-06 PROCEDURE — 99213 OFFICE O/P EST LOW 20 MIN: CPT | Mod: S$GLB,,, | Performed by: NURSE PRACTITIONER

## 2023-02-06 PROCEDURE — 3074F PR MOST RECENT SYSTOLIC BLOOD PRESSURE < 130 MM HG: ICD-10-PCS | Mod: CPTII,S$GLB,, | Performed by: NURSE PRACTITIONER

## 2023-02-06 PROCEDURE — 1159F PR MEDICATION LIST DOCUMENTED IN MEDICAL RECORD: ICD-10-PCS | Mod: CPTII,S$GLB,, | Performed by: NURSE PRACTITIONER

## 2023-02-06 PROCEDURE — 3078F PR MOST RECENT DIASTOLIC BLOOD PRESSURE < 80 MM HG: ICD-10-PCS | Mod: CPTII,S$GLB,, | Performed by: NURSE PRACTITIONER

## 2023-02-06 PROCEDURE — 3008F BODY MASS INDEX DOCD: CPT | Mod: CPTII,S$GLB,, | Performed by: NURSE PRACTITIONER

## 2023-02-06 PROCEDURE — 3288F FALL RISK ASSESSMENT DOCD: CPT | Mod: CPTII,S$GLB,, | Performed by: NURSE PRACTITIONER

## 2023-02-06 NOTE — PROGRESS NOTES
2/6/2023    Bailey Joseph  New Patient Consult    Chief Complaint   Patient presents with    Apnea     Pt states can not stay asleep, snores, feels fatigue.    Fatigue    Shortness of Breath     Sometimes pt states.        HPI:  02/06/2023:  Denies being seen by prior pulmonologist.  Denies current use of inhaler therapy, supplemental oxygen, CPAP.  Denies personal history of cancer, PE, anticoagulation use.  Does endorse being diagnosed with asthma as a child.  Referred to pulmonology clinic today for evaluation of sleep apnea.  Endorses daytime fatigue, nocturnal arousals, snoring.  Denies morning headaches, depression.  Recently underwent cholecystectomy on January 16, 2023, while in hospital patient's SpO2 noted to decrease to 80 percent overnight while on room air, according to nursing note.  Denies shortness of breath currently, wheezing, chest tightness.  Denies cough, mucus production.  Endorses exposure to secondhand smoke.  SLEEP ROUTINE:  Activity the hour prior to sleep:  Reading  Bed partner:  Partner  Time to bed:  2200  Lights On/Off:  Off  Sleep onset latency:  5 minutes or so       Disruptions or awakenings:  Frequent arousals Nightly  Wakeup time:  6:00 a.m.  Perceived sleep quality:  Poor  Daytime naps:  When able      Weekend sleep routine:  No change  Caffeine use:  2 cups of coffee per day  Exercise habit:  No  EPWORTH SLEEPINESS SCALE 2/6/2023: 18        Social Hx:  Lives with friend -no animals in the home.  Former .  No Asbestosis exposure, Smoking Hx:  Never smoker  Family Hx:  Father and brother with Lung Cancer, no COPD, no Asthma  Medical Hx:  No previous pneumonia ; no previous shoulder/chest surgery      The chief compliant  problem is new to me   PFSH:  Past Medical History:   Diagnosis Date    Diabetes mellitus, type 2     Thyroid disease          Past Surgical History:   Procedure Laterality Date    BREAST BIOPSY      GALLBLADDER SURGERY N/A     LAPAROSCOPIC  CHOLECYSTECTOMY N/A 01/16/2023    Procedure: CHOLECYSTECTOMY, LAPAROSCOPIC;  Surgeon: Jean-Claude Salazar III, MD;  [place] [facility] [place]  Service: General;  Laterality: N/A;    LIPOMA RESECTION      TONSILLECTOMY       Social History     Tobacco Use    Smoking status: Never    Smokeless tobacco: Never   Substance Use Topics    Alcohol use: Yes    Drug use: No     Family History   Problem Relation Age of Onset    Diabetes Mother     Diabetes Sister     Diabetes Brother      Review of patient's allergies indicates:  No Known Allergies  I have reviewed past medical, family, and social history. I have reviewed previous nurse notes.    Performance Status:The patient's activity level is no limits with regular activity.      Review of Systems   Constitutional:  Positive for fatigue. Negative for activity change, appetite change, chills, diaphoresis, fever and unexpected weight change.   HENT:  Positive for congestion, postnasal drip and rhinorrhea. Negative for sinus pressure, sinus pain, sore throat and trouble swallowing.    Eyes:  Negative for photophobia and visual disturbance.   Respiratory:  Negative for cough, choking, chest tightness, shortness of breath and wheezing.    Cardiovascular:  Positive for leg swelling. Negative for chest pain and palpitations.   Gastrointestinal:  Negative for abdominal distention, abdominal pain, blood in stool, constipation, diarrhea, nausea and vomiting.   Genitourinary:  Negative for difficulty urinating, dysuria, flank pain and hematuria.   Musculoskeletal:  Negative for back pain, gait problem, joint swelling and neck pain.   Skin:  Negative for rash and wound.   Allergic/Immunologic: Negative for environmental allergies and food allergies.   Neurological:  Negative for dizziness, seizures, syncope, weakness, light-headedness, numbness and headaches.   Hematological:  Does not bruise/bleed easily.   Psychiatric/Behavioral:  Positive for sleep disturbance. Negative for confusion. The  "patient is not nervous/anxious.        Exam:Comprehensive exam done. /61 (BP Location: Left arm, Patient Position: Sitting, BP Method: Medium (Automatic))   Pulse 60   Ht 5' 8" (1.727 m)   Wt 92.8 kg (204 lb 7.6 oz)   SpO2 97% Comment: room air at rest  BMI 31.09 kg/m²   Exam included Vitals as listed  Constitutional: She is oriented to person, place, and time. She appears well-developed. No distress.   Nose: Nose normal.   Mouth/Throat: Uvula is midline, oropharynx is clear and moist and mucous membranes are normal. No dental caries. No oropharyngeal exudate, posterior oropharyngeal edema, posterior oropharyngeal erythema or tonsillar abscesses.    Eyes: Pupils are equal, round, and reactive to light.   Neck: No JVD present. No thyromegaly present.   Cardiovascular: Normal rate, regular rhythm and normal heart sounds. Exam reveals no gallop and no friction rub.   No murmur heard.  Pulmonary/Chest: Effort normal and breath sounds normal. No accessory muscle usage or stridor. No apnea and no tachypnea. No respiratory distress, decreased breath sounds, wheezes, rhonchi, rales, or tenderness. Clear breath sounds throughout, on room air, in no acute distress.  Abdominal: Soft. She exhibits no mass. There is no tenderness. No hepatosplenomegaly, hernias and normoactive bowel sounds  Musculoskeletal: Normal range of motion. exhibits no edema.   Neurological:  alert and oriented to person, place, and time. not disoriented.   Skin: Skin is warm and dry. Capillary refill takes less 2 sec. No cyanosis or erythema. No pallor. Nails show no clubbing.   Psychiatric: normal mood and affect. behavior is normal. Judgment and thought content normal.       Radiographs (ct chest and cxr) reviewed: view by direct vision     X-Ray Chest 1 View 1/17/2023 FINDINGS: Cardiomediastinal silhouette is within normal limits. Lungs are normally expanded with no airspace consolidation. No pleural effusion or pneumothorax. No acute " osseous abnormality.   IMPRESSION: No acute pulmonary process.      Labs reviewed    Lab Results   Component Value Date    WBC 7.68 01/18/2023    RBC 3.39 (L) 01/18/2023    HGB 10.2 (L) 01/18/2023    HCT 31.7 (L) 01/18/2023    MCV 94 01/18/2023    MCH 30.1 01/18/2023    MCHC 32.2 01/18/2023    RDW 14.6 (H) 01/18/2023     01/18/2023    MPV 10.3 01/18/2023    GRAN 4.5 01/18/2023    GRAN 59.0 01/18/2023    LYMPH 2.4 01/18/2023    LYMPH 30.7 01/18/2023    MONO 0.7 01/18/2023    MONO 9.2 01/18/2023    EOS 0.0 01/18/2023    BASO 0.03 01/18/2023    EOSINOPHIL 0.4 01/18/2023    BASOPHIL 0.4 01/18/2023       PFT was not done  Pulmonary Functions Testing Results:        Plan:  Clinical impression is apparently straight forward and impression with management as below.    Bailey was seen today for apnea, fatigue and shortness of breath.    Diagnoses and all orders for this visit:    BAO (obstructive sleep apnea)  -     X-Ray Chest PA And Lateral; Future    Sleep disturbance  -     Ambulatory referral/consult to Pulmonology    Habitual snoring  -     Ambulatory referral/consult to Pulmonology    Daytime hypersomnia  -     Ambulatory referral/consult to Pulmonology        Follow up in about 3 months (around 5/6/2023), or if symptoms worsen or fail to improve.    Discussed with patient above for education the following:      Patient Instructions   I have ordered an at home sleep study to evaluate for sleep apnea. If test is positive, I will order a CPAP machine. Please notify my clinic when you receive the machine to set up a 31 day compliance appointment. You must use the machine for a least 4 hours every night to avoid insurance denial.     Check chest x-ray to follow-up from prior.    Continue current medication regiment. Keep follow up appointment as scheduled. Please call the office if you have any questions or concerns.

## 2023-02-06 NOTE — PATIENT INSTRUCTIONS
I have ordered an at home sleep study to evaluate for sleep apnea. If test is positive, I will order a CPAP machine. Please notify my clinic when you receive the machine to set up a 31 day compliance appointment. You must use the machine for a least 4 hours every night to avoid insurance denial.     Check chest x-ray to follow-up from prior.    Continue current medication regiment. Keep follow up appointment as scheduled. Please call the office if you have any questions or concerns.

## 2023-02-07 ENCOUNTER — TELEPHONE (OUTPATIENT)
Dept: PULMONOLOGY | Facility: CLINIC | Age: 68
End: 2023-02-07
Payer: COMMERCIAL

## 2023-02-07 ENCOUNTER — OFFICE VISIT (OUTPATIENT)
Dept: SURGERY | Facility: CLINIC | Age: 68
End: 2023-02-07
Payer: COMMERCIAL

## 2023-02-07 ENCOUNTER — HOSPITAL ENCOUNTER (OUTPATIENT)
Dept: RADIOLOGY | Facility: HOSPITAL | Age: 68
Discharge: HOME | End: 2023-02-07
Attending: NURSE PRACTITIONER
Payer: COMMERCIAL

## 2023-02-07 VITALS
SYSTOLIC BLOOD PRESSURE: 118 MMHG | HEART RATE: 71 BPM | HEIGHT: 68 IN | RESPIRATION RATE: 16 BRPM | WEIGHT: 204.56 LBS | TEMPERATURE: 97 F | BODY MASS INDEX: 31 KG/M2 | DIASTOLIC BLOOD PRESSURE: 75 MMHG

## 2023-02-07 DIAGNOSIS — K81.0 ACUTE CHOLECYSTITIS: Primary | ICD-10-CM

## 2023-02-07 DIAGNOSIS — G47.33 OSA (OBSTRUCTIVE SLEEP APNEA): ICD-10-CM

## 2023-02-07 PROCEDURE — 3288F FALL RISK ASSESSMENT DOCD: CPT | Mod: CPTII,S$GLB,, | Performed by: SURGERY

## 2023-02-07 PROCEDURE — 71046 XR CHEST PA AND LATERAL: ICD-10-PCS | Mod: 26,,, | Performed by: RADIOLOGY

## 2023-02-07 PROCEDURE — 1126F AMNT PAIN NOTED NONE PRSNT: CPT | Mod: CPTII,S$GLB,, | Performed by: SURGERY

## 2023-02-07 PROCEDURE — 1160F PR REVIEW ALL MEDS BY PRESCRIBER/CLIN PHARMACIST DOCUMENTED: ICD-10-PCS | Mod: CPTII,S$GLB,, | Performed by: SURGERY

## 2023-02-07 PROCEDURE — 1160F RVW MEDS BY RX/DR IN RCRD: CPT | Mod: CPTII,S$GLB,, | Performed by: SURGERY

## 2023-02-07 PROCEDURE — 3008F BODY MASS INDEX DOCD: CPT | Mod: CPTII,S$GLB,, | Performed by: SURGERY

## 2023-02-07 PROCEDURE — 3008F PR BODY MASS INDEX (BMI) DOCUMENTED: ICD-10-PCS | Mod: CPTII,S$GLB,, | Performed by: SURGERY

## 2023-02-07 PROCEDURE — 71046 X-RAY EXAM CHEST 2 VIEWS: CPT | Mod: TC,FY

## 2023-02-07 PROCEDURE — 1101F PR PT FALLS ASSESS DOC 0-1 FALLS W/OUT INJ PAST YR: ICD-10-PCS | Mod: CPTII,S$GLB,, | Performed by: SURGERY

## 2023-02-07 PROCEDURE — 3078F DIAST BP <80 MM HG: CPT | Mod: CPTII,S$GLB,, | Performed by: SURGERY

## 2023-02-07 PROCEDURE — 1101F PT FALLS ASSESS-DOCD LE1/YR: CPT | Mod: CPTII,S$GLB,, | Performed by: SURGERY

## 2023-02-07 PROCEDURE — 99024 POSTOP FOLLOW-UP VISIT: CPT | Mod: S$GLB,,, | Performed by: SURGERY

## 2023-02-07 PROCEDURE — 3074F SYST BP LT 130 MM HG: CPT | Mod: CPTII,S$GLB,, | Performed by: SURGERY

## 2023-02-07 PROCEDURE — 3078F PR MOST RECENT DIASTOLIC BLOOD PRESSURE < 80 MM HG: ICD-10-PCS | Mod: CPTII,S$GLB,, | Performed by: SURGERY

## 2023-02-07 PROCEDURE — 3288F PR FALLS RISK ASSESSMENT DOCUMENTED: ICD-10-PCS | Mod: CPTII,S$GLB,, | Performed by: SURGERY

## 2023-02-07 PROCEDURE — 1126F PR PAIN SEVERITY QUANTIFIED, NO PAIN PRESENT: ICD-10-PCS | Mod: CPTII,S$GLB,, | Performed by: SURGERY

## 2023-02-07 PROCEDURE — 71046 X-RAY EXAM CHEST 2 VIEWS: CPT | Mod: 26,,, | Performed by: RADIOLOGY

## 2023-02-07 PROCEDURE — 1159F MED LIST DOCD IN RCRD: CPT | Mod: CPTII,S$GLB,, | Performed by: SURGERY

## 2023-02-07 PROCEDURE — 3074F PR MOST RECENT SYSTOLIC BLOOD PRESSURE < 130 MM HG: ICD-10-PCS | Mod: CPTII,S$GLB,, | Performed by: SURGERY

## 2023-02-07 PROCEDURE — 1159F PR MEDICATION LIST DOCUMENTED IN MEDICAL RECORD: ICD-10-PCS | Mod: CPTII,S$GLB,, | Performed by: SURGERY

## 2023-02-07 PROCEDURE — 99024 PR POST-OP FOLLOW-UP VISIT: ICD-10-PCS | Mod: S$GLB,,, | Performed by: SURGERY

## 2023-02-07 PROCEDURE — 3044F PR MOST RECENT HEMOGLOBIN A1C LEVEL <7.0%: ICD-10-PCS | Mod: CPTII,S$GLB,, | Performed by: SURGERY

## 2023-02-07 PROCEDURE — 3044F HG A1C LEVEL LT 7.0%: CPT | Mod: CPTII,S$GLB,, | Performed by: SURGERY

## 2023-02-08 ENCOUNTER — PATIENT MESSAGE (OUTPATIENT)
Dept: PULMONOLOGY | Facility: CLINIC | Age: 68
End: 2023-02-08
Payer: COMMERCIAL

## 2023-03-10 ENCOUNTER — HOSPITAL ENCOUNTER (OUTPATIENT)
Dept: RADIOLOGY | Facility: CLINIC | Age: 68
Discharge: HOME | End: 2023-03-10
Attending: NURSE PRACTITIONER
Payer: COMMERCIAL

## 2023-03-10 DIAGNOSIS — Z13.828 ENCOUNTER FOR IMAGING TO ASSESS OSTEOPENIA: ICD-10-CM

## 2023-03-10 PROCEDURE — 77080 DEXA BONE DENSITY SPINE HIP: ICD-10-PCS | Mod: 26,,, | Performed by: RADIOLOGY

## 2023-03-10 PROCEDURE — 77080 DXA BONE DENSITY AXIAL: CPT | Mod: TC,PO

## 2023-03-10 PROCEDURE — 77080 DXA BONE DENSITY AXIAL: CPT | Mod: 26,,, | Performed by: RADIOLOGY

## 2023-05-05 ENCOUNTER — LAB VISIT (OUTPATIENT)
Dept: LAB | Facility: HOSPITAL | Age: 68
End: 2023-05-05
Attending: NURSE PRACTITIONER
Payer: COMMERCIAL

## 2023-05-05 ENCOUNTER — OFFICE VISIT (OUTPATIENT)
Dept: FAMILY MEDICINE | Facility: CLINIC | Age: 68
End: 2023-05-05
Payer: COMMERCIAL

## 2023-05-05 VITALS
DIASTOLIC BLOOD PRESSURE: 62 MMHG | HEART RATE: 70 BPM | BODY MASS INDEX: 31.37 KG/M2 | HEIGHT: 68 IN | OXYGEN SATURATION: 98 % | SYSTOLIC BLOOD PRESSURE: 112 MMHG | TEMPERATURE: 98 F | WEIGHT: 207 LBS

## 2023-05-05 DIAGNOSIS — R10.11 RIGHT UPPER QUADRANT PAIN: ICD-10-CM

## 2023-05-05 DIAGNOSIS — K21.9 GASTROESOPHAGEAL REFLUX DISEASE, UNSPECIFIED WHETHER ESOPHAGITIS PRESENT: ICD-10-CM

## 2023-05-05 DIAGNOSIS — M72.2 PLANTAR FASCIITIS: ICD-10-CM

## 2023-05-05 DIAGNOSIS — R10.11 RIGHT UPPER QUADRANT PAIN: Primary | ICD-10-CM

## 2023-05-05 LAB
BASOPHILS # BLD AUTO: 0.07 K/UL (ref 0–0.2)
BASOPHILS NFR BLD: 1.6 % (ref 0–1.9)
DIFFERENTIAL METHOD: ABNORMAL
EOSINOPHIL # BLD AUTO: 0.3 K/UL (ref 0–0.5)
EOSINOPHIL NFR BLD: 5.6 % (ref 0–8)
ERYTHROCYTE [DISTWIDTH] IN BLOOD BY AUTOMATED COUNT: 14.6 % (ref 11.5–14.5)
HCT VFR BLD AUTO: 37.3 % (ref 37–48.5)
HGB BLD-MCNC: 11.9 G/DL (ref 12–16)
IMM GRANULOCYTES # BLD AUTO: 0 K/UL (ref 0–0.04)
IMM GRANULOCYTES NFR BLD AUTO: 0 % (ref 0–0.5)
LYMPHOCYTES # BLD AUTO: 2.4 K/UL (ref 1–4.8)
LYMPHOCYTES NFR BLD: 52.8 % (ref 18–48)
MCH RBC QN AUTO: 29.2 PG (ref 27–31)
MCHC RBC AUTO-ENTMCNC: 31.9 G/DL (ref 32–36)
MCV RBC AUTO: 91 FL (ref 82–98)
MONOCYTES # BLD AUTO: 0.5 K/UL (ref 0.3–1)
MONOCYTES NFR BLD: 10.2 % (ref 4–15)
NEUTROPHILS # BLD AUTO: 1.3 K/UL (ref 1.8–7.7)
NEUTROPHILS NFR BLD: 29.8 % (ref 38–73)
NRBC BLD-RTO: 0 /100 WBC
PLATELET # BLD AUTO: 323 K/UL (ref 150–450)
PMV BLD AUTO: 10.2 FL (ref 9.2–12.9)
RBC # BLD AUTO: 4.08 M/UL (ref 4–5.4)
WBC # BLD AUTO: 4.49 K/UL (ref 3.9–12.7)

## 2023-05-05 PROCEDURE — 3288F FALL RISK ASSESSMENT DOCD: CPT | Mod: CPTII,S$GLB,, | Performed by: NURSE PRACTITIONER

## 2023-05-05 PROCEDURE — 3078F DIAST BP <80 MM HG: CPT | Mod: CPTII,S$GLB,, | Performed by: NURSE PRACTITIONER

## 2023-05-05 PROCEDURE — 3074F PR MOST RECENT SYSTOLIC BLOOD PRESSURE < 130 MM HG: ICD-10-PCS | Mod: CPTII,S$GLB,, | Performed by: NURSE PRACTITIONER

## 2023-05-05 PROCEDURE — 36415 COLL VENOUS BLD VENIPUNCTURE: CPT | Mod: PO | Performed by: NURSE PRACTITIONER

## 2023-05-05 PROCEDURE — 99999 PR PBB SHADOW E&M-EST. PATIENT-LVL V: CPT | Mod: PBBFAC,,, | Performed by: NURSE PRACTITIONER

## 2023-05-05 PROCEDURE — 83690 ASSAY OF LIPASE: CPT | Performed by: NURSE PRACTITIONER

## 2023-05-05 PROCEDURE — 1160F PR REVIEW ALL MEDS BY PRESCRIBER/CLIN PHARMACIST DOCUMENTED: ICD-10-PCS | Mod: CPTII,S$GLB,, | Performed by: NURSE PRACTITIONER

## 2023-05-05 PROCEDURE — 3044F HG A1C LEVEL LT 7.0%: CPT | Mod: CPTII,S$GLB,, | Performed by: NURSE PRACTITIONER

## 2023-05-05 PROCEDURE — 3044F PR MOST RECENT HEMOGLOBIN A1C LEVEL <7.0%: ICD-10-PCS | Mod: CPTII,S$GLB,, | Performed by: NURSE PRACTITIONER

## 2023-05-05 PROCEDURE — 3288F PR FALLS RISK ASSESSMENT DOCUMENTED: ICD-10-PCS | Mod: CPTII,S$GLB,, | Performed by: NURSE PRACTITIONER

## 2023-05-05 PROCEDURE — 99213 OFFICE O/P EST LOW 20 MIN: CPT | Mod: S$GLB,,, | Performed by: NURSE PRACTITIONER

## 2023-05-05 PROCEDURE — 1125F AMNT PAIN NOTED PAIN PRSNT: CPT | Mod: CPTII,S$GLB,, | Performed by: NURSE PRACTITIONER

## 2023-05-05 PROCEDURE — 3078F PR MOST RECENT DIASTOLIC BLOOD PRESSURE < 80 MM HG: ICD-10-PCS | Mod: CPTII,S$GLB,, | Performed by: NURSE PRACTITIONER

## 2023-05-05 PROCEDURE — 3008F PR BODY MASS INDEX (BMI) DOCUMENTED: ICD-10-PCS | Mod: CPTII,S$GLB,, | Performed by: NURSE PRACTITIONER

## 2023-05-05 PROCEDURE — 85025 COMPLETE CBC W/AUTO DIFF WBC: CPT | Performed by: NURSE PRACTITIONER

## 2023-05-05 PROCEDURE — 1101F PR PT FALLS ASSESS DOC 0-1 FALLS W/OUT INJ PAST YR: ICD-10-PCS | Mod: CPTII,S$GLB,, | Performed by: NURSE PRACTITIONER

## 2023-05-05 PROCEDURE — 3074F SYST BP LT 130 MM HG: CPT | Mod: CPTII,S$GLB,, | Performed by: NURSE PRACTITIONER

## 2023-05-05 PROCEDURE — 80053 COMPREHEN METABOLIC PANEL: CPT | Performed by: NURSE PRACTITIONER

## 2023-05-05 PROCEDURE — 1159F MED LIST DOCD IN RCRD: CPT | Mod: CPTII,S$GLB,, | Performed by: NURSE PRACTITIONER

## 2023-05-05 PROCEDURE — 1160F RVW MEDS BY RX/DR IN RCRD: CPT | Mod: CPTII,S$GLB,, | Performed by: NURSE PRACTITIONER

## 2023-05-05 PROCEDURE — 1159F PR MEDICATION LIST DOCUMENTED IN MEDICAL RECORD: ICD-10-PCS | Mod: CPTII,S$GLB,, | Performed by: NURSE PRACTITIONER

## 2023-05-05 PROCEDURE — 1125F PR PAIN SEVERITY QUANTIFIED, PAIN PRESENT: ICD-10-PCS | Mod: CPTII,S$GLB,, | Performed by: NURSE PRACTITIONER

## 2023-05-05 PROCEDURE — 99999 PR PBB SHADOW E&M-EST. PATIENT-LVL V: ICD-10-PCS | Mod: PBBFAC,,, | Performed by: NURSE PRACTITIONER

## 2023-05-05 PROCEDURE — 99213 PR OFFICE/OUTPT VISIT, EST, LEVL III, 20-29 MIN: ICD-10-PCS | Mod: S$GLB,,, | Performed by: NURSE PRACTITIONER

## 2023-05-05 PROCEDURE — 3008F BODY MASS INDEX DOCD: CPT | Mod: CPTII,S$GLB,, | Performed by: NURSE PRACTITIONER

## 2023-05-05 PROCEDURE — 1101F PT FALLS ASSESS-DOCD LE1/YR: CPT | Mod: CPTII,S$GLB,, | Performed by: NURSE PRACTITIONER

## 2023-05-05 RX ORDER — FAMOTIDINE 20 MG/1
20 TABLET, FILM COATED ORAL 2 TIMES DAILY
Qty: 60 TABLET | Refills: 2 | Status: SHIPPED | OUTPATIENT
Start: 2023-05-05 | End: 2023-09-02 | Stop reason: SDUPTHER

## 2023-05-05 NOTE — PROGRESS NOTES
Subjective:       Patient ID: Bailey Joseph is a 67 y.o. female.    Chief Complaint: Abdominal Pain (RUQ)     HPI   66 y/o female patient with medical problems listed below presents for  RUQ pain for a week. Denies recent trauma to the affected area. Patient had laparoscopic cholecystectomy done in 1/2023. Describes pain as aching, pain 3/10 in intensity, constant, non radiating, aggravated by laying down to right side, no relieving factors, not related to meals, not associated with cough, chest pain, sob, nausea, vomiting, urinary or bowel symptoms, fever, chills, skin rash or skin changes but endorses acid reflux. She takes tumbs over the counter with mild relief. Last BM was this morning. Denies smoking but occ etoh.   Patient requests referral to podiatry for plantar fasciitis.     Labs reviewed from 2/2023    Patient Active Problem List   Diagnosis    Paresthesias    Pre-diabetes    Iron deficiency    Acute cholecystitis    BAO (obstructive sleep apnea)      Review of patient's allergies indicates:  No Known Allergies    Past Surgical History:   Procedure Laterality Date    BREAST BIOPSY      GALLBLADDER SURGERY N/A     LAPAROSCOPIC CHOLECYSTECTOMY N/A 01/16/2023    Procedure: CHOLECYSTECTOMY, LAPAROSCOPIC;  Surgeon: Jean-Claude Salazar III, MD;  Location: [facility];  Service: General;  Laterality: N/A;    LIPOMA RESECTION      TONSILLECTOMY          Current Outpatient Medications:     ferrous sulfate, dried (SLOW FE) 160 mg (50 mg iron) TbSR, Take 160 mg by mouth once daily., Disp: , Rfl:     famotidine (PEPCID) 20 MG tablet, Take 1 tablet (20 mg total) by mouth 2 (two) times daily., Disp: 60 tablet, Rfl: 2    Lab Results   Component Value Date    WBC 7.68 01/18/2023    HGB 10.2 (L) 01/18/2023    HCT 31.7 (L) 01/18/2023     01/18/2023    CHOL 150 02/06/2023    TRIG 74 02/06/2023    HDL 35 (L) 02/06/2023    ALT 19 02/06/2023    AST 23 02/06/2023     02/06/2023    K 4.0 02/06/2023     02/06/2023  "   CREATININE 0.8 02/06/2023    BUN 12 02/06/2023    CO2 21 (L) 02/06/2023    TSH 2.262 02/06/2023    HGBA1C 6.0 (H) 02/06/2023     Review of Systems   Constitutional:  Negative for chills and fever.   Respiratory:  Negative for cough, chest tightness and shortness of breath.    Cardiovascular:  Negative for chest pain and palpitations.   Gastrointestinal:  Positive for abdominal pain. Negative for blood in stool, constipation, diarrhea, nausea and vomiting.   Genitourinary:  Negative for dysuria.   Musculoskeletal:  Negative for back pain.   Skin:  Negative for color change.   Neurological:  Negative for dizziness and headaches.       Objective:   /62 (BP Location: Right arm, Patient Position: Sitting, BP Method: Large (Manual))   Pulse 70   Temp 98 °F (36.7 °C) (Oral)   Ht 5' 8" (1.727 m)   Wt 93.9 kg (207 lb 0.2 oz)   SpO2 98%   BMI 31.48 kg/m²         Physical Exam  Constitutional:       General: She is not in acute distress.     Appearance: Normal appearance.   HENT:      Head: Atraumatic.   Cardiovascular:      Rate and Rhythm: Normal rate and regular rhythm.      Pulses: Normal pulses.      Heart sounds: Normal heart sounds.   Pulmonary:      Effort: Pulmonary effort is normal.      Breath sounds: Normal breath sounds.   Abdominal:      General: Abdomen is flat. Bowel sounds are normal.      Palpations: Abdomen is soft.      Tenderness: There is abdominal tenderness.      Comments: +tenderness in RUQ   Skin:     General: Skin is warm and dry.   Neurological:      General: No focal deficit present.      Mental Status: She is alert and oriented to person, place, and time.   Psychiatric:         Mood and Affect: Mood normal.       Assessment:       1. Right upper quadrant pain    2. Gastroesophageal reflux disease, unspecified whether esophagitis present    3. Plantar fasciitis        Plan:       1. Right upper quadrant pain  - US Abdomen Complete; Future  - CBC Auto Differential; Future  - " Comprehensive Metabolic Panel; Future  - Lipase; Future  - Will follow result     2. Gastroesophageal reflux disease, unspecified whether esophagitis present  - Dietary precautions discussed  - famotidine (PEPCID) 20 MG tablet; Take 1 tablet (20 mg total) by mouth 2 (two) times daily.  Dispense: 60 tablet; Refill: 2    3. Plantar fasciitis  - Ambulatory referral/consult to Podiatry; Future     Mirtha SIMON

## 2023-05-06 LAB
ALBUMIN SERPL BCP-MCNC: 3.8 G/DL (ref 3.5–5.2)
ALP SERPL-CCNC: 72 U/L (ref 55–135)
ALT SERPL W/O P-5'-P-CCNC: 33 U/L (ref 10–44)
ANION GAP SERPL CALC-SCNC: 7 MMOL/L (ref 8–16)
AST SERPL-CCNC: 32 U/L (ref 10–40)
BILIRUB SERPL-MCNC: 0.2 MG/DL (ref 0.1–1)
BUN SERPL-MCNC: 12 MG/DL (ref 8–23)
CALCIUM SERPL-MCNC: 9.4 MG/DL (ref 8.7–10.5)
CHLORIDE SERPL-SCNC: 105 MMOL/L (ref 95–110)
CO2 SERPL-SCNC: 27 MMOL/L (ref 23–29)
CREAT SERPL-MCNC: 1 MG/DL (ref 0.5–1.4)
EST. GFR  (NO RACE VARIABLE): >60 ML/MIN/1.73 M^2
GLUCOSE SERPL-MCNC: 87 MG/DL (ref 70–110)
LIPASE SERPL-CCNC: 42 U/L (ref 4–60)
POTASSIUM SERPL-SCNC: 4.3 MMOL/L (ref 3.5–5.1)
PROT SERPL-MCNC: 8.2 G/DL (ref 6–8.4)
SODIUM SERPL-SCNC: 139 MMOL/L (ref 136–145)

## 2023-05-08 ENCOUNTER — OFFICE VISIT (OUTPATIENT)
Dept: DERMATOLOGY | Facility: CLINIC | Age: 68
End: 2023-05-08
Payer: COMMERCIAL

## 2023-05-08 VITALS — HEIGHT: 68 IN | BODY MASS INDEX: 31.37 KG/M2 | WEIGHT: 207 LBS

## 2023-05-08 DIAGNOSIS — L72.9 CYST OF SKIN: ICD-10-CM

## 2023-05-08 DIAGNOSIS — L81.4 LENTIGO: Primary | ICD-10-CM

## 2023-05-08 DIAGNOSIS — L82.1 SEBORRHEIC KERATOSES: ICD-10-CM

## 2023-05-08 DIAGNOSIS — D22.9 BENIGN NEVUS: ICD-10-CM

## 2023-05-08 DIAGNOSIS — L57.8 ACTINIC SKIN DAMAGE: ICD-10-CM

## 2023-05-08 PROCEDURE — 3044F PR MOST RECENT HEMOGLOBIN A1C LEVEL <7.0%: ICD-10-PCS | Mod: CPTII,S$GLB,, | Performed by: STUDENT IN AN ORGANIZED HEALTH CARE EDUCATION/TRAINING PROGRAM

## 2023-05-08 PROCEDURE — 3044F HG A1C LEVEL LT 7.0%: CPT | Mod: CPTII,S$GLB,, | Performed by: STUDENT IN AN ORGANIZED HEALTH CARE EDUCATION/TRAINING PROGRAM

## 2023-05-08 PROCEDURE — 99203 PR OFFICE/OUTPT VISIT, NEW, LEVL III, 30-44 MIN: ICD-10-PCS | Mod: 25,S$GLB,, | Performed by: STUDENT IN AN ORGANIZED HEALTH CARE EDUCATION/TRAINING PROGRAM

## 2023-05-08 PROCEDURE — 99999 PR PBB SHADOW E&M-EST. PATIENT-LVL III: CPT | Mod: PBBFAC,,, | Performed by: STUDENT IN AN ORGANIZED HEALTH CARE EDUCATION/TRAINING PROGRAM

## 2023-05-08 PROCEDURE — 3288F PR FALLS RISK ASSESSMENT DOCUMENTED: ICD-10-PCS | Mod: CPTII,S$GLB,, | Performed by: STUDENT IN AN ORGANIZED HEALTH CARE EDUCATION/TRAINING PROGRAM

## 2023-05-08 PROCEDURE — 88304 PR  SURG PATH,LEVEL III: ICD-10-PCS | Mod: 26,,, | Performed by: PATHOLOGY

## 2023-05-08 PROCEDURE — 88304 TISSUE EXAM BY PATHOLOGIST: CPT | Mod: 26,,, | Performed by: PATHOLOGY

## 2023-05-08 PROCEDURE — 99999 PR PBB SHADOW E&M-EST. PATIENT-LVL III: ICD-10-PCS | Mod: PBBFAC,,, | Performed by: STUDENT IN AN ORGANIZED HEALTH CARE EDUCATION/TRAINING PROGRAM

## 2023-05-08 PROCEDURE — 3008F BODY MASS INDEX DOCD: CPT | Mod: CPTII,S$GLB,, | Performed by: STUDENT IN AN ORGANIZED HEALTH CARE EDUCATION/TRAINING PROGRAM

## 2023-05-08 PROCEDURE — 1160F PR REVIEW ALL MEDS BY PRESCRIBER/CLIN PHARMACIST DOCUMENTED: ICD-10-PCS | Mod: CPTII,S$GLB,, | Performed by: STUDENT IN AN ORGANIZED HEALTH CARE EDUCATION/TRAINING PROGRAM

## 2023-05-08 PROCEDURE — 1159F PR MEDICATION LIST DOCUMENTED IN MEDICAL RECORD: ICD-10-PCS | Mod: CPTII,S$GLB,, | Performed by: STUDENT IN AN ORGANIZED HEALTH CARE EDUCATION/TRAINING PROGRAM

## 2023-05-08 PROCEDURE — 1101F PR PT FALLS ASSESS DOC 0-1 FALLS W/OUT INJ PAST YR: ICD-10-PCS | Mod: CPTII,S$GLB,, | Performed by: STUDENT IN AN ORGANIZED HEALTH CARE EDUCATION/TRAINING PROGRAM

## 2023-05-08 PROCEDURE — 1101F PT FALLS ASSESS-DOCD LE1/YR: CPT | Mod: CPTII,S$GLB,, | Performed by: STUDENT IN AN ORGANIZED HEALTH CARE EDUCATION/TRAINING PROGRAM

## 2023-05-08 PROCEDURE — 99203 OFFICE O/P NEW LOW 30 MIN: CPT | Mod: 25,S$GLB,, | Performed by: STUDENT IN AN ORGANIZED HEALTH CARE EDUCATION/TRAINING PROGRAM

## 2023-05-08 PROCEDURE — 1160F RVW MEDS BY RX/DR IN RCRD: CPT | Mod: CPTII,S$GLB,, | Performed by: STUDENT IN AN ORGANIZED HEALTH CARE EDUCATION/TRAINING PROGRAM

## 2023-05-08 PROCEDURE — 11104 PR PUNCH BIOPSY, SKIN, SINGLE LESION: ICD-10-PCS | Mod: S$GLB,,, | Performed by: STUDENT IN AN ORGANIZED HEALTH CARE EDUCATION/TRAINING PROGRAM

## 2023-05-08 PROCEDURE — 3288F FALL RISK ASSESSMENT DOCD: CPT | Mod: CPTII,S$GLB,, | Performed by: STUDENT IN AN ORGANIZED HEALTH CARE EDUCATION/TRAINING PROGRAM

## 2023-05-08 PROCEDURE — 3008F PR BODY MASS INDEX (BMI) DOCUMENTED: ICD-10-PCS | Mod: CPTII,S$GLB,, | Performed by: STUDENT IN AN ORGANIZED HEALTH CARE EDUCATION/TRAINING PROGRAM

## 2023-05-08 PROCEDURE — 88304 TISSUE EXAM BY PATHOLOGIST: CPT | Performed by: PATHOLOGY

## 2023-05-08 PROCEDURE — 11104 PUNCH BX SKIN SINGLE LESION: CPT | Mod: S$GLB,,, | Performed by: STUDENT IN AN ORGANIZED HEALTH CARE EDUCATION/TRAINING PROGRAM

## 2023-05-08 PROCEDURE — 1159F MED LIST DOCD IN RCRD: CPT | Mod: CPTII,S$GLB,, | Performed by: STUDENT IN AN ORGANIZED HEALTH CARE EDUCATION/TRAINING PROGRAM

## 2023-05-08 NOTE — PROGRESS NOTES
"  Subjective:      Patient ID:  Bailey Joseph is a 67 y.o. female who presents for   Chief Complaint   Patient presents with    Skin Check     UBSC     New Patient    Patient here today for UBSC  C/O spots to face " a while". No symptoms.  C/o spot to left forearm x "long time". No symptoms.    Review of Systems   Constitutional:  Negative for fever, chills and fatigue.   Skin:  Positive for activity-related sunscreen use. Negative for daily sunscreen use.   Hematologic/Lymphatic: Bruises/bleeds easily.     Objective:   Physical Exam   Constitutional: She appears well-developed and well-nourished. No distress.   Neurological: She is alert and oriented to person, place, and time. She is not disoriented.   Psychiatric: She has a normal mood and affect.   Skin:   Areas Examined (abnormalities noted in diagram):   Scalp / Hair Palpated and Inspected  Head / Face Inspection Performed  Neck Inspection Performed  Chest / Axilla Inspection Performed  Abdomen Inspection Performed  Back Inspection Performed  RUE Inspected  LUE Inspection Performed  Nails and Digits Inspection Performed               Diagram Legend     Erythematous scaling macule/papule c/w actinic keratosis       Vascular papule c/w angioma      Pigmented verrucoid papule/plaque c/w seborrheic keratosis      Yellow umbilicated papule c/w sebaceous hyperplasia      Irregularly shaped tan macule c/w lentigo     1-2 mm smooth white papules consistent with Milia      Movable subcutaneous cyst with punctum c/w epidermal inclusion cyst      Subcutaneous movable cyst c/w pilar cyst      Firm pink to brown papule c/w dermatofibroma      Pedunculated fleshy papule(s) c/w skin tag(s)      Evenly pigmented macule c/w junctional nevus     Mildly variegated pigmented, slightly irregular-bordered macule c/w mildly atypical nevus      Flesh colored to evenly pigmented papule c/w intradermal nevus       Pink pearly papule/plaque c/w basal cell carcinoma      Erythematous " hyperkeratotic cursted plaque c/w SCC      Surgical scar with no sign of skin cancer recurrence      Open and closed comedones      Inflammatory papules and pustules      Verrucoid papule consistent consistent with wart     Erythematous eczematous patches and plaques     Dystrophic onycholytic nail with subungual debris c/w onychomycosis     Umbilicated papule    Erythematous-base heme-crusted tan verrucoid plaque consistent with inflamed seborrheic keratosis     Erythematous Silvery Scaling Plaque c/w Psoriasis     See annotation        Assessment / Plan:      Pathology Orders:       Normal Orders This Visit    Specimen to Pathology, Dermatology     Questions:    Procedure Type: Dermatology and skin neoplasms    Number of Specimens: 1    ------------------------: -------------------------    Spec 1 Procedure: Biopsy    Spec 1 Clinical Impression: cyst of skin    Spec 1 Source: mid back    Release to patient:           Lentigo  This is a benign hyperpigmented sun induced lesion. Daily sun protection will reduce the number of new lesions. Treatment of these benign lesions are considered cosmetic.    Seborrheic keratoses  -     Ambulatory referral/consult to Dermatology  These are benign inherited growths without a malignant potential. Reassurance given to patient. No treatment is necessary.     Benign nevus  Careful dermoscopy evaluation of nevi performed with none identified as needing biopsy today  Monitor for new mole or moles that are becoming bigger, darker, irritated, or developing irregular borders.     Cyst of skin  -     Specimen to Pathology, Dermatology  Punch biopsy procedure note:  Punch biopsy performed after verbal consent obtained. Area marked and prepped with alcohol. Approximately 1cc of 1% lidocaine with epinephrine injected. 5 mm disposable punch used to remove lesion. Hemostasis obtained and biopsy site closed with 1 - 2 Prolene sutures. Wound care instructions reviewed with patient and handout  given.    Actinic skin damage  Upper body skin examination performed today including at least 9 points as noted in physical examination. No lesions suspicious for malignancy noted.  Patient instructed in importance in daily broad spectrum sun protection of at least spf 30. Mineral sunscreen ingredients preferred (Zinc +/- Titanium) and can be found OTC.   Patient encouraged to wear hat for all outdoor exposure.   Also discussed sun avoidance and use of protective clothing.         2 week s/r    No follow-ups on file.

## 2023-05-08 NOTE — PATIENT INSTRUCTIONS
Punch Biopsy Wound Care    Your doctor has performed a punch biopsy today.  A band aid and antibiotic ointment has been placed over the site.  This should remain in place for 24 hours.  It is recommended that you keep the area dry for the first 24 hours.  After 24 hours, you may remove the band aid and wash the area with warm soap and water and apply Vaseline jelly.  Many patients prefer to use Neosporin or Bacitracin ointment.  This is acceptable; however know that you can develop an allergy to this medication even if you have used it safely for years.  It is important to keep the area moist.  Letting it dry out and get air slows healing time, will worsen the scar, and make it more difficult to remove the stitches if they were placed.  Band aid is optional after first 24 hours.      If you notice increasing redness, tenderness, pain, or yellow drainage at the biopsy or surgical site, please notify your doctor.  These are signs of an infection.    If your biopsy/surgical site is bleeding, apply firm pressure for 15 minutes straight.  Repeat for another 15 minutes, if it is still bleeding.   If the surgical site continues to bleed, then please contact your doctor.      [address] [facility] [place] [address]/ [phone] FAX/ [email or web address]

## 2023-05-09 ENCOUNTER — PATIENT MESSAGE (OUTPATIENT)
Dept: GASTROENTEROLOGY | Facility: CLINIC | Age: 68
End: 2023-05-09
Payer: COMMERCIAL

## 2023-05-09 ENCOUNTER — PATIENT MESSAGE (OUTPATIENT)
Dept: RESEARCH | Facility: HOSPITAL | Age: 68
End: 2023-05-09
Payer: COMMERCIAL

## 2023-05-10 ENCOUNTER — ANESTHESIA EVENT (OUTPATIENT)
Dept: ENDOSCOPY | Facility: HOSPITAL | Age: 68
End: 2023-05-10
Payer: COMMERCIAL

## 2023-05-10 ENCOUNTER — HOSPITAL ENCOUNTER (OUTPATIENT)
Facility: HOSPITAL | Age: 68
Discharge: HOME | End: 2023-05-10
Attending: INTERNAL MEDICINE | Admitting: INTERNAL MEDICINE
Payer: COMMERCIAL

## 2023-05-10 ENCOUNTER — ANESTHESIA (OUTPATIENT)
Dept: ENDOSCOPY | Facility: HOSPITAL | Age: 68
End: 2023-05-10
Payer: COMMERCIAL

## 2023-05-10 DIAGNOSIS — Z12.11 SCREEN FOR COLON CANCER: ICD-10-CM

## 2023-05-10 PROCEDURE — D9220A PRA ANESTHESIA: Mod: ANES,,, | Performed by: ANESTHESIOLOGY

## 2023-05-10 PROCEDURE — 25000003 PHARM REV CODE 250: Performed by: INTERNAL MEDICINE

## 2023-05-10 PROCEDURE — D9220A PRA ANESTHESIA: ICD-10-PCS | Mod: ANES,,, | Performed by: ANESTHESIOLOGY

## 2023-05-10 PROCEDURE — G0121 COLON CA SCRN NOT HI RSK IND: ICD-10-PCS | Mod: ,,, | Performed by: INTERNAL MEDICINE

## 2023-05-10 PROCEDURE — 25000003 PHARM REV CODE 250: Performed by: STUDENT IN AN ORGANIZED HEALTH CARE EDUCATION/TRAINING PROGRAM

## 2023-05-10 PROCEDURE — 37000008 HC ANESTHESIA 1ST 15 MINUTES: Performed by: INTERNAL MEDICINE

## 2023-05-10 PROCEDURE — D9220A PRA ANESTHESIA: ICD-10-PCS | Mod: CRNA,,, | Performed by: STUDENT IN AN ORGANIZED HEALTH CARE EDUCATION/TRAINING PROGRAM

## 2023-05-10 PROCEDURE — G0121 COLON CA SCRN NOT HI RSK IND: HCPCS | Performed by: INTERNAL MEDICINE

## 2023-05-10 PROCEDURE — 37000009 HC ANESTHESIA EA ADD 15 MINS: Performed by: INTERNAL MEDICINE

## 2023-05-10 PROCEDURE — 63600175 PHARM REV CODE 636 W HCPCS: Performed by: STUDENT IN AN ORGANIZED HEALTH CARE EDUCATION/TRAINING PROGRAM

## 2023-05-10 PROCEDURE — G0121 COLON CA SCRN NOT HI RSK IND: HCPCS | Mod: ,,, | Performed by: INTERNAL MEDICINE

## 2023-05-10 PROCEDURE — D9220A PRA ANESTHESIA: Mod: CRNA,,, | Performed by: STUDENT IN AN ORGANIZED HEALTH CARE EDUCATION/TRAINING PROGRAM

## 2023-05-10 RX ORDER — PROPOFOL 10 MG/ML
VIAL (ML) INTRAVENOUS
Status: DISCONTINUED | OUTPATIENT
Start: 2023-05-10 | End: 2023-05-10

## 2023-05-10 RX ORDER — LIDOCAINE HYDROCHLORIDE 20 MG/ML
INJECTION INTRAVENOUS
Status: DISCONTINUED | OUTPATIENT
Start: 2023-05-10 | End: 2023-05-10

## 2023-05-10 RX ORDER — SODIUM CHLORIDE 9 MG/ML
INJECTION, SOLUTION INTRAVENOUS CONTINUOUS
Status: DISCONTINUED | OUTPATIENT
Start: 2023-05-10 | End: 2023-05-10 | Stop reason: HOSPADM

## 2023-05-10 RX ADMIN — PROPOFOL 30 MG: 10 INJECTION, EMULSION INTRAVENOUS at 08:05

## 2023-05-10 RX ADMIN — PROPOFOL 50 MG: 10 INJECTION, EMULSION INTRAVENOUS at 08:05

## 2023-05-10 RX ADMIN — LIDOCAINE HYDROCHLORIDE 50 MG: 20 INJECTION, SOLUTION INTRAVENOUS at 08:05

## 2023-05-10 RX ADMIN — SODIUM CHLORIDE: 9 INJECTION, SOLUTION INTRAVENOUS at 08:05

## 2023-05-10 NOTE — PLAN OF CARE
Vss, lukas po fluids, denies pain, ambulates easily. IV removed, catheter intact. Discharge instructions provided and states understanding. States ready to go home.  Discharged from facility with family per wheelchair.

## 2023-05-10 NOTE — TRANSFER OF CARE
"Anesthesia Transfer of Care Note    Patient: Bailey Joseph    Procedure(s) Performed: Procedure(s) (LRB):  COLONOSCOPY (N/A)    Patient location: PACU    Anesthesia Type: general    Transport from OR: Transported from OR on room air with adequate spontaneous ventilation    Post pain: adequate analgesia    Post assessment: no apparent anesthetic complications    Post vital signs: stable    Level of consciousness: awake and alert    Nausea/Vomiting: no nausea/vomiting    Complications: none    Transfer of care protocol was followed      Last vitals:   Visit Vitals  BP (!) 115/59 (BP Location: Left arm, Patient Position: Lying)   Pulse 60   Temp 36.4 °C (97.5 °F) (Temporal)   Resp 14   Ht 5' 8" (1.727 m)   Wt 93 kg (205 lb)   SpO2 99%   Breastfeeding No   BMI 31.17 kg/m²     "

## 2023-05-10 NOTE — H&P
"[facility]    CC: Screening colonoscopy    HPI 67 y.o. female presents for initial routine screening colonoscopy    History reviewed. No pertinent past medical history.    Allergies and Medications reviewed     Review of Systems  General ROS: negative for - chills, fever or weight loss  Cardiovascular ROS: no chest pain or dyspnea on exertion  Gastrointestinal ROS: no blood in stool    Physical Examination  BP (!) 115/59 (BP Location: Left arm, Patient Position: Lying)   Pulse 60   Temp 97.5 °F (36.4 °C) (Temporal)   Resp 14   Ht 5' 8" (1.727 m)   Wt 93 kg (205 lb)   SpO2 99%   Breastfeeding No   BMI 31.17 kg/m²   General appearance: alert, cooperative, no distress  HENT: Normocephalic, atraumatic, neck symmetrical, no nasal discharge, sclera anicteric   Lungs: clear to auscultation bilaterally, symmetric chest wall expansion bilaterally  Heart: regular rate and rhythm without rub; no displacement of the PMI   Abdomen: soft  Extremities: extremities symmetric; no clubbing, cyanosis, or edema        Labs:  Lab Results   Component Value Date    WBC 4.49 05/05/2023    HGB 11.9 (L) 05/05/2023    HCT 37.3 05/05/2023    MCV 91 05/05/2023     05/05/2023       Assessment:   67 y.o. female presents for colonoscopy    Plan:  -Proceed to colonoscopy     Angelica Washburn MD  [facility]  [address], [address]  [place] [address]  Office: [phone]  Fax: [phone]   "

## 2023-05-10 NOTE — PROVATION PATIENT INSTRUCTIONS
Discharge Summary/Instructions after an Endoscopic Procedure  Patient Name: Bailey Joseph  Patient MRN: [id number]  Patient YOB: 1955  Wednesday, May 10, 2023  Angelica Washburn MD  Dear patient,  As a result of recent federal legislation (The Federal Cures Act), you may   receive lab or pathology results from your procedure in your [facility]   account before your physician is able to contact you. Your physician or   their representative will relay the results to you with their   recommendations at their soonest availability.  Thank you,  RESTRICTIONS:  During your procedure today, you received medications for sedation.  These   medications may affect your judgment, balance and coordination.  Therefore,   for 24 hours, you have the following restrictions:   - DO NOT drive a car, operate machinery, make legal/financial decisions,   sign important papers or drink alcohol.    ACTIVITY:  Today: no heavy lifting, straining or running due to procedural   sedation/anesthesia.  The following day: return to full activity including work.  DIET:  Eat and drink normally unless instructed otherwise.     TREATMENT FOR COMMON SIDE EFFECTS:  - Mild abdominal pain, nausea, belching, bloating or excessive gas:  rest,   eat lightly and use a heating pad.  - Sore Throat: treat with throat lozenges and/or gargle with warm salt   water.  - Because air was used during the procedure, expelling large amounts of air   from your rectum or belching is normal.  - If a bowel prep was taken, you may not have a bowel movement for 1-3 days.    This is normal.  SYMPTOMS TO WATCH FOR AND REPORT TO YOUR PHYSICIAN:  1. Abdominal pain or bloating, other than gas cramps.  2. Chest pain.  3. Back pain.  4. Signs of infection such as: chills or fever occurring within 24 hours   after the procedure.  5. Rectal bleeding, which would show as bright red, maroon, or black stools.   (A tablespoon of blood from the rectum is not serious,  especially if   hemorrhoids are present.)  6. Vomiting.  7. Weakness or dizziness.  GO DIRECTLY TO THE NEAREST EMERGENCY ROOM IF YOU HAVE ANY OF THE FOLLOWING:      Difficulty breathing              Chills and/or fever over 101 F   Persistent vomiting and/or vomiting blood   Severe abdominal pain   Severe chest pain   Black, tarry stools   Bleeding- more than one tablespoon   Any other symptom or condition that you feel may need urgent attention  Your doctor recommends these additional instructions:  If any biopsies were taken, your doctors clinic will contact you in 1 to 2   weeks with any results.  - Discharge patient to home (with escort).   - Patient has a contact number available for emergencies.  The signs and   symptoms of potential delayed complications were discussed with the   patient.  Return to normal activities tomorrow.  Written discharge   instructions were provided to the patient.   - Resume previous diet.   - Continue present medications.   - Repeat colonoscopy in 10 years for screening purposes.   - Return to my office PRN.  For questions, problems or results please call your physician - Angelica Washburn MD at Work:  [phone].  [facility], EMERGENCY ROOM PHONE NUMBER: [phone]  IF A COMPLICATION OR EMERGENCY SITUATION ARISES AND YOU ARE UNABLE TO REACH   YOUR PHYSICIAN - GO DIRECTLY TO THE EMERGENCY ROOM.  Angelica Washburn MD  5/10/2023 8:55:53 AM  This report has been verified and signed electronically.  Dear patient,  As a result of recent federal legislation (The Federal Cures Act), you may   receive lab or pathology results from your procedure in your [facility]   account before your physician is able to contact you. Your physician or   their representative will relay the results to you with their   recommendations at their soonest availability.  Thank you,  PROVATION

## 2023-05-10 NOTE — ANESTHESIA POSTPROCEDURE EVALUATION
Anesthesia Post Evaluation    Patient: Bailey Joseph    Procedure(s) Performed: Procedure(s) (LRB):  COLONOSCOPY (N/A)    Final Anesthesia Type: general      Patient location during evaluation: PACU  Patient participation: Yes- Able to Participate  Level of consciousness: awake and alert  Post-procedure vital signs: reviewed and stable  Pain management: adequate  Airway patency: patent    PONV status at discharge: No PONV  Anesthetic complications: no      Cardiovascular status: hemodynamically stable  Respiratory status: unassisted and room air  Hydration status: euvolemic  Follow-up not needed.          Vitals Value Taken Time   BP 92/52 05/10/23 0915   Temp 36.7 °C (98.1 °F) 05/10/23 0855   Pulse 56 05/10/23 0918   Resp 18 05/10/23 0915   SpO2 99 % 05/10/23 0918   Vitals shown include unvalidated device data.      Event Time   Out of Recovery 09:50:30         Pain/Lynne Score: Lynne Score: 10 (5/10/2023  9:15 AM)

## 2023-05-10 NOTE — ANESTHESIA PREPROCEDURE EVALUATION
05/10/2023  Bailey Joseph is a 67 y.o., female.      Pre-op Assessment    I have reviewed the Patient Summary Reports.     I have reviewed the Nursing Notes. I have reviewed the NPO Status.   I have reviewed the Medications.     Review of Systems  Anesthesia Hx:  No problems with previous Anesthesia    Social:  Non-Smoker    Cardiovascular:  Cardiovascular Normal     Pulmonary:   Sleep Apnea    Renal/:  Renal/ Normal     Hepatic/GI:   Bowel Prep.    Musculoskeletal:  Musculoskeletal Normal    Neurological:  Neurology Normal    Endocrine:  Obesity / BMI > 30  Dermatological:  Skin Normal    Psych:  Psychiatric Normal           Physical Exam  General: Well nourished, Cooperative, Alert and Oriented    Airway:  Mallampati: II   Mouth Opening: Normal  TM Distance: Normal  Tongue: Normal  Neck ROM: Normal ROM    Dental:  Intact        Anesthesia Plan  Type of Anesthesia, risks & benefits discussed:    Anesthesia Type: Gen Natural Airway  Intra-op Monitoring Plan: Standard ASA Monitors  Induction:  IV  Informed Consent: Informed consent signed with the Patient and all parties understand the risks and agree with anesthesia plan.  All questions answered.   ASA Score: 2    Ready For Surgery From Anesthesia Perspective.     .

## 2023-05-11 VITALS
BODY MASS INDEX: 31.07 KG/M2 | SYSTOLIC BLOOD PRESSURE: 92 MMHG | WEIGHT: 205 LBS | HEART RATE: 58 BPM | RESPIRATION RATE: 18 BRPM | HEIGHT: 68 IN | DIASTOLIC BLOOD PRESSURE: 52 MMHG | OXYGEN SATURATION: 98 % | TEMPERATURE: 98 F

## 2023-05-12 LAB
FINAL PATHOLOGIC DIAGNOSIS: NORMAL
GROSS: NORMAL
Lab: NORMAL
MICROSCOPIC EXAM: NORMAL

## 2023-05-16 ENCOUNTER — PATIENT MESSAGE (OUTPATIENT)
Dept: RESEARCH | Facility: HOSPITAL | Age: 68
End: 2023-05-16
Payer: COMMERCIAL

## 2023-05-17 ENCOUNTER — HOSPITAL ENCOUNTER (OUTPATIENT)
Dept: RADIOLOGY | Facility: HOSPITAL | Age: 68
Discharge: HOME | End: 2023-05-17
Attending: NURSE PRACTITIONER
Payer: COMMERCIAL

## 2023-05-17 DIAGNOSIS — R10.11 RIGHT UPPER QUADRANT PAIN: ICD-10-CM

## 2023-05-17 PROCEDURE — 76700 US EXAM ABDOM COMPLETE: CPT | Mod: 26,,, | Performed by: RADIOLOGY

## 2023-05-17 PROCEDURE — 76700 US ABDOMEN COMPLETE: ICD-10-PCS | Mod: 26,,, | Performed by: RADIOLOGY

## 2023-05-17 PROCEDURE — 76700 US EXAM ABDOM COMPLETE: CPT | Mod: TC,PO

## 2023-05-18 ENCOUNTER — PATIENT MESSAGE (OUTPATIENT)
Dept: FAMILY MEDICINE | Facility: CLINIC | Age: 68
End: 2023-05-18
Payer: COMMERCIAL

## 2023-05-18 DIAGNOSIS — D17.71 ANGIOMYOLIPOMA OF RIGHT KIDNEY: Primary | ICD-10-CM

## 2023-05-19 ENCOUNTER — PATIENT MESSAGE (OUTPATIENT)
Dept: FAMILY MEDICINE | Facility: CLINIC | Age: 68
End: 2023-05-19
Payer: COMMERCIAL

## 2023-05-19 NOTE — TELEPHONE ENCOUNTER
Please inform patient that the US shows a small right kidney Angiomyolipoma, which is a condition in which benign tumors form in your kidney.  Fat, smooth muscle and blood vessels make up these tumors. They usually does not cause pain-unless it starts to grow-I have no previous image of the kidney to compare it too. I will refer her to urology since she is having right abdominal pain-there is nn other evidence of what could be causing her RUQ pain.    It shows liver steatosis which is fatty liver disease-I recommend a heart healthy diet rich in fiber, fresh vegetables and fruit and low in saturated fats (fried foods, red meat, etc.).  I also recommend regular exercise including a minimum of 150 minutes of cardio exercise per week and 2-30 minute workouts of strength training like light weights, yoga, pilates, etc.  You should work toward a body mass index of < 25 .

## 2023-05-22 ENCOUNTER — TELEPHONE (OUTPATIENT)
Dept: FAMILY MEDICINE | Facility: CLINIC | Age: 68
End: 2023-05-22
Payer: COMMERCIAL

## 2023-05-22 ENCOUNTER — PATIENT MESSAGE (OUTPATIENT)
Dept: FAMILY MEDICINE | Facility: CLINIC | Age: 68
End: 2023-05-22
Payer: COMMERCIAL

## 2023-05-23 ENCOUNTER — CLINICAL SUPPORT (OUTPATIENT)
Dept: DERMATOLOGY | Facility: CLINIC | Age: 68
End: 2023-05-23
Payer: COMMERCIAL

## 2023-05-23 ENCOUNTER — TELEPHONE (OUTPATIENT)
Dept: PODIATRY | Facility: CLINIC | Age: 68
End: 2023-05-23
Payer: COMMERCIAL

## 2023-05-23 DIAGNOSIS — L82.1 SEBORRHEIC KERATOSES: Primary | ICD-10-CM

## 2023-05-23 PROCEDURE — 99024 POSTOP FOLLOW-UP VISIT: CPT | Mod: S$GLB,,, | Performed by: STUDENT IN AN ORGANIZED HEALTH CARE EDUCATION/TRAINING PROGRAM

## 2023-05-23 PROCEDURE — 99024 PR POST-OP FOLLOW-UP VISIT: ICD-10-PCS | Mod: S$GLB,,, | Performed by: STUDENT IN AN ORGANIZED HEALTH CARE EDUCATION/TRAINING PROGRAM

## 2023-05-26 ENCOUNTER — OFFICE VISIT (OUTPATIENT)
Dept: UROLOGY | Facility: CLINIC | Age: 68
End: 2023-05-26
Payer: COMMERCIAL

## 2023-05-26 VITALS — HEIGHT: 68 IN | BODY MASS INDEX: 31.07 KG/M2 | WEIGHT: 205 LBS

## 2023-05-26 DIAGNOSIS — D17.71 ANGIOMYOLIPOMA OF RIGHT KIDNEY: ICD-10-CM

## 2023-05-26 PROCEDURE — 1160F PR REVIEW ALL MEDS BY PRESCRIBER/CLIN PHARMACIST DOCUMENTED: ICD-10-PCS | Mod: CPTII,S$GLB,, | Performed by: UROLOGY

## 2023-05-26 PROCEDURE — 1159F MED LIST DOCD IN RCRD: CPT | Mod: CPTII,S$GLB,, | Performed by: UROLOGY

## 2023-05-26 PROCEDURE — 3288F PR FALLS RISK ASSESSMENT DOCUMENTED: ICD-10-PCS | Mod: CPTII,S$GLB,, | Performed by: UROLOGY

## 2023-05-26 PROCEDURE — 99999 PR PBB SHADOW E&M-EST. PATIENT-LVL III: CPT | Mod: PBBFAC,,, | Performed by: UROLOGY

## 2023-05-26 PROCEDURE — 1160F RVW MEDS BY RX/DR IN RCRD: CPT | Mod: CPTII,S$GLB,, | Performed by: UROLOGY

## 2023-05-26 PROCEDURE — 1126F PR PAIN SEVERITY QUANTIFIED, NO PAIN PRESENT: ICD-10-PCS | Mod: CPTII,S$GLB,, | Performed by: UROLOGY

## 2023-05-26 PROCEDURE — 99203 OFFICE O/P NEW LOW 30 MIN: CPT | Mod: S$GLB,,, | Performed by: UROLOGY

## 2023-05-26 PROCEDURE — 3288F FALL RISK ASSESSMENT DOCD: CPT | Mod: CPTII,S$GLB,, | Performed by: UROLOGY

## 2023-05-26 PROCEDURE — 1159F PR MEDICATION LIST DOCUMENTED IN MEDICAL RECORD: ICD-10-PCS | Mod: CPTII,S$GLB,, | Performed by: UROLOGY

## 2023-05-26 PROCEDURE — 99203 PR OFFICE/OUTPT VISIT, NEW, LEVL III, 30-44 MIN: ICD-10-PCS | Mod: S$GLB,,, | Performed by: UROLOGY

## 2023-05-26 PROCEDURE — 3044F HG A1C LEVEL LT 7.0%: CPT | Mod: CPTII,S$GLB,, | Performed by: UROLOGY

## 2023-05-26 PROCEDURE — 99999 PR PBB SHADOW E&M-EST. PATIENT-LVL III: ICD-10-PCS | Mod: PBBFAC,,, | Performed by: UROLOGY

## 2023-05-26 PROCEDURE — 3044F PR MOST RECENT HEMOGLOBIN A1C LEVEL <7.0%: ICD-10-PCS | Mod: CPTII,S$GLB,, | Performed by: UROLOGY

## 2023-05-26 PROCEDURE — 1126F AMNT PAIN NOTED NONE PRSNT: CPT | Mod: CPTII,S$GLB,, | Performed by: UROLOGY

## 2023-05-26 PROCEDURE — 1101F PR PT FALLS ASSESS DOC 0-1 FALLS W/OUT INJ PAST YR: ICD-10-PCS | Mod: CPTII,S$GLB,, | Performed by: UROLOGY

## 2023-05-26 PROCEDURE — 1101F PT FALLS ASSESS-DOCD LE1/YR: CPT | Mod: CPTII,S$GLB,, | Performed by: UROLOGY

## 2023-05-26 PROCEDURE — 3008F PR BODY MASS INDEX (BMI) DOCUMENTED: ICD-10-PCS | Mod: CPTII,S$GLB,, | Performed by: UROLOGY

## 2023-05-26 PROCEDURE — 3008F BODY MASS INDEX DOCD: CPT | Mod: CPTII,S$GLB,, | Performed by: UROLOGY

## 2023-05-26 NOTE — PROGRESS NOTES
"[facility] New Patient/H&P:    Bailey Joseph is a 67 y.o. female who presents for right angiomyolipoma.    Patient underwent US abdomen on 5/17/23 for right upper quadrant abdominal pain. Incidentally found to have a 6 mm right renal angiomyolipoma. Referred to urology for evaluation.      She is s/p laparoscopic cholecystectomy on 1/16/23 with Dr. Salazar. She continues to have mild intermittent pain in the area of her incision.     No lower urinary tract symptoms.    Denies any fever, chills, gross hematuria, flank pain, bone pain, unintentional weight loss,  trauma or personal/family history of  malignancy.       History reviewed. No pertinent past medical history.    Past Surgical History:   Procedure Laterality Date    BREAST BIOPSY      COLONOSCOPY N/A 5/10/2023    Procedure: COLONOSCOPY;  Surgeon: Angelica Washburn MD;  Location: [facility];  Service: Endoscopy;  Laterality: N/A;    GALLBLADDER SURGERY N/A     LAPAROSCOPIC CHOLECYSTECTOMY N/A 01/16/2023    Procedure: CHOLECYSTECTOMY, LAPAROSCOPIC;  Surgeon: Jean-Claude Salazar III, MD;  Location: [facility] OR;  Service: General;  Laterality: N/A;    LIPOMA RESECTION      TONSILLECTOMY         Family History   Problem Relation Age of Onset    Diabetes Mother     Diabetes Sister     Diabetes Brother        Review of patient's allergies indicates:  No Known Allergies    Medications Reviewed: see MAR    PHYSICAL EXAM:    There were no vitals filed for this visit.  Body mass index is 31.17 kg/m². Weight: 93 kg (205 lb) Height: 5' 8" (172.7 cm)       General: Alert, cooperative, no distress, appears stated age  Abdomen: Soft, non-tender, no CVA tenderness, non-distended      LABS:    No results found for this or any previous visit (from the past 336 hour(s)).        Assessment/Diagnosis:    1. Angiomyolipoma of right kidney  Ambulatory referral/consult to Urology          Plans:    - I spent 30 minutes of the day of this encounter preparing for, " treating and managing this patient. Extensive discussion with patient regarding the etiology and management of her right AML measuring 6 mm. Explained that this is almost certainly not the cause of her right upper quadrant abdominal pain. No indication for any urologic intervention at this time. Follow up with general surgery.   - RTC as needed.

## 2023-05-31 ENCOUNTER — PATIENT MESSAGE (OUTPATIENT)
Dept: PODIATRY | Facility: CLINIC | Age: 68
End: 2023-05-31
Payer: COMMERCIAL

## 2023-05-31 ENCOUNTER — TELEPHONE (OUTPATIENT)
Dept: PODIATRY | Facility: CLINIC | Age: 68
End: 2023-05-31
Payer: COMMERCIAL

## 2023-05-31 ENCOUNTER — PATIENT MESSAGE (OUTPATIENT)
Dept: FAMILY MEDICINE | Facility: CLINIC | Age: 68
End: 2023-05-31
Payer: COMMERCIAL

## 2023-06-01 ENCOUNTER — PATIENT MESSAGE (OUTPATIENT)
Dept: SURGERY | Facility: CLINIC | Age: 68
End: 2023-06-01
Payer: COMMERCIAL

## 2023-06-01 NOTE — PATIENT INSTRUCTIONS

## 2023-06-02 ENCOUNTER — OFFICE VISIT (OUTPATIENT)
Dept: PODIATRY | Facility: CLINIC | Age: 68
End: 2023-06-02
Payer: COMMERCIAL

## 2023-06-02 ENCOUNTER — PATIENT MESSAGE (OUTPATIENT)
Dept: SURGERY | Facility: CLINIC | Age: 68
End: 2023-06-02
Payer: COMMERCIAL

## 2023-06-02 VITALS — WEIGHT: 205 LBS | BODY MASS INDEX: 31.07 KG/M2 | HEIGHT: 68 IN

## 2023-06-02 DIAGNOSIS — M72.2 PLANTAR FASCIITIS: Primary | ICD-10-CM

## 2023-06-02 DIAGNOSIS — M79.672 LEFT FOOT PAIN: ICD-10-CM

## 2023-06-02 PROCEDURE — 1159F MED LIST DOCD IN RCRD: CPT | Mod: CPTII,S$GLB,, | Performed by: PODIATRIST

## 2023-06-02 PROCEDURE — 1159F PR MEDICATION LIST DOCUMENTED IN MEDICAL RECORD: ICD-10-PCS | Mod: CPTII,S$GLB,, | Performed by: PODIATRIST

## 2023-06-02 PROCEDURE — 99999 PR PBB SHADOW E&M-EST. PATIENT-LVL III: CPT | Mod: PBBFAC,,, | Performed by: PODIATRIST

## 2023-06-02 PROCEDURE — 99213 PR OFFICE/OUTPT VISIT, EST, LEVL III, 20-29 MIN: ICD-10-PCS | Mod: 25,S$GLB,, | Performed by: PODIATRIST

## 2023-06-02 PROCEDURE — 1160F PR REVIEW ALL MEDS BY PRESCRIBER/CLIN PHARMACIST DOCUMENTED: ICD-10-PCS | Mod: CPTII,S$GLB,, | Performed by: PODIATRIST

## 2023-06-02 PROCEDURE — 99213 OFFICE O/P EST LOW 20 MIN: CPT | Mod: 25,S$GLB,, | Performed by: PODIATRIST

## 2023-06-02 PROCEDURE — 3044F PR MOST RECENT HEMOGLOBIN A1C LEVEL <7.0%: ICD-10-PCS | Mod: CPTII,S$GLB,, | Performed by: PODIATRIST

## 2023-06-02 PROCEDURE — 3288F FALL RISK ASSESSMENT DOCD: CPT | Mod: CPTII,S$GLB,, | Performed by: PODIATRIST

## 2023-06-02 PROCEDURE — 99999 PR PBB SHADOW E&M-EST. PATIENT-LVL III: ICD-10-PCS | Mod: PBBFAC,,, | Performed by: PODIATRIST

## 2023-06-02 PROCEDURE — 1101F PR PT FALLS ASSESS DOC 0-1 FALLS W/OUT INJ PAST YR: ICD-10-PCS | Mod: CPTII,S$GLB,, | Performed by: PODIATRIST

## 2023-06-02 PROCEDURE — 1101F PT FALLS ASSESS-DOCD LE1/YR: CPT | Mod: CPTII,S$GLB,, | Performed by: PODIATRIST

## 2023-06-02 PROCEDURE — 1160F RVW MEDS BY RX/DR IN RCRD: CPT | Mod: CPTII,S$GLB,, | Performed by: PODIATRIST

## 2023-06-02 PROCEDURE — 3044F HG A1C LEVEL LT 7.0%: CPT | Mod: CPTII,S$GLB,, | Performed by: PODIATRIST

## 2023-06-02 PROCEDURE — 20550 PR INJECT TENDON SHEATH/LIGAMENT: ICD-10-PCS | Mod: LT,S$GLB,, | Performed by: PODIATRIST

## 2023-06-02 PROCEDURE — 20550 NJX 1 TENDON SHEATH/LIGAMENT: CPT | Mod: LT,S$GLB,, | Performed by: PODIATRIST

## 2023-06-02 PROCEDURE — 3288F PR FALLS RISK ASSESSMENT DOCUMENTED: ICD-10-PCS | Mod: CPTII,S$GLB,, | Performed by: PODIATRIST

## 2023-06-02 PROCEDURE — 3008F PR BODY MASS INDEX (BMI) DOCUMENTED: ICD-10-PCS | Mod: CPTII,S$GLB,, | Performed by: PODIATRIST

## 2023-06-02 PROCEDURE — 1125F PR PAIN SEVERITY QUANTIFIED, PAIN PRESENT: ICD-10-PCS | Mod: CPTII,S$GLB,, | Performed by: PODIATRIST

## 2023-06-02 PROCEDURE — 3008F BODY MASS INDEX DOCD: CPT | Mod: CPTII,S$GLB,, | Performed by: PODIATRIST

## 2023-06-02 PROCEDURE — 1125F AMNT PAIN NOTED PAIN PRSNT: CPT | Mod: CPTII,S$GLB,, | Performed by: PODIATRIST

## 2023-06-02 RX ORDER — METHYLPREDNISOLONE ACETATE 40 MG/ML
20 INJECTION, SUSPENSION INTRA-ARTICULAR; INTRALESIONAL; INTRAMUSCULAR; SOFT TISSUE
Status: COMPLETED | OUTPATIENT
Start: 2023-06-02 | End: 2023-06-02

## 2023-06-02 RX ORDER — NAPROXEN 500 MG/1
500 TABLET ORAL 2 TIMES DAILY
Qty: 60 TABLET | Refills: 1 | Status: SHIPPED | OUTPATIENT
Start: 2023-06-02 | End: 2023-08-29

## 2023-06-02 RX ORDER — BUPIVACAINE HYDROCHLORIDE 5 MG/ML
1.5 INJECTION, SOLUTION PERINEURAL
Status: COMPLETED | OUTPATIENT
Start: 2023-06-02 | End: 2023-06-02

## 2023-06-02 RX ORDER — DEXAMETHASONE SODIUM PHOSPHATE 4 MG/ML
4 INJECTION, SOLUTION INTRA-ARTICULAR; INTRALESIONAL; INTRAMUSCULAR; INTRAVENOUS; SOFT TISSUE
Status: COMPLETED | OUTPATIENT
Start: 2023-06-02 | End: 2023-06-02

## 2023-06-02 RX ADMIN — BUPIVACAINE HYDROCHLORIDE 7.5 MG: 5 INJECTION, SOLUTION PERINEURAL at 01:06

## 2023-06-02 RX ADMIN — METHYLPREDNISOLONE ACETATE 20 MG: 40 INJECTION, SUSPENSION INTRA-ARTICULAR; INTRALESIONAL; INTRAMUSCULAR; SOFT TISSUE at 01:06

## 2023-06-02 RX ADMIN — DEXAMETHASONE SODIUM PHOSPHATE 4 MG: 4 INJECTION, SOLUTION INTRA-ARTICULAR; INTRALESIONAL; INTRAMUSCULAR; INTRAVENOUS; SOFT TISSUE at 01:06

## 2023-06-02 NOTE — PROGRESS NOTES
"  [address] [place] [address]  Phone: [phone]   [phone]    Patient's PCP:Primary Doctor No  Referring Provider: No ref. provider found    Subjective:      Chief Complaint:: Plantar Fasciitis (Mainly left heel)    JOHN Joseph is a 67 y.o. female who presents today for follow up PF.  C/O pain mainly in the left heel, although, can feel the pain in right heel starting up.  Presents in running shoes.        Vitals:    06/02/23 0841   Weight: 93 kg (205 lb)   Height: 5' 8" (1.727 m)   PainSc:   3      Shoe Size: 9    Past Surgical History:   Procedure Laterality Date    BREAST BIOPSY      COLONOSCOPY N/A 5/10/2023    Procedure: COLONOSCOPY;  Surgeon: Angelica Washburn MD;  Location: [facility];  Service: Endoscopy;  Laterality: N/A;    GALLBLADDER SURGERY N/A     LAPAROSCOPIC CHOLECYSTECTOMY N/A 01/16/2023    Procedure: CHOLECYSTECTOMY, LAPAROSCOPIC;  Surgeon: Jean-Claude Salazar III, MD;  Location: [facility] OR;  Service: General;  Laterality: N/A;    LIPOMA RESECTION      TONSILLECTOMY       History reviewed. No pertinent past medical history.  Family History   Problem Relation Age of Onset    Diabetes Mother     Diabetes Sister     Diabetes Brother         Social History:   Marital Status: Single  Alcohol History:  reports current alcohol use.  Tobacco History:  reports that she has never smoked. She has never used smokeless tobacco.  Drug History:  reports no history of drug use.    Review of patient's allergies indicates:  No Known Allergies    Current Outpatient Medications   Medication Sig Dispense Refill    famotidine (PEPCID) 20 MG tablet Take 1 tablet (20 mg total) by mouth 2 (two) times daily. 60 tablet 2    ferrous sulfate, dried (SLOW FE) 160 mg (50 mg iron) TbSR Take 160 mg by mouth once daily.      naproxen (NAPROSYN) 500 MG tablet Take 1 tablet (500 mg total) by mouth 2 (two) times daily. 60 tablet 1     No current facility-administered medications for this visit. "       Review of Systems   Constitutional:  Negative for chills, fatigue, fever and unexpected weight change.   HENT:  Negative for hearing loss and trouble swallowing.    Eyes:  Negative for photophobia and visual disturbance.   Respiratory:  Negative for cough, shortness of breath and wheezing.    Cardiovascular:  Negative for chest pain, palpitations and leg swelling.   Gastrointestinal:  Negative for abdominal pain and nausea.   Genitourinary:  Negative for dysuria and frequency.   Musculoskeletal:  Positive for gait problem. Negative for arthralgias, back pain, joint swelling and myalgias.   Skin:  Negative for rash and wound.   Neurological:  Negative for tremors, seizures, weakness, numbness and headaches.   Hematological:  Does not bruise/bleed easily.       Objective:        Physical Exam:   Foot Exam    General  General Appearance: appears stated age and healthy   Orientation: alert and oriented to person, place, and time   Affect: appropriate   Gait: unimpaired       Left Foot/Ankle      Inspection and Palpation  Ecchymosis: none  Tenderness: plantar fascia   Swelling: none   Arch: normal  Hammertoes: absent  Claw toes: absent  Hallux valgus: no  Hallux limitus: no  Skin Exam: skin intact; no drainage, no ulcer and no erythema   Neurovascular  Dorsalis pedis: 2+  Posterior tibial: 2+  Capillary refill: 2+  Varicose veins: not present  Saphenous nerve sensation: normal  Tibial nerve sensation: normal  Superficial peroneal nerve sensation: normal  Deep peroneal nerve sensation: normal  Sural nerve sensation: normal    Muscle Strength  Ankle dorsiflexion: 5  Ankle plantar flexion: 5  Ankle inversion: 5  Ankle eversion: 5  Great toe extension: 5  Great toe flexion: 5    Range of Motion    Normal left ankle ROM    Tests  Anterior drawer: negative   Talar tilt: negative   PT Tinel's sign: negative  Paresthesia: negative  Comments  Pain on palpation of the infeior medial heel. No pain present  with side to side  compression of the calcaneus. Negative tinnel's sign  at the tarsal tunnel. Negative Monroe's nerve pain. Negative Calcaneal nerve pain. No soft tissue masses. Pain absent  with dorsiflexion of the ankle. No edema, erythema, or ecchymosis noted.     Physical Exam  Cardiovascular:      Pulses:           Dorsalis pedis pulses are 2+ on the left side.        Posterior tibial pulses are 2+ on the left side.   Musculoskeletal:      Left foot: No bunion.   Feet:      Left foot:      Skin integrity: No ulcer or erythema.             Left Ankle/Foot Exam     Range of Motion   The patient has normal left ankle ROM.     Comments:  Pain on palpation of the infeior medial heel. No pain present  with side to side compression of the calcaneus. Negative tinnel's sign  at the tarsal tunnel. Negative Monroe's nerve pain. Negative Calcaneal nerve pain. No soft tissue masses. Pain absent  with dorsiflexion of the ankle. No edema, erythema, or ecchymosis noted.         Muscle Strength   Left Lower Extremity   Ankle Dorsiflexion:  5   Plantar flexion:  5/5     Vascular Exam       Left Pulses  Dorsalis Pedis:      2+  Posterior Tibial:      2+         Imaging: none            Assessment:       1. Plantar fasciitis    2. Left foot pain      Plan:   Plantar fasciitis  -     naproxen (NAPROSYN) 500 MG tablet; Take 1 tablet (500 mg total) by mouth 2 (two) times daily.  Dispense: 60 tablet; Refill: 1  -     methylPREDNISolone acetate injection 20 mg  -     BUPivacaine injection 7.5 mg  -     dexAMETHasone injection 4 mg    Left foot pain  -     naproxen (NAPROSYN) 500 MG tablet; Take 1 tablet (500 mg total) by mouth 2 (two) times daily.  Dispense: 60 tablet; Refill: 1  -     methylPREDNISolone acetate injection 20 mg  -     BUPivacaine injection 7.5 mg  -     dexAMETHasone injection 4 mg      Follow up if symptoms worsen or fail to improve.    Procedures Informed consent was obtained.  Time-out was called.  The area was prepped with alcohol,  and a steroid injection was performed at left plantar fascia using 1.5 cc of 0.5% Marcaine w/out epi, 1 cc Dexamethasone, 0.5 cc Methylprednisolone. Patient tolerated the procedure well.           Plantar Fasciitis Level I Treatment:   Anti-inflammatory  No bare feet  Over the counter insert  Restrict activity level   Use running shoes at full time  No impact exercise and stretch gastrocnemius muscle      Counseling:     I provided patient education verbally regarding:   Patient diagnosis, treatment options, as well as alternatives, risks, and benefits.     Discussed different treatment options for heel pain. I gave written and verbal instructions on heel cord stretching and this was demonstrated for the patient. Patient expressed understanding. Discussed wearing appropriate shoe gear and avoiding flats, slippers, sandals, barefoot, and sockfeet. Recommended arch supports. My recommendation for OTC supports is Spenco polysorb replacement insoles or patient may elect more aggressive treatment with prescription arch supports. We also discussed cortisone injections and NSAID therapy.       This note was created using Dragon voice recognition software that occasionally misinterpreted phrases or words.

## 2023-07-06 ENCOUNTER — OFFICE VISIT (OUTPATIENT)
Dept: SURGERY | Facility: CLINIC | Age: 68
End: 2023-07-06
Payer: COMMERCIAL

## 2023-07-06 VITALS
BODY MASS INDEX: 31.07 KG/M2 | DIASTOLIC BLOOD PRESSURE: 84 MMHG | SYSTOLIC BLOOD PRESSURE: 137 MMHG | RESPIRATION RATE: 16 BRPM | WEIGHT: 205 LBS | HEART RATE: 59 BPM | TEMPERATURE: 97 F | HEIGHT: 68 IN

## 2023-07-06 DIAGNOSIS — R10.11 ABDOMINAL PAIN, RIGHT UPPER QUADRANT: Primary | ICD-10-CM

## 2023-07-06 PROCEDURE — 99999 PR PBB SHADOW E&M-EST. PATIENT-LVL III: CPT | Mod: PBBFAC,,, | Performed by: SURGERY

## 2023-07-06 PROCEDURE — 1126F AMNT PAIN NOTED NONE PRSNT: CPT | Mod: CPTII,S$GLB,, | Performed by: SURGERY

## 2023-07-06 PROCEDURE — 3079F DIAST BP 80-89 MM HG: CPT | Mod: CPTII,S$GLB,, | Performed by: SURGERY

## 2023-07-06 PROCEDURE — 3075F SYST BP GE 130 - 139MM HG: CPT | Mod: CPTII,S$GLB,, | Performed by: SURGERY

## 2023-07-06 PROCEDURE — 99214 OFFICE O/P EST MOD 30 MIN: CPT | Mod: S$GLB,,, | Performed by: SURGERY

## 2023-07-06 PROCEDURE — 3008F BODY MASS INDEX DOCD: CPT | Mod: CPTII,S$GLB,, | Performed by: SURGERY

## 2023-07-06 PROCEDURE — 1160F RVW MEDS BY RX/DR IN RCRD: CPT | Mod: CPTII,S$GLB,, | Performed by: SURGERY

## 2023-07-06 PROCEDURE — 1159F MED LIST DOCD IN RCRD: CPT | Mod: CPTII,S$GLB,, | Performed by: SURGERY

## 2023-07-06 PROCEDURE — 3044F HG A1C LEVEL LT 7.0%: CPT | Mod: CPTII,S$GLB,, | Performed by: SURGERY

## 2023-07-06 NOTE — PROGRESS NOTES
Subjective:       Patient ID: Bailey Joseph is a 68 y.o. female.    Chief Complaint: post op     HPI:  S/p lap terell as inpatient for acute cholecystitis. Path benign. No feer no jaundice. Tolerating diet.     Review of Systems    Objective:      Physical Exam  Constitutional:       General: She is not in acute distress.  Pulmonary:      Effort: Pulmonary effort is normal. No respiratory distress.   Abdominal:      General: There is no distension.      Palpations: Abdomen is soft.      Tenderness: There is no abdominal tenderness. There is no guarding or rebound.   Skin:     Comments: Incisions are clean, dry and intact  There is no evidence of infection, hematoma or seroma    Neurological:      Mental Status: She is alert and oriented to person, place, and time.   Psychiatric:         Behavior: Behavior is cooperative.       Assessment/Plan:   Acute cholecystitis      S/p lap terell. Path benign. No sign of complication. RTC PRN

## 2023-07-08 ENCOUNTER — PATIENT MESSAGE (OUTPATIENT)
Dept: SURGERY | Facility: CLINIC | Age: 68
End: 2023-07-08
Payer: COMMERCIAL

## 2023-07-08 NOTE — PROGRESS NOTES
Subjective:       Patient ID: Bailey Joseph is a 68 y.o. female.    Chief Complaint:  Right upper quadrant pain after surgery    HPI:  Patient is status post laparoscopic cholecystectomy in January 20, 2023.  She reports continued discomfort over the subxiphoid incision.  No fevers or chills.  No jaundice.  No issues with postprandial discomfort.    No past medical history on file.  Past Surgical History:   Procedure Laterality Date    BREAST BIOPSY      COLONOSCOPY N/A 5/10/2023    Procedure: COLONOSCOPY;  Surgeon: Angelica Washburn MD;  Location: [facility];  Service: Endoscopy;  Laterality: N/A;    GALLBLADDER SURGERY N/A     LAPAROSCOPIC CHOLECYSTECTOMY N/A 01/16/2023    Procedure: CHOLECYSTECTOMY, LAPAROSCOPIC;  Surgeon: Jean-Claude Salazar III, MD;  Location: [facility];  Service: General;  Laterality: N/A;    LIPOMA RESECTION      TONSILLECTOMY       Review of patient's allergies indicates:  No Known Allergies  Medication List with Changes/Refills   Current Medications    FAMOTIDINE (PEPCID) 20 MG TABLET    Take 1 tablet (20 mg total) by mouth 2 (two) times daily.    FERROUS SULFATE, DRIED (SLOW FE) 160 MG (50 MG IRON) TBSR    Take 160 mg by mouth once daily.    NAPROXEN (NAPROSYN) 500 MG TABLET    Take 1 tablet (500 mg total) by mouth 2 (two) times daily.     Family History   Problem Relation Age of Onset    Diabetes Mother     Diabetes Sister     Diabetes Brother      Social History     Socioeconomic History    Marital status: Single   Tobacco Use    Smoking status: Never    Smokeless tobacco: Never   Substance and Sexual Activity    Alcohol use: Yes     Comment: occasional    Drug use: No    Sexual activity: Yes         Review of Systems   Constitutional:  Negative for appetite change, chills, fever and unexpected weight change.   HENT:  Negative for hearing loss, rhinorrhea, sore throat and voice change.    Eyes:  Negative for photophobia and visual disturbance.   Respiratory:  Negative for cough, choking and  shortness of breath.    Cardiovascular:  Negative for chest pain, palpitations and leg swelling.   Gastrointestinal:  Positive for abdominal pain. Negative for blood in stool, constipation, diarrhea, nausea and vomiting.   Endocrine: Negative for cold intolerance, heat intolerance, polydipsia and polyuria.   Musculoskeletal:  Negative for arthralgias, back pain, joint swelling and neck stiffness.   Skin:  Negative for color change, pallor and rash.   Neurological:  Negative for dizziness, seizures, syncope and headaches.   Hematological:  Negative for adenopathy. Does not bruise/bleed easily.   Psychiatric/Behavioral:  Negative for agitation, behavioral problems and confusion.      Objective:      Physical Exam  Constitutional:       General: She is not in acute distress.  HENT:      Head: Normocephalic and atraumatic.   Pulmonary:      Effort: Pulmonary effort is normal. No respiratory distress.   Abdominal:      General: A surgical scar is present. There is no distension.      Palpations: Abdomen is soft.      Tenderness: There is abdominal tenderness in the right upper quadrant. There is no guarding or rebound.          Comments: Point tender near the subxiphoid incision. Did not appreciate a hernia. No skin change. No fluctuance.    Musculoskeletal:      Cervical back: Neck supple.   Skin:     Comments: Incisions are clean, dry and intact  There is no evidence of infection, hematoma or seroma    Neurological:      Mental Status: She is alert and oriented to person, place, and time.   Psychiatric:         Behavior: Behavior is cooperative.       Assessment/Plan:   Diagnoses and all orders for this visit:    Abdominal pain, right upper quadrant  -     CT Abdomen With Contrast; Future  -     Creatinine, serum; Future          Point tender at the subxiphoid incision. I did not appreciate an obvious reason for the pain on exam. Will order CT to further evaluate. Follow up after CT.

## 2023-07-25 ENCOUNTER — HOSPITAL ENCOUNTER (OUTPATIENT)
Dept: RADIOLOGY | Facility: HOSPITAL | Age: 68
Discharge: HOME | End: 2023-07-25
Attending: SURGERY
Payer: COMMERCIAL

## 2023-07-25 DIAGNOSIS — R10.11 ABDOMINAL PAIN, RIGHT UPPER QUADRANT: ICD-10-CM

## 2023-07-25 LAB
CREAT SERPL-MCNC: 0.9 MG/DL (ref 0.5–1.4)
SAMPLE: NORMAL

## 2023-07-25 PROCEDURE — 74160 CT ABDOMEN W/CONTRAST: CPT | Mod: TC,PO

## 2023-07-25 PROCEDURE — 25500020 PHARM REV CODE 255: Mod: PO | Performed by: SURGERY

## 2023-07-25 RX ADMIN — IOHEXOL 100 ML: 350 INJECTION, SOLUTION INTRAVENOUS at 08:07

## 2023-07-28 ENCOUNTER — PATIENT MESSAGE (OUTPATIENT)
Dept: SURGERY | Facility: CLINIC | Age: 68
End: 2023-07-28
Payer: COMMERCIAL

## 2023-07-31 ENCOUNTER — TELEPHONE (OUTPATIENT)
Dept: SURGERY | Facility: CLINIC | Age: 68
End: 2023-07-31
Payer: COMMERCIAL

## 2023-07-31 NOTE — TELEPHONE ENCOUNTER
"Patient called this a.mm. and told nurse that she is still experiencing what she referred to as a "pulled muscle" in the RUQ area where Dr. Salazar did her L/S Abigail.  Patient stated it is not excrutiating pain but it is very uncomfortable.      Made a p/o-f/u appt for 8/8/23 in the morning.  Patient appreciated the return phone call.  "

## 2023-08-08 ENCOUNTER — TELEPHONE (OUTPATIENT)
Dept: SURGERY | Facility: CLINIC | Age: 68
End: 2023-08-08

## 2023-08-08 NOTE — TELEPHONE ENCOUNTER
----- Message from Vicki Luna sent at 8/8/2023  8:34 AM CDT -----  Contact: patient  Type: Needs Medical Advice  Who Called:  patient  Best Call Back Number: [phone] (home)   Additional Information: patient needs to reschedule her appt for today, please advise.

## 2023-08-29 ENCOUNTER — OFFICE VISIT (OUTPATIENT)
Dept: PODIATRY | Facility: CLINIC | Age: 68
End: 2023-08-29
Payer: COMMERCIAL

## 2023-08-29 VITALS — OXYGEN SATURATION: 95 % | HEIGHT: 68 IN | WEIGHT: 210.75 LBS | HEART RATE: 85 BPM | BODY MASS INDEX: 31.94 KG/M2

## 2023-08-29 DIAGNOSIS — M79.672 PAIN IN BOTH FEET: ICD-10-CM

## 2023-08-29 DIAGNOSIS — M79.671 PAIN IN BOTH FEET: ICD-10-CM

## 2023-08-29 DIAGNOSIS — M72.2 PLANTAR FASCIITIS: Primary | ICD-10-CM

## 2023-08-29 PROCEDURE — 1101F PR PT FALLS ASSESS DOC 0-1 FALLS W/OUT INJ PAST YR: ICD-10-PCS | Mod: CPTII,S$GLB,, | Performed by: PODIATRIST

## 2023-08-29 PROCEDURE — 20550 NJX 1 TENDON SHEATH/LIGAMENT: CPT | Mod: 50,S$GLB,, | Performed by: PODIATRIST

## 2023-08-29 PROCEDURE — 3008F PR BODY MASS INDEX (BMI) DOCUMENTED: ICD-10-PCS | Mod: CPTII,S$GLB,, | Performed by: PODIATRIST

## 2023-08-29 PROCEDURE — 99999 PR PBB SHADOW E&M-EST. PATIENT-LVL III: CPT | Mod: PBBFAC,,, | Performed by: PODIATRIST

## 2023-08-29 PROCEDURE — 1159F PR MEDICATION LIST DOCUMENTED IN MEDICAL RECORD: ICD-10-PCS | Mod: CPTII,S$GLB,, | Performed by: PODIATRIST

## 2023-08-29 PROCEDURE — 99213 OFFICE O/P EST LOW 20 MIN: CPT | Mod: 25,S$GLB,, | Performed by: PODIATRIST

## 2023-08-29 PROCEDURE — 1101F PT FALLS ASSESS-DOCD LE1/YR: CPT | Mod: CPTII,S$GLB,, | Performed by: PODIATRIST

## 2023-08-29 PROCEDURE — 99213 PR OFFICE/OUTPT VISIT, EST, LEVL III, 20-29 MIN: ICD-10-PCS | Mod: 25,S$GLB,, | Performed by: PODIATRIST

## 2023-08-29 PROCEDURE — 1160F RVW MEDS BY RX/DR IN RCRD: CPT | Mod: CPTII,S$GLB,, | Performed by: PODIATRIST

## 2023-08-29 PROCEDURE — 1159F MED LIST DOCD IN RCRD: CPT | Mod: CPTII,S$GLB,, | Performed by: PODIATRIST

## 2023-08-29 PROCEDURE — 3044F PR MOST RECENT HEMOGLOBIN A1C LEVEL <7.0%: ICD-10-PCS | Mod: CPTII,S$GLB,, | Performed by: PODIATRIST

## 2023-08-29 PROCEDURE — 1125F PR PAIN SEVERITY QUANTIFIED, PAIN PRESENT: ICD-10-PCS | Mod: CPTII,S$GLB,, | Performed by: PODIATRIST

## 2023-08-29 PROCEDURE — 99999 PR PBB SHADOW E&M-EST. PATIENT-LVL III: ICD-10-PCS | Mod: PBBFAC,,, | Performed by: PODIATRIST

## 2023-08-29 PROCEDURE — 3044F HG A1C LEVEL LT 7.0%: CPT | Mod: CPTII,S$GLB,, | Performed by: PODIATRIST

## 2023-08-29 PROCEDURE — 3008F BODY MASS INDEX DOCD: CPT | Mod: CPTII,S$GLB,, | Performed by: PODIATRIST

## 2023-08-29 PROCEDURE — 3288F PR FALLS RISK ASSESSMENT DOCUMENTED: ICD-10-PCS | Mod: CPTII,S$GLB,, | Performed by: PODIATRIST

## 2023-08-29 PROCEDURE — 1125F AMNT PAIN NOTED PAIN PRSNT: CPT | Mod: CPTII,S$GLB,, | Performed by: PODIATRIST

## 2023-08-29 PROCEDURE — 3288F FALL RISK ASSESSMENT DOCD: CPT | Mod: CPTII,S$GLB,, | Performed by: PODIATRIST

## 2023-08-29 PROCEDURE — 1160F PR REVIEW ALL MEDS BY PRESCRIBER/CLIN PHARMACIST DOCUMENTED: ICD-10-PCS | Mod: CPTII,S$GLB,, | Performed by: PODIATRIST

## 2023-08-29 PROCEDURE — 20550 PR INJECT TENDON SHEATH/LIGAMENT: ICD-10-PCS | Mod: 50,S$GLB,, | Performed by: PODIATRIST

## 2023-08-29 RX ORDER — DEXAMETHASONE SODIUM PHOSPHATE 4 MG/ML
4 INJECTION, SOLUTION INTRA-ARTICULAR; INTRALESIONAL; INTRAMUSCULAR; INTRAVENOUS; SOFT TISSUE
Status: COMPLETED | OUTPATIENT
Start: 2023-08-29 | End: 2023-08-29

## 2023-08-29 RX ORDER — BUPIVACAINE HYDROCHLORIDE 5 MG/ML
1.5 INJECTION, SOLUTION PERINEURAL
Status: COMPLETED | OUTPATIENT
Start: 2023-08-29 | End: 2023-08-29

## 2023-08-29 RX ORDER — METHYLPREDNISOLONE ACETATE 40 MG/ML
20 INJECTION, SUSPENSION INTRA-ARTICULAR; INTRALESIONAL; INTRAMUSCULAR; SOFT TISSUE
Status: COMPLETED | OUTPATIENT
Start: 2023-08-29 | End: 2023-08-29

## 2023-08-29 RX ADMIN — BUPIVACAINE HYDROCHLORIDE 7.5 MG: 5 INJECTION, SOLUTION PERINEURAL at 08:08

## 2023-08-29 RX ADMIN — METHYLPREDNISOLONE ACETATE 20 MG: 40 INJECTION, SUSPENSION INTRA-ARTICULAR; INTRALESIONAL; INTRAMUSCULAR; SOFT TISSUE at 08:08

## 2023-08-29 RX ADMIN — DEXAMETHASONE SODIUM PHOSPHATE 4 MG: 4 INJECTION, SOLUTION INTRA-ARTICULAR; INTRALESIONAL; INTRAMUSCULAR; INTRAVENOUS; SOFT TISSUE at 08:08

## 2023-08-29 NOTE — PROGRESS NOTES
"  [address] [place] [address]  Phone: [phone]   [phone]    Patient's PCP:No, Primary Doctor  Referring Provider: No ref. provider found    Subjective:      Chief Complaint:: Plantar Fasciitis (Bilateral heel pain)    JOHN Joseph is a 67 y.o. female who presents today with complaints of pain in her bilateral heels. She states the pain in mainly in her left heel. She states that her left heel pain had previously resolved, but returned several weeks ago. She denies any trauma.       Vitals:    08/29/23 1426   Pulse: 85   SpO2: 95%   Weight: 95.6 kg (210 lb 12.2 oz)   Height: 5' 8" (1.727 m)   PainSc:   9      Shoe Size: 9    Past Surgical History:   Procedure Laterality Date    BREAST BIOPSY      COLONOSCOPY N/A 5/10/2023    Procedure: COLONOSCOPY;  Surgeon: Angelica Washburn MD;  Location: [facility];  Service: Endoscopy;  Laterality: N/A;    GALLBLADDER SURGERY N/A     LAPAROSCOPIC CHOLECYSTECTOMY N/A 01/16/2023    Procedure: CHOLECYSTECTOMY, LAPAROSCOPIC;  Surgeon: Jean-Claude Salazar III, MD;  Location: [facility] OR;  Service: General;  Laterality: N/A;    LIPOMA RESECTION      TONSILLECTOMY       History reviewed. No pertinent past medical history.  Family History   Problem Relation Age of Onset    Diabetes Mother     Diabetes Sister     Diabetes Brother         Social History:   Marital Status: Single  Alcohol History:  reports current alcohol use.  Tobacco History:  reports that she has never smoked. She has never used smokeless tobacco.  Drug History:  reports no history of drug use.    Review of patient's allergies indicates:  No Known Allergies    Current Outpatient Medications   Medication Sig Dispense Refill    famotidine (PEPCID) 20 MG tablet Take 1 tablet (20 mg total) by mouth 2 (two) times daily. 60 tablet 2    ferrous sulfate, dried (SLOW FE) 160 mg (50 mg iron) TbSR Take 160 mg by mouth once daily.      naproxen (NAPROSYN) 500 MG tablet Take 1 tablet (500 mg total) " by mouth 2 (two) times daily. 60 tablet 1     No current facility-administered medications for this visit.       Review of Systems   Constitutional:  Negative for chills, fatigue, fever and unexpected weight change.   HENT:  Negative for hearing loss and trouble swallowing.    Eyes:  Negative for photophobia and visual disturbance.   Respiratory:  Negative for cough, shortness of breath and wheezing.    Cardiovascular:  Negative for chest pain, palpitations and leg swelling.   Gastrointestinal:  Negative for abdominal pain and nausea.   Genitourinary:  Negative for dysuria and frequency.   Musculoskeletal:  Negative for arthralgias, back pain, joint swelling and myalgias.   Skin:  Negative for rash and wound.   Neurological:  Negative for tremors, seizures, weakness, numbness and headaches.   Hematological:  Does not bruise/bleed easily.         Objective:        Physical Exam:   Foot Exam    General  General Appearance: appears stated age and healthy   Orientation: alert and oriented to person, place, and time   Affect: appropriate   Gait: antalgic       Right Foot/Ankle     Inspection and Palpation  Ecchymosis: none  Tenderness: plantar fascia   Swelling: none   Arch: normal  Hallux valgus: yes  Skin Exam: skin intact; no drainage, no ulcer and no erythema   Neurovascular  Dorsalis pedis: 2+  Posterior tibial: 2+  Capillary Refill: 2+  Varicose veins: not present  Saphenous nerve sensation: normal  Tibial nerve sensation: normal  Superficial peroneal nerve sensation: normal  Deep peroneal nerve sensation: normal  Sural nerve sensation: normal    Edema  Type of edema: non-pitting    Muscle Strength  Ankle dorsiflexion: 5  Ankle plantar flexion: 5  Ankle inversion: 5  Ankle eversion: 5  Great toe extension: 5  Great toe flexion: 5    Range of Motion    Passive  Ankle dorsiflexion: 5      Tests  Anterior drawer: negative   Talar tilt: negative   PT Tinel's sign: negative    Paresthesia: negative  Comments  Pain on  palpation of the infeior medial heel, central plantar heel and mid arch . Pain on palpation of the infeior medial heel and central plantar heel. No pain present  with side to side compression of the calcaneus. Negative tinnel's sign  at the tarsal tunnel. Negative Monroe's nerve pain. Negative Calcaneal nerve pain. No soft tissue masses. Pain absent  with dorsiflexion of the ankle. No edema, erythema, or ecchymosis noted.         Left Foot/Ankle      Inspection and Palpation  Ecchymosis: none  Tenderness: plantar fascia   Swelling: none   Arch: normal  Hallux valgus: yes  Skin Exam: skin intact; no drainage, no ulcer and no erythema   Neurovascular  Dorsalis pedis: 2+  Posterior tibial: 2+  Capillary refill: 2+  Varicose veins: not present  Saphenous nerve sensation: normal  Tibial nerve sensation: normal  Superficial peroneal nerve sensation: normal  Deep peroneal nerve sensation: normal  Sural nerve sensation: normal    Edema  Type of edema: non-pitting    Muscle Strength  Ankle dorsiflexion: 5  Ankle plantar flexion: 5  Ankle inversion: 5  Ankle eversion: 5  Great toe extension: 5  Great toe flexion: 5    Range of Motion    Passive  Ankle dorsiflexion: 5      Tests  Anterior drawer: negative   Talar tilt: negative   PT Tinel's sign: negative  Paresthesia: negative  Comments  Pain on palpation of the infeior medial heel, central plantar heel and mid arch . Pain on palpation of the infeior medial heel and central plantar heel. No pain present  with side to side compression of the calcaneus. Negative tinnel's sign  at the tarsal tunnel. Negative Monroe's nerve pain. Negative Calcaneal nerve pain. No soft tissue masses. Pain absent  with dorsiflexion of the ankle. No edema, erythema, or ecchymosis noted.       Physical Exam  Cardiovascular:      Pulses:           Dorsalis pedis pulses are 2+ on the right side and 2+ on the left side.        Posterior tibial pulses are 2+ on the right side and 2+ on the left side.    Musculoskeletal:      Right foot: Bunion present.      Left foot: Bunion present.   Feet:      Right foot:      Skin integrity: No ulcer or erythema.      Left foot:      Skin integrity: No ulcer or erythema.               Right Ankle/Foot Exam     Comments:  Pain on palpation of the infeior medial heel, central plantar heel and mid arch . Pain on palpation of the infeior medial heel and central plantar heel. No pain present  with side to side compression of the calcaneus. Negative tinnel's sign  at the tarsal tunnel. Negative Monroe's nerve pain. Negative Calcaneal nerve pain. No soft tissue masses. Pain absent  with dorsiflexion of the ankle. No edema, erythema, or ecchymosis noted.         Left Ankle/Foot Exam     Comments:  Pain on palpation of the infeior medial heel, central plantar heel and mid arch . Pain on palpation of the infeior medial heel and central plantar heel. No pain present  with side to side compression of the calcaneus. Negative tinnel's sign  at the tarsal tunnel. Negative Monroe's nerve pain. Negative Calcaneal nerve pain. No soft tissue masses. Pain absent  with dorsiflexion of the ankle. No edema, erythema, or ecchymosis noted.         Muscle Strength   Right Lower Extremity   Ankle Dorsiflexion:  5   Plantar flexion:  5/5  Left Lower Extremity   Ankle Dorsiflexion:  5   Plantar flexion:  5/5     Vascular Exam     Right Pulses  Dorsalis Pedis:      2+  Posterior Tibial:      2+        Left Pulses  Dorsalis Pedis:      2+  Posterior Tibial:      2+           Imaging: none            Assessment:       1. Plantar fasciitis    2. Pain in both feet      Plan:   Plantar fasciitis  -     methylPREDNISolone acetate injection 20 mg  -     BUPivacaine injection 7.5 mg  -     dexAMETHasone injection 4 mg  -     methylPREDNISolone acetate injection 20 mg  -     BUPivacaine injection 7.5 mg  -     dexAMETHasone injection 4 mg    Pain in both feet  -     methylPREDNISolone acetate injection 20 mg  -      BUPivacaine injection 7.5 mg  -     dexAMETHasone injection 4 mg  -     methylPREDNISolone acetate injection 20 mg  -     BUPivacaine injection 7.5 mg  -     dexAMETHasone injection 4 mg      Follow up if symptoms worsen or fail to improve.    Discussed different treatment options for heel pain. I gave written and verbal instructions on heel cord stretching and this was demonstrated for the patient. Patient expressed understanding. Discussed wearing appropriate shoe gear and avoiding flats, slippers, sandals, barefoot, and sockfeet. Recommended arch supports. My recommendation for OTC supports is Spenco polysorb replacement insoles or patient may elect more aggressive treatment with prescription arch supports. We also discussed cortisone injections and NSAID therapy.       Procedures Informed consent was obtained.  Time-out was called.  The area was prepped with alcohol, and a steroid injection was performed at left plantar fascia using 1.5 cc of 0.5% Marcaine w/out epi, 1 cc Dexamethasone, 0.5 cc Methylprednisolone. Patient tolerated the procedure well.       Informed consent was obtained.  Time-out was called.  The area was prepped with alcohol, and a steroid injection was performed at right plantar fascia using 1.5 cc of 0.5% Marcaine w/out epi, 1 cc Dexamethasone, 0.5 cc Methylprednisolone. Patient tolerated the procedure well.             Counseling:     I provided patient education verbally regarding:   Patient diagnosis, treatment options, as well as alternatives, risks, and benefits.     This note was created using Dragon voice recognition software that occasionally misinterpreted phrases or words.

## 2023-08-31 ENCOUNTER — OFFICE VISIT (OUTPATIENT)
Dept: SURGERY | Facility: CLINIC | Age: 68
End: 2023-08-31
Payer: COMMERCIAL

## 2023-08-31 VITALS
HEART RATE: 54 BPM | HEIGHT: 68 IN | DIASTOLIC BLOOD PRESSURE: 69 MMHG | SYSTOLIC BLOOD PRESSURE: 124 MMHG | WEIGHT: 210 LBS | BODY MASS INDEX: 31.83 KG/M2 | TEMPERATURE: 97 F

## 2023-08-31 DIAGNOSIS — R10.11 ABDOMINAL PAIN, RIGHT UPPER QUADRANT: Primary | ICD-10-CM

## 2023-08-31 PROCEDURE — 1125F AMNT PAIN NOTED PAIN PRSNT: CPT | Mod: CPTII,S$PBB,, | Performed by: SURGERY

## 2023-08-31 PROCEDURE — 3044F HG A1C LEVEL LT 7.0%: CPT | Mod: CPTII,S$PBB,, | Performed by: SURGERY

## 2023-08-31 PROCEDURE — 99213 OFFICE O/P EST LOW 20 MIN: CPT | Mod: S$PBB,,, | Performed by: SURGERY

## 2023-08-31 PROCEDURE — 99999 PR PBB SHADOW E&M-EST. PATIENT-LVL III: CPT | Mod: PBBFAC,,, | Performed by: SURGERY

## 2023-08-31 PROCEDURE — 3008F BODY MASS INDEX DOCD: CPT | Mod: CPTII,S$PBB,, | Performed by: SURGERY

## 2023-08-31 PROCEDURE — 3074F SYST BP LT 130 MM HG: CPT | Mod: CPTII,S$PBB,, | Performed by: SURGERY

## 2023-08-31 PROCEDURE — 3078F DIAST BP <80 MM HG: CPT | Mod: CPTII,S$PBB,, | Performed by: SURGERY

## 2023-08-31 NOTE — PROGRESS NOTES
Subjective:       Patient ID: Bailey Joseph is a 68 y.o. female.    Chief Complaint: Establish Care      HPI:  Patient returns for follow up. She is post op cholecystectomy in January. She has been complaining of point tenderness in the Right upper quadrant at the costal border. CT showed no evidence of intraabdominal or abdominal wall inflammation or abnormality. She reports no change in symptoms. Pain is mild but annoying. No fevers. No nausea.        CT ABDOMEN: The lung bases are clear.  The liver, spleen and pancreas demonstrate normal enhancement without focal mass or biliary duct dilatation.  Gallbladder is absent  Adrenal glands are normal  Kidneys enhance symmetrically without hydronephrosis or calculi.  There are no thick-walled or dilated bowel loops. There is no mesenteric or retroperitoneal adenopathy. The aorta is normal in caliber. The musculature is normal. There are no acute osseous abnormalities.    History reviewed. No pertinent past medical history.  Past Surgical History:   Procedure Laterality Date    BREAST BIOPSY      COLONOSCOPY N/A 5/10/2023    Procedure: COLONOSCOPY;  Surgeon: Angelica Washburn MD;  Location: [facility];  Service: Endoscopy;  Laterality: N/A;    GALLBLADDER SURGERY N/A     LAPAROSCOPIC CHOLECYSTECTOMY N/A 01/16/2023    Procedure: CHOLECYSTECTOMY, LAPAROSCOPIC;  Surgeon: Jean-Claude Salazar III, MD;  Location: [facility];  Service: General;  Laterality: N/A;    LIPOMA RESECTION      TONSILLECTOMY       Review of patient's allergies indicates:  No Known Allergies  Medication List with Changes/Refills   Current Medications    FAMOTIDINE (PEPCID) 20 MG TABLET    Take 1 tablet (20 mg total) by mouth 2 (two) times daily.    FERROUS SULFATE, DRIED (SLOW FE) 160 MG (50 MG IRON) TBSR    Take 160 mg by mouth once daily.     Family History   Problem Relation Age of Onset    Diabetes Mother     Diabetes Sister     Diabetes Brother      Social History     Socioeconomic History    Marital  status: Single   Tobacco Use    Smoking status: Never    Smokeless tobacco: Never   Substance and Sexual Activity    Alcohol use: Yes     Comment: occasional    Drug use: No    Sexual activity: Yes         Review of Systems   Constitutional:  Negative for appetite change, chills, fever and unexpected weight change.   HENT:  Negative for hearing loss, rhinorrhea, sore throat and voice change.    Eyes:  Negative for photophobia and visual disturbance.   Respiratory:  Negative for cough, choking and shortness of breath.    Cardiovascular:  Negative for chest pain, palpitations and leg swelling.   Gastrointestinal:  Positive for abdominal pain. Negative for blood in stool, constipation, diarrhea, nausea and vomiting.   Endocrine: Negative for cold intolerance, heat intolerance, polydipsia and polyuria.   Musculoskeletal:  Negative for arthralgias, back pain, joint swelling and neck stiffness.   Skin:  Negative for color change, pallor and rash.   Neurological:  Negative for dizziness, seizures, syncope and headaches.   Hematological:  Negative for adenopathy. Does not bruise/bleed easily.   Psychiatric/Behavioral:  Negative for agitation, behavioral problems and confusion.        Objective:      Physical Exam  Constitutional:       General: She is not in acute distress.  HENT:      Head: Normocephalic and atraumatic.   Pulmonary:      Effort: Pulmonary effort is normal. No respiratory distress.   Abdominal:      General: A surgical scar is present. There is no distension.      Palpations: Abdomen is soft.      Tenderness: There is abdominal tenderness in the right upper quadrant. There is no guarding or rebound.          Comments: Point tender near the subxiphoid incision. Did not appreciate a hernia. No skin change. No fluctuance.    Musculoskeletal:      Cervical back: Neck supple.   Skin:     Comments: Incisions are clean, dry and intact  There is no evidence of infection, hematoma or seroma    Neurological:       Mental Status: She is alert and oriented to person, place, and time.   Psychiatric:         Behavior: Behavior is cooperative.         Assessment/Plan:   Abdominal pain, right upper quadrant    Do not have a good explanation of the pain. No obvious evidence of infection, hernia, inflammation. Seems like pain may even be in the rib or intercostal location. I am comfortable with observation. Patient also comfortable with observation.

## 2023-09-02 DIAGNOSIS — K21.9 GASTROESOPHAGEAL REFLUX DISEASE, UNSPECIFIED WHETHER ESOPHAGITIS PRESENT: ICD-10-CM

## 2023-09-07 RX ORDER — FAMOTIDINE 20 MG/1
20 TABLET, FILM COATED ORAL 2 TIMES DAILY
Qty: 60 TABLET | Refills: 2 | Status: SHIPPED | OUTPATIENT
Start: 2023-09-07 | End: 2024-03-28

## 2023-10-03 ENCOUNTER — OFFICE VISIT (OUTPATIENT)
Dept: PODIATRY | Facility: CLINIC | Age: 68
End: 2023-10-03
Payer: COMMERCIAL

## 2023-10-03 VITALS — HEIGHT: 68 IN | BODY MASS INDEX: 31.28 KG/M2 | WEIGHT: 206.38 LBS

## 2023-10-03 DIAGNOSIS — M72.2 PLANTAR FASCIITIS: Primary | ICD-10-CM

## 2023-10-03 DIAGNOSIS — M79.672 LEFT FOOT PAIN: ICD-10-CM

## 2023-10-03 PROCEDURE — 99213 OFFICE O/P EST LOW 20 MIN: CPT | Mod: S$GLB,,, | Performed by: PODIATRIST

## 2023-10-03 PROCEDURE — 1159F PR MEDICATION LIST DOCUMENTED IN MEDICAL RECORD: ICD-10-PCS | Mod: CPTII,S$GLB,, | Performed by: PODIATRIST

## 2023-10-03 PROCEDURE — 3008F BODY MASS INDEX DOCD: CPT | Mod: CPTII,S$GLB,, | Performed by: PODIATRIST

## 2023-10-03 PROCEDURE — 1160F PR REVIEW ALL MEDS BY PRESCRIBER/CLIN PHARMACIST DOCUMENTED: ICD-10-PCS | Mod: CPTII,S$GLB,, | Performed by: PODIATRIST

## 2023-10-03 PROCEDURE — 1125F PR PAIN SEVERITY QUANTIFIED, PAIN PRESENT: ICD-10-PCS | Mod: CPTII,S$GLB,, | Performed by: PODIATRIST

## 2023-10-03 PROCEDURE — 1101F PR PT FALLS ASSESS DOC 0-1 FALLS W/OUT INJ PAST YR: ICD-10-PCS | Mod: CPTII,S$GLB,, | Performed by: PODIATRIST

## 2023-10-03 PROCEDURE — 3288F FALL RISK ASSESSMENT DOCD: CPT | Mod: CPTII,S$GLB,, | Performed by: PODIATRIST

## 2023-10-03 PROCEDURE — 3044F PR MOST RECENT HEMOGLOBIN A1C LEVEL <7.0%: ICD-10-PCS | Mod: CPTII,S$GLB,, | Performed by: PODIATRIST

## 2023-10-03 PROCEDURE — 1125F AMNT PAIN NOTED PAIN PRSNT: CPT | Mod: CPTII,S$GLB,, | Performed by: PODIATRIST

## 2023-10-03 PROCEDURE — 3044F HG A1C LEVEL LT 7.0%: CPT | Mod: CPTII,S$GLB,, | Performed by: PODIATRIST

## 2023-10-03 PROCEDURE — 1160F RVW MEDS BY RX/DR IN RCRD: CPT | Mod: CPTII,S$GLB,, | Performed by: PODIATRIST

## 2023-10-03 PROCEDURE — 99999 PR PBB SHADOW E&M-EST. PATIENT-LVL III: CPT | Mod: PBBFAC,,, | Performed by: PODIATRIST

## 2023-10-03 PROCEDURE — 99999 PR PBB SHADOW E&M-EST. PATIENT-LVL III: ICD-10-PCS | Mod: PBBFAC,,, | Performed by: PODIATRIST

## 2023-10-03 PROCEDURE — 1101F PT FALLS ASSESS-DOCD LE1/YR: CPT | Mod: CPTII,S$GLB,, | Performed by: PODIATRIST

## 2023-10-03 PROCEDURE — 3288F PR FALLS RISK ASSESSMENT DOCUMENTED: ICD-10-PCS | Mod: CPTII,S$GLB,, | Performed by: PODIATRIST

## 2023-10-03 PROCEDURE — 99213 PR OFFICE/OUTPT VISIT, EST, LEVL III, 20-29 MIN: ICD-10-PCS | Mod: S$GLB,,, | Performed by: PODIATRIST

## 2023-10-03 PROCEDURE — 3008F PR BODY MASS INDEX (BMI) DOCUMENTED: ICD-10-PCS | Mod: CPTII,S$GLB,, | Performed by: PODIATRIST

## 2023-10-03 PROCEDURE — 1159F MED LIST DOCD IN RCRD: CPT | Mod: CPTII,S$GLB,, | Performed by: PODIATRIST

## 2023-10-03 RX ORDER — METHYLPREDNISOLONE 4 MG/1
TABLET ORAL
Qty: 21 EACH | Refills: 0 | Status: SHIPPED | OUTPATIENT
Start: 2023-10-03 | End: 2023-10-24

## 2023-10-03 NOTE — PATIENT INSTRUCTIONS

## 2023-10-03 NOTE — PROGRESS NOTES
"  [address] [facility] [address] [place] [address]  Phone: [phone]   [phone]    Patient's PCP:Arlin, Primary Doctor  Referring Provider: Aaareferral Self    Subjective:      Chief Complaint:: Plantar Fasciitis (Follow up on left foot )    JOHN Joseph is a 68 y.o. female who presents today with a follow up on left foot. Right foot resolved from last shot however left still has swelling and pain.     Vitals:    10/03/23 0954   Weight: 93.6 kg (206 lb 5.6 oz)   Height: 5' 8" (1.727 m)   PainSc:   2      Shoe Size: 9-9.5    Past Surgical History:   Procedure Laterality Date    BREAST BIOPSY      COLONOSCOPY N/A 5/10/2023    Procedure: COLONOSCOPY;  Surgeon: Angelica Washburn MD;  Location: [facility];  Service: Endoscopy;  Laterality: N/A;    GALLBLADDER SURGERY N/A     LAPAROSCOPIC CHOLECYSTECTOMY N/A 01/16/2023    Procedure: CHOLECYSTECTOMY, LAPAROSCOPIC;  Surgeon: Jean-Claude Salazar III, MD;  Location: [facility];  Service: General;  Laterality: N/A;    LIPOMA RESECTION      TONSILLECTOMY       History reviewed. No pertinent past medical history.  Family History   Problem Relation Age of Onset    Diabetes Mother     Diabetes Sister     Diabetes Brother         Social History:   Marital Status: Single  Alcohol History:  reports current alcohol use.  Tobacco History:  reports that she has never smoked. She has never used smokeless tobacco.  Drug History:  reports no history of drug use.    Review of patient's allergies indicates:  No Known Allergies    Current Outpatient Medications   Medication Sig Dispense Refill    famotidine (PEPCID) 20 MG tablet Take 1 tablet (20 mg total) by mouth 2 (two) times daily. 60 tablet 2    ferrous sulfate, dried (SLOW FE) 160 mg (50 mg iron) TbSR Take 160 mg by mouth once daily.      methylPREDNISolone (MEDROL DOSEPACK) 4 mg tablet use as directed 21 each 0     No current facility-administered medications for this visit.       Review of Systems   Constitutional:  Negative " for chills, fatigue, fever and unexpected weight change.   HENT:  Negative for hearing loss and trouble swallowing.    Eyes:  Negative for photophobia and visual disturbance.   Respiratory:  Negative for cough, shortness of breath and wheezing.    Cardiovascular:  Negative for chest pain, palpitations and leg swelling.   Gastrointestinal:  Negative for abdominal pain and nausea.   Genitourinary:  Negative for dysuria and frequency.   Musculoskeletal:  Negative for arthralgias, back pain, joint swelling and myalgias.   Skin:  Negative for rash and wound.   Neurological:  Negative for tremors, seizures, weakness, numbness and headaches.   Hematological:  Does not bruise/bleed easily.         Objective:        Physical Exam:   Foot Exam    General  General Appearance: appears stated age and healthy   Orientation: alert and oriented to person, place, and time   Affect: appropriate   Gait: unimpaired       Left Foot/Ankle      Inspection and Palpation  Ecchymosis: none  Tenderness: plantar fascia   Swelling: none   Arch: normal  Hammertoes: absent  Claw toes: absent  Hallux valgus: no  Hallux limitus: no  Skin Exam: skin intact; no drainage, no ulcer and no erythema   Neurovascular  Dorsalis pedis: 2+  Posterior tibial: 2+  Capillary refill: 2+  Varicose veins: not present  Saphenous nerve sensation: normal  Tibial nerve sensation: normal  Superficial peroneal nerve sensation: normal  Deep peroneal nerve sensation: normal  Sural nerve sensation: normal    Muscle Strength  Ankle dorsiflexion: 5  Ankle plantar flexion: 5  Ankle inversion: 5  Ankle eversion: 5  Great toe extension: 5  Great toe flexion: 5    Range of Motion    Normal left ankle ROM    Tests  Anterior drawer: negative   Talar tilt: negative   PT Tinel's sign: negative  Paresthesia: negative  Comments  Pain on palpation of the infeior medial heel. No pain present  with side to side compression of the calcaneus. Negative tinnel's sign  at the tarsal tunnel.  Negative Monroe's nerve pain. Negative Calcaneal nerve pain. No soft tissue masses. Pain absent  with dorsiflexion of the ankle. No edema, erythema, or ecchymosis noted.       Physical Exam  Cardiovascular:      Pulses:           Dorsalis pedis pulses are 2+ on the left side.        Posterior tibial pulses are 2+ on the left side.   Musculoskeletal:      Left foot: No bunion.   Feet:      Left foot:      Skin integrity: No ulcer or erythema.               Left Ankle/Foot Exam     Range of Motion   The patient has normal left ankle ROM.     Comments:  Pain on palpation of the infeior medial heel. No pain present  with side to side compression of the calcaneus. Negative tinnel's sign  at the tarsal tunnel. Negative Monroe's nerve pain. Negative Calcaneal nerve pain. No soft tissue masses. Pain absent  with dorsiflexion of the ankle. No edema, erythema, or ecchymosis noted.         Muscle Strength   Left Lower Extremity   Ankle Dorsiflexion:  5   Plantar flexion:  5/5     Vascular Exam       Left Pulses  Dorsalis Pedis:      2+  Posterior Tibial:      2+           Imaging: none            Assessment:       1. Plantar fasciitis    2. Left foot pain      Plan:   Plantar fasciitis  -     methylPREDNISolone (MEDROL DOSEPACK) 4 mg tablet; use as directed  Dispense: 21 each; Refill: 0    Left foot pain  -     methylPREDNISolone (MEDROL DOSEPACK) 4 mg tablet; use as directed  Dispense: 21 each; Refill: 0      Follow up if symptoms worsen or fail to improve.    Procedures        We discussed different ongoing treatment options for her left foot plantar fasciitis.  At this time I am going to send in a Medrol Dosepak for her.  I discussed with her that if her symptoms are not improving with Medrol Dosepak then I am likely recommend physical therapy as a next step    Counseling:     I provided patient education verbally regarding:   Patient diagnosis, treatment options, as well as alternatives, risks, and benefits.     This note  was created using Dragon voice recognition software that occasionally misinterpreted phrases or words.

## 2023-11-08 ENCOUNTER — PATIENT MESSAGE (OUTPATIENT)
Dept: PODIATRY | Facility: CLINIC | Age: 68
End: 2023-11-08
Payer: COMMERCIAL

## 2023-11-08 ENCOUNTER — PATIENT MESSAGE (OUTPATIENT)
Dept: ADMINISTRATIVE | Facility: OTHER | Age: 68
End: 2023-11-08
Payer: COMMERCIAL

## 2023-11-14 ENCOUNTER — TELEPHONE (OUTPATIENT)
Dept: PODIATRY | Facility: CLINIC | Age: 68
End: 2023-11-14
Payer: MEDICARE

## 2023-11-14 NOTE — TELEPHONE ENCOUNTER
"----- Message from Mino Rosenberg DPM sent at 11/14/2023  1:39 PM CST -----  We can try another shot in the foot if she does not want to do PT  ----- Message -----  From: Lora Frazier  Sent: 11/13/2023   3:10 PM CST  To: Mino Rosenberg DPM    Pt is still having trouble with her left foot,  with the PF so  "TESSA" said, if she is still having trouble with her foot then possibly Physical Therapy or have an MRI done. She has done the steroid pack. Pt wants to know if it would be more cost effective for PT or another shot in her foot.   Please call the patient and advise.     Thank you, Lora      "

## 2023-11-15 ENCOUNTER — OFFICE VISIT (OUTPATIENT)
Dept: PODIATRY | Facility: CLINIC | Age: 68
End: 2023-11-15
Payer: MEDICARE

## 2023-11-15 VITALS — BODY MASS INDEX: 30.8 KG/M2 | WEIGHT: 203.25 LBS | HEIGHT: 68 IN

## 2023-11-15 DIAGNOSIS — M72.2 PLANTAR FASCIITIS: Primary | ICD-10-CM

## 2023-11-15 DIAGNOSIS — M79.672 LEFT FOOT PAIN: ICD-10-CM

## 2023-11-15 PROCEDURE — 99999 PR PBB SHADOW E&M-EST. PATIENT-LVL III: CPT | Mod: PBBFAC,,, | Performed by: PODIATRIST

## 2023-11-15 PROCEDURE — 99999PBSHW PR PBB SHADOW TECHNICAL ONLY FILED TO HB: ICD-10-PCS | Mod: PBBFAC,,,

## 2023-11-15 PROCEDURE — 20550 PR INJECT TENDON SHEATH/LIGAMENT: ICD-10-PCS | Mod: S$PBB,LT,, | Performed by: PODIATRIST

## 2023-11-15 PROCEDURE — 99999 PR PBB SHADOW E&M-EST. PATIENT-LVL III: ICD-10-PCS | Mod: PBBFAC,,, | Performed by: PODIATRIST

## 2023-11-15 PROCEDURE — 99499 NO LOS: ICD-10-PCS | Mod: S$PBB,,, | Performed by: PODIATRIST

## 2023-11-15 PROCEDURE — 99999PBSHW PR PBB SHADOW TECHNICAL ONLY FILED TO HB: Mod: PBBFAC,,,

## 2023-11-15 PROCEDURE — 20550 NJX 1 TENDON SHEATH/LIGAMENT: CPT | Mod: PBBFAC,PN | Performed by: PODIATRIST

## 2023-11-15 PROCEDURE — 99499 UNLISTED E&M SERVICE: CPT | Mod: S$PBB,,, | Performed by: PODIATRIST

## 2023-11-15 PROCEDURE — 99213 OFFICE O/P EST LOW 20 MIN: CPT | Mod: PBBFAC,PN | Performed by: PODIATRIST

## 2023-11-15 PROCEDURE — 20550 NJX 1 TENDON SHEATH/LIGAMENT: CPT | Mod: S$PBB,LT,, | Performed by: PODIATRIST

## 2023-11-15 RX ORDER — METHYLPREDNISOLONE ACETATE 40 MG/ML
20 INJECTION, SUSPENSION INTRA-ARTICULAR; INTRALESIONAL; INTRAMUSCULAR; SOFT TISSUE
Status: COMPLETED | OUTPATIENT
Start: 2023-11-15 | End: 2023-11-15

## 2023-11-15 RX ORDER — BUPIVACAINE HYDROCHLORIDE 5 MG/ML
1.5 INJECTION, SOLUTION PERINEURAL
Status: COMPLETED | OUTPATIENT
Start: 2023-11-15 | End: 2023-11-15

## 2023-11-15 RX ORDER — DEXAMETHASONE SODIUM PHOSPHATE 4 MG/ML
4 INJECTION, SOLUTION INTRA-ARTICULAR; INTRALESIONAL; INTRAMUSCULAR; INTRAVENOUS; SOFT TISSUE
Status: COMPLETED | OUTPATIENT
Start: 2023-11-15 | End: 2023-11-15

## 2023-11-15 RX ADMIN — BUPIVACAINE HYDROCHLORIDE 7.5 MG: 5 INJECTION, SOLUTION PERINEURAL at 12:11

## 2023-11-15 RX ADMIN — METHYLPREDNISOLONE ACETATE 20 MG: 40 INJECTION, SUSPENSION INTRA-ARTICULAR; INTRALESIONAL; INTRAMUSCULAR; INTRASYNOVIAL; SOFT TISSUE at 12:11

## 2023-11-15 RX ADMIN — DEXAMETHASONE SODIUM PHOSPHATE 4 MG: 4 INJECTION INTRA-ARTICULAR; INTRALESIONAL; INTRAMUSCULAR; INTRAVENOUS; SOFT TISSUE at 12:11

## 2023-11-15 NOTE — PROGRESS NOTES
"  [address] [facility] [address] [place] [address]  Phone: [phone]   [phone]    Patient's PCP:No, Primary Doctor  Referring Provider: No ref. provider found    Subjective:      Chief Complaint:: Plantar Fasciitis (Left heel )    JOHN Joseph is a 68 y.o. female who presents today with a complaint of left heel pain . The current episode started unresolved from last visit.  Pt would like another injection for plantar fasciitis.       Vitals:    11/15/23 1036   Weight: 92.2 kg (203 lb 4.2 oz)   Height: 5' 8" (1.727 m)   PainSc:   8      Shoe Size: 9-9.5    Past Surgical History:   Procedure Laterality Date    BREAST BIOPSY      COLONOSCOPY N/A 5/10/2023    Procedure: COLONOSCOPY;  Surgeon: Angelica Washburn MD;  Location: [facility];  Service: Endoscopy;  Laterality: N/A;    GALLBLADDER SURGERY N/A     LAPAROSCOPIC CHOLECYSTECTOMY N/A 01/16/2023    Procedure: CHOLECYSTECTOMY, LAPAROSCOPIC;  Surgeon: Jean-Claude Salazar III, MD;  Location: [facility] OR;  Service: General;  Laterality: N/A;    LIPOMA RESECTION      TONSILLECTOMY       History reviewed. No pertinent past medical history.  Family History   Problem Relation Age of Onset    Diabetes Mother     Diabetes Sister     Diabetes Brother         Social History:   Marital Status: Single  Alcohol History:  reports current alcohol use.  Tobacco History:  reports that she has never smoked. She has never used smokeless tobacco.  Drug History:  reports no history of drug use.    Review of patient's allergies indicates:  No Known Allergies    Current Outpatient Medications   Medication Sig Dispense Refill    famotidine (PEPCID) 20 MG tablet Take 1 tablet (20 mg total) by mouth 2 (two) times daily. 60 tablet 2    ferrous sulfate, dried (SLOW FE) 160 mg (50 mg iron) TbSR Take 160 mg by mouth once daily.       No current facility-administered medications for this visit.       Review of Systems   Constitutional:  Negative for chills, fatigue, fever and unexpected " weight change.   HENT:  Negative for hearing loss and trouble swallowing.    Eyes:  Negative for photophobia and visual disturbance.   Respiratory:  Negative for cough, shortness of breath and wheezing.    Cardiovascular:  Negative for chest pain, palpitations and leg swelling.   Gastrointestinal:  Negative for abdominal pain and nausea.   Genitourinary:  Negative for dysuria and frequency.   Musculoskeletal:  Negative for arthralgias, back pain, joint swelling and myalgias.   Skin:  Negative for rash and wound.   Neurological:  Negative for tremors, seizures, weakness, numbness and headaches.   Hematological:  Does not bruise/bleed easily.         Objective:        Physical Exam:   Foot Exam    General  General Appearance: appears stated age and healthy   Orientation: alert and oriented to person, place, and time   Affect: appropriate   Gait: unimpaired       Left Foot/Ankle      Inspection and Palpation  Ecchymosis: none  Tenderness: plantar fascia   Swelling: none   Arch: normal  Hammertoes: absent  Claw toes: absent  Hallux valgus: no  Hallux limitus: no  Skin Exam: skin intact; no drainage, no ulcer and no erythema   Neurovascular  Dorsalis pedis: 2+  Posterior tibial: 2+  Capillary refill: 2+  Varicose veins: not present  Saphenous nerve sensation: normal  Tibial nerve sensation: normal  Superficial peroneal nerve sensation: normal  Deep peroneal nerve sensation: normal  Sural nerve sensation: normal    Muscle Strength  Ankle dorsiflexion: 5  Ankle plantar flexion: 5  Ankle inversion: 5  Ankle eversion: 5  Great toe extension: 5  Great toe flexion: 5    Range of Motion    Normal left ankle ROM    Tests  Anterior drawer: negative   Talar tilt: negative   PT Tinel's sign: negative  Paresthesia: negative  Comments  Pain on palpation of the infeior medial heel. No pain present  with side to side compression of the calcaneus. Negative tinnel's sign  at the tarsal tunnel. Negative Monroe's nerve pain. Negative  Calcaneal nerve pain. No soft tissue masses. Pain absent  with dorsiflexion of the ankle. No edema, erythema, or ecchymosis noted.       Physical Exam  Cardiovascular:      Pulses:           Dorsalis pedis pulses are 2+ on the left side.        Posterior tibial pulses are 2+ on the left side.   Musculoskeletal:      Left foot: No bunion.   Feet:      Left foot:      Skin integrity: No ulcer or erythema.               Left Ankle/Foot Exam     Range of Motion   The patient has normal left ankle ROM.     Comments:  Pain on palpation of the infeior medial heel. No pain present  with side to side compression of the calcaneus. Negative tinnel's sign  at the tarsal tunnel. Negative Monroe's nerve pain. Negative Calcaneal nerve pain. No soft tissue masses. Pain absent  with dorsiflexion of the ankle. No edema, erythema, or ecchymosis noted.         Muscle Strength   Left Lower Extremity   Ankle Dorsiflexion:  5   Plantar flexion:  5/5     Vascular Exam       Left Pulses  Dorsalis Pedis:      2+  Posterior Tibial:      2+           Imaging: none            Assessment:       1. Plantar fasciitis    2. Left foot pain      Plan:   Plantar fasciitis  -     methylPREDNISolone acetate injection 20 mg  -     BUPivacaine injection 7.5 mg  -     dexAMETHasone injection 4 mg    Left foot pain  -     methylPREDNISolone acetate injection 20 mg  -     BUPivacaine injection 7.5 mg  -     dexAMETHasone injection 4 mg      Follow up if symptoms worsen or fail to improve.    Procedures Informed consent was obtained.  Time-out was called.  The area was prepped with alcohol, and a steroid injection was performed at left plantar fascia using 1.5 cc of 0.5% Marcaine w/out epi, 1 cc Dexamethasone, 0.5 cc Methylprednisolone. Patient tolerated the procedure well.           Discussed different treatment options for heel pain. I gave written and verbal instructions on heel cord stretching and this was demonstrated for the patient. Patient expressed  understanding. Discussed wearing appropriate shoe gear and avoiding flats, slippers, sandals, barefoot, and sockfeet. Recommended arch supports. My recommendation for OTC supports is Spenco polysorb replacement insoles or patient may elect more aggressive treatment with prescription arch supports. We also discussed cortisone injections and NSAID therapy.       Counseling:     I provided patient education verbally regarding:   Patient diagnosis, treatment options, as well as alternatives, risks, and benefits.     This note was created using Dragon voice recognition software that occasionally misinterpreted phrases or words.

## 2023-12-28 ENCOUNTER — OFFICE VISIT (OUTPATIENT)
Dept: FAMILY MEDICINE | Facility: CLINIC | Age: 68
End: 2023-12-28
Payer: MEDICARE

## 2023-12-28 VITALS
DIASTOLIC BLOOD PRESSURE: 76 MMHG | RESPIRATION RATE: 18 BRPM | HEIGHT: 68 IN | HEART RATE: 78 BPM | BODY MASS INDEX: 30.84 KG/M2 | OXYGEN SATURATION: 97 % | SYSTOLIC BLOOD PRESSURE: 134 MMHG | WEIGHT: 203.5 LBS

## 2023-12-28 DIAGNOSIS — H66.93 BILATERAL OTITIS MEDIA, UNSPECIFIED OTITIS MEDIA TYPE: Primary | ICD-10-CM

## 2023-12-28 DIAGNOSIS — J01.00 ACUTE NON-RECURRENT MAXILLARY SINUSITIS: ICD-10-CM

## 2023-12-28 DIAGNOSIS — R05.1 ACUTE COUGH: ICD-10-CM

## 2023-12-28 PROCEDURE — 99214 OFFICE O/P EST MOD 30 MIN: CPT | Mod: S$PBB,,, | Performed by: STUDENT IN AN ORGANIZED HEALTH CARE EDUCATION/TRAINING PROGRAM

## 2023-12-28 PROCEDURE — 99999 PR PBB SHADOW E&M-EST. PATIENT-LVL IV: ICD-10-PCS | Mod: PBBFAC,,, | Performed by: STUDENT IN AN ORGANIZED HEALTH CARE EDUCATION/TRAINING PROGRAM

## 2023-12-28 PROCEDURE — 99214 PR OFFICE/OUTPT VISIT, EST, LEVL IV, 30-39 MIN: ICD-10-PCS | Mod: S$PBB,,, | Performed by: STUDENT IN AN ORGANIZED HEALTH CARE EDUCATION/TRAINING PROGRAM

## 2023-12-28 PROCEDURE — 99999 PR PBB SHADOW E&M-EST. PATIENT-LVL IV: CPT | Mod: PBBFAC,,, | Performed by: STUDENT IN AN ORGANIZED HEALTH CARE EDUCATION/TRAINING PROGRAM

## 2023-12-28 PROCEDURE — 99214 OFFICE O/P EST MOD 30 MIN: CPT | Mod: PBBFAC,PO | Performed by: STUDENT IN AN ORGANIZED HEALTH CARE EDUCATION/TRAINING PROGRAM

## 2023-12-28 RX ORDER — BENZONATATE 100 MG/1
100 CAPSULE ORAL 3 TIMES DAILY PRN
Qty: 30 CAPSULE | Refills: 0 | Status: SHIPPED | OUTPATIENT
Start: 2023-12-28 | End: 2024-01-07

## 2023-12-28 RX ORDER — FLUTICASONE PROPIONATE 50 MCG
1 SPRAY, SUSPENSION (ML) NASAL DAILY
Qty: 18.2 ML | Refills: 0 | Status: SHIPPED | OUTPATIENT
Start: 2023-12-28 | End: 2024-03-28

## 2023-12-28 RX ORDER — AMOXICILLIN AND CLAVULANATE POTASSIUM 875; 125 MG/1; MG/1
1 TABLET, FILM COATED ORAL 2 TIMES DAILY
Qty: 14 TABLET | Refills: 0 | Status: SHIPPED | OUTPATIENT
Start: 2023-12-28 | End: 2024-01-04

## 2023-12-28 NOTE — PATIENT INSTRUCTIONS
Please also try nasal irrigation for your nasal congestion along the nasal spray I am giving you.    Please take probiotics while you take your antibiotics

## 2023-12-28 NOTE — PROGRESS NOTES
"  [facility]   OFFICE VISIT NOTE    Patient Name: Bailey Joseph  YOB: 1955    PRESENTING HISTORY     History of Present Illness:  Ms. Bailey Joseph is a 68 y.o. female coming in for cough, otalgia of bilateral ears, nasal congestion along with rhinorrhea, sore throat, headache.  Denies having headache.   Patient has been using mucinex and theraflu.   Has not been utilizing any nasal spray.   She notices that discharge from her nose appears green.   Hard to eat due to congestion but she has been trying to hydrate for the last couple of days       Review of Systems   Constitutional:  Negative for appetite change, chills, fatigue and fever.   HENT:  Positive for nasal congestion, ear pain, rhinorrhea, sinus pressure/congestion and sore throat. Negative for ear discharge.    Eyes:  Negative for pain and discharge.   Respiratory:  Positive for cough. Negative for shortness of breath.    Cardiovascular:  Negative for chest pain and palpitations.          OBJECTIVE:   Vital Signs:  Vitals:    12/28/23 0848   BP: 134/76   Pulse: 78   Resp: 18   SpO2: 97%   Weight: 92.3 kg (203 lb 7.8 oz)   Height: 5' 8" (1.727 m)           Physical Exam  Constitutional:       General: She is not in acute distress.     Appearance: She is not ill-appearing or toxic-appearing.   HENT:      Head: Normocephalic and atraumatic.      Comments: Sinus pressure on frontal and maxillary when bending forward      Right Ear: Hearing normal. Tenderness present. A middle ear effusion is present. Tympanic membrane is injected.      Left Ear: Hearing normal. Tenderness present. A middle ear effusion is present. Tympanic membrane is injected.      Mouth/Throat:      Mouth: Mucous membranes are moist.      Pharynx: Uvula midline. No pharyngeal swelling, oropharyngeal exudate, posterior oropharyngeal erythema or uvula swelling.      Tonsils: No tonsillar exudate or tonsillar abscesses.   Cardiovascular:      Rate and Rhythm: " Normal rate and regular rhythm.   Pulmonary:      Effort: Pulmonary effort is normal. No tachypnea, bradypnea, accessory muscle usage, prolonged expiration or respiratory distress.      Breath sounds: Normal breath sounds. No stridor. No wheezing, rhonchi or rales.   Neurological:      General: No focal deficit present.      Mental Status: She is alert.   Psychiatric:         Mood and Affect: Mood normal.         Behavior: Behavior normal.         ASSESSMENT & PLAN:     Bilateral otitis media, unspecified otitis media type  -     amoxicillin-clavulanate 875-125mg (AUGMENTIN) 875-125 mg per tablet; Take 1 tablet by mouth 2 (two) times daily. for 7 days  Dispense: 14 tablet; Refill: 0    Acute non-recurrent maxillary sinusitis  -     amoxicillin-clavulanate 875-125mg (AUGMENTIN) 875-125 mg per tablet; Take 1 tablet by mouth 2 (two) times daily. for 7 days  Dispense: 14 tablet; Refill: 0  -     fluticasone propionate (FLONASE) 50 mcg/actuation nasal spray; 1 spray (50 mcg total) by Each Nostril route once daily.  Dispense: 18.2 mL; Refill: 0    Acute cough    Other orders  -     benzonatate (TESSALON) 100 MG capsule; Take 1 capsule (100 mg total) by mouth 3 (three) times daily as needed for Cough.  Dispense: 30 capsule; Refill: 0    Patient appears to have bilateral OM as well as acute sinusitis   Will place her on Augmentin DS BID for the next week   Advised to return if no improvement   Follow up visit to establish care in 3 months      Jackie Alcaraz MD  Family Medicine  [facility]     This note was created using [employer] voice recognition software that occasionally misinterprets phrases or words

## 2024-01-02 ENCOUNTER — PATIENT MESSAGE (OUTPATIENT)
Dept: FAMILY MEDICINE | Facility: CLINIC | Age: 69
End: 2024-01-02
Payer: MEDICARE

## 2024-01-02 DIAGNOSIS — J01.00 ACUTE NON-RECURRENT MAXILLARY SINUSITIS: ICD-10-CM

## 2024-01-02 DIAGNOSIS — H66.93 BILATERAL OTITIS MEDIA, UNSPECIFIED OTITIS MEDIA TYPE: Primary | ICD-10-CM

## 2024-01-02 RX ORDER — METHYLPREDNISOLONE 4 MG/1
TABLET ORAL
Qty: 21 EACH | Refills: 0 | Status: SHIPPED | OUTPATIENT
Start: 2024-01-02 | End: 2024-01-23

## 2024-01-09 ENCOUNTER — HOSPITAL ENCOUNTER (OUTPATIENT)
Dept: RADIOLOGY | Facility: CLINIC | Age: 69
Discharge: HOME | End: 2024-01-09
Attending: NURSE PRACTITIONER
Payer: MEDICARE

## 2024-01-09 DIAGNOSIS — Z12.31 ENCOUNTER FOR SCREENING MAMMOGRAM FOR BREAST CANCER: ICD-10-CM

## 2024-01-09 PROCEDURE — 77067 SCR MAMMO BI INCL CAD: CPT | Mod: TC,PO

## 2024-01-09 PROCEDURE — 77063 BREAST TOMOSYNTHESIS BI: CPT | Mod: 26,,, | Performed by: RADIOLOGY

## 2024-01-09 PROCEDURE — 77067 SCR MAMMO BI INCL CAD: CPT | Mod: 26,,, | Performed by: RADIOLOGY

## 2024-01-17 ENCOUNTER — PATIENT MESSAGE (OUTPATIENT)
Dept: PRIMARY CARE CLINIC | Facility: CLINIC | Age: 69
End: 2024-01-17
Payer: MEDICARE

## 2024-01-28 NOTE — PLAN OF CARE
01/17/23 1534   PERRY Message   Medicare Outpatient and Observation Notification regarding financial responsibility Given to patient/caregiver;Explained to patient/caregiver;Signed/date by patient/caregiver   Date PERRY was signed 01/17/23   Time PERRY was signed 1513          Improved with IVF.

## 2024-02-18 ENCOUNTER — PATIENT MESSAGE (OUTPATIENT)
Dept: PODIATRY | Facility: CLINIC | Age: 69
End: 2024-02-18
Payer: MEDICARE

## 2024-02-19 NOTE — TELEPHONE ENCOUNTER
Spoke with patient to let her know we do not have a physician in the office at the moment but [facility] is working on getting someone to cover during the time the [facility] are out. Patient was given the numbers to local podiatrist and will call back toward the end of the month to see if we have a physician.

## 2024-03-28 ENCOUNTER — OFFICE VISIT (OUTPATIENT)
Dept: FAMILY MEDICINE | Facility: CLINIC | Age: 69
End: 2024-03-28
Payer: MEDICARE

## 2024-03-28 ENCOUNTER — LAB VISIT (OUTPATIENT)
Dept: LAB | Facility: HOSPITAL | Age: 69
End: 2024-03-28
Attending: STUDENT IN AN ORGANIZED HEALTH CARE EDUCATION/TRAINING PROGRAM
Payer: MEDICARE

## 2024-03-28 VITALS
HEART RATE: 80 BPM | HEIGHT: 68 IN | DIASTOLIC BLOOD PRESSURE: 74 MMHG | OXYGEN SATURATION: 98 % | RESPIRATION RATE: 18 BRPM | SYSTOLIC BLOOD PRESSURE: 110 MMHG | BODY MASS INDEX: 30.8 KG/M2 | WEIGHT: 203.25 LBS

## 2024-03-28 DIAGNOSIS — Z13.220 ENCOUNTER FOR LIPID SCREENING FOR CARDIOVASCULAR DISEASE: ICD-10-CM

## 2024-03-28 DIAGNOSIS — R73.03 PRE-DIABETES: ICD-10-CM

## 2024-03-28 DIAGNOSIS — M72.2 PLANTAR FASCIITIS: ICD-10-CM

## 2024-03-28 DIAGNOSIS — Z00.00 ROUTINE GENERAL MEDICAL EXAMINATION AT A HEALTH CARE FACILITY: ICD-10-CM

## 2024-03-28 DIAGNOSIS — Z00.00 ROUTINE GENERAL MEDICAL EXAMINATION AT A HEALTH CARE FACILITY: Primary | ICD-10-CM

## 2024-03-28 DIAGNOSIS — Z13.6 ENCOUNTER FOR LIPID SCREENING FOR CARDIOVASCULAR DISEASE: ICD-10-CM

## 2024-03-28 DIAGNOSIS — E61.1 IRON DEFICIENCY: ICD-10-CM

## 2024-03-28 DIAGNOSIS — H93.13 TINNITUS OF BOTH EARS: ICD-10-CM

## 2024-03-28 DIAGNOSIS — N94.9 VAGINAL DISCOMFORT: ICD-10-CM

## 2024-03-28 DIAGNOSIS — R06.83 SNORING: ICD-10-CM

## 2024-03-28 DIAGNOSIS — H91.93 DECREASED HEARING OF BOTH EARS: ICD-10-CM

## 2024-03-28 LAB
ALBUMIN SERPL BCP-MCNC: 3.8 G/DL (ref 3.5–5.2)
ALP SERPL-CCNC: 65 U/L (ref 55–135)
ALT SERPL W/O P-5'-P-CCNC: 24 U/L (ref 10–44)
ANION GAP SERPL CALC-SCNC: 9 MMOL/L (ref 8–16)
AST SERPL-CCNC: 29 U/L (ref 10–40)
BASOPHILS # BLD AUTO: 0.04 K/UL (ref 0–0.2)
BASOPHILS NFR BLD: 1 % (ref 0–1.9)
BILIRUB SERPL-MCNC: 0.2 MG/DL (ref 0.1–1)
BUN SERPL-MCNC: 11 MG/DL (ref 8–23)
CALCIUM SERPL-MCNC: 9.7 MG/DL (ref 8.7–10.5)
CHLORIDE SERPL-SCNC: 107 MMOL/L (ref 95–110)
CHOLEST SERPL-MCNC: 192 MG/DL (ref 120–199)
CHOLEST/HDLC SERPL: 4.5 {RATIO} (ref 2–5)
CO2 SERPL-SCNC: 23 MMOL/L (ref 23–29)
CREAT SERPL-MCNC: 0.8 MG/DL (ref 0.5–1.4)
DIFFERENTIAL METHOD BLD: ABNORMAL
EOSINOPHIL # BLD AUTO: 0.3 K/UL (ref 0–0.5)
EOSINOPHIL NFR BLD: 6.7 % (ref 0–8)
ERYTHROCYTE [DISTWIDTH] IN BLOOD BY AUTOMATED COUNT: 14.1 % (ref 11.5–14.5)
EST. GFR  (NO RACE VARIABLE): >60 ML/MIN/1.73 M^2
GLUCOSE SERPL-MCNC: 71 MG/DL (ref 70–110)
HCT VFR BLD AUTO: 39.2 % (ref 37–48.5)
HDLC SERPL-MCNC: 43 MG/DL (ref 40–75)
HDLC SERPL: 22.4 % (ref 20–50)
HGB BLD-MCNC: 12.2 G/DL (ref 12–16)
IMM GRANULOCYTES # BLD AUTO: 0.01 K/UL (ref 0–0.04)
IMM GRANULOCYTES NFR BLD AUTO: 0.3 % (ref 0–0.5)
IRON SERPL-MCNC: 60 UG/DL (ref 30–160)
LDLC SERPL CALC-MCNC: 131.6 MG/DL (ref 63–159)
LYMPHOCYTES # BLD AUTO: 2 K/UL (ref 1–4.8)
LYMPHOCYTES NFR BLD: 52.3 % (ref 18–48)
MCH RBC QN AUTO: 29.5 PG (ref 27–31)
MCHC RBC AUTO-ENTMCNC: 31.1 G/DL (ref 32–36)
MCV RBC AUTO: 95 FL (ref 82–98)
MONOCYTES # BLD AUTO: 0.5 K/UL (ref 0.3–1)
MONOCYTES NFR BLD: 13.2 % (ref 4–15)
NEUTROPHILS # BLD AUTO: 1 K/UL (ref 1.8–7.7)
NEUTROPHILS NFR BLD: 26.5 % (ref 38–73)
NONHDLC SERPL-MCNC: 149 MG/DL
NRBC BLD-RTO: 0 /100 WBC
PLATELET # BLD AUTO: 301 K/UL (ref 150–450)
PMV BLD AUTO: 10.6 FL (ref 9.2–12.9)
POTASSIUM SERPL-SCNC: 4 MMOL/L (ref 3.5–5.1)
PROT SERPL-MCNC: 8 G/DL (ref 6–8.4)
RBC # BLD AUTO: 4.14 M/UL (ref 4–5.4)
SATURATED IRON: 16 % (ref 20–50)
SODIUM SERPL-SCNC: 139 MMOL/L (ref 136–145)
TOTAL IRON BINDING CAPACITY: 380 UG/DL (ref 250–450)
TRANSFERRIN SERPL-MCNC: 257 MG/DL (ref 200–375)
TRIGL SERPL-MCNC: 87 MG/DL (ref 30–150)
WBC # BLD AUTO: 3.86 K/UL (ref 3.9–12.7)

## 2024-03-28 PROCEDURE — 81514 NFCT DS BV&VAGINITIS DNA ALG: CPT | Performed by: STUDENT IN AN ORGANIZED HEALTH CARE EDUCATION/TRAINING PROGRAM

## 2024-03-28 PROCEDURE — 99215 OFFICE O/P EST HI 40 MIN: CPT | Mod: S$GLB,,, | Performed by: STUDENT IN AN ORGANIZED HEALTH CARE EDUCATION/TRAINING PROGRAM

## 2024-03-28 PROCEDURE — 80053 COMPREHEN METABOLIC PANEL: CPT | Performed by: STUDENT IN AN ORGANIZED HEALTH CARE EDUCATION/TRAINING PROGRAM

## 2024-03-28 PROCEDURE — 84443 ASSAY THYROID STIM HORMONE: CPT | Performed by: STUDENT IN AN ORGANIZED HEALTH CARE EDUCATION/TRAINING PROGRAM

## 2024-03-28 PROCEDURE — 99999 PR PBB SHADOW E&M-EST. PATIENT-LVL V: CPT | Mod: PBBFAC,,, | Performed by: STUDENT IN AN ORGANIZED HEALTH CARE EDUCATION/TRAINING PROGRAM

## 2024-03-28 PROCEDURE — 36415 COLL VENOUS BLD VENIPUNCTURE: CPT | Mod: PO | Performed by: STUDENT IN AN ORGANIZED HEALTH CARE EDUCATION/TRAINING PROGRAM

## 2024-03-28 PROCEDURE — 84439 ASSAY OF FREE THYROXINE: CPT | Performed by: STUDENT IN AN ORGANIZED HEALTH CARE EDUCATION/TRAINING PROGRAM

## 2024-03-28 PROCEDURE — 80061 LIPID PANEL: CPT | Performed by: STUDENT IN AN ORGANIZED HEALTH CARE EDUCATION/TRAINING PROGRAM

## 2024-03-28 PROCEDURE — 85025 COMPLETE CBC W/AUTO DIFF WBC: CPT | Performed by: STUDENT IN AN ORGANIZED HEALTH CARE EDUCATION/TRAINING PROGRAM

## 2024-03-28 PROCEDURE — 83036 HEMOGLOBIN GLYCOSYLATED A1C: CPT | Performed by: STUDENT IN AN ORGANIZED HEALTH CARE EDUCATION/TRAINING PROGRAM

## 2024-03-28 PROCEDURE — G2211 COMPLEX E/M VISIT ADD ON: HCPCS | Mod: S$GLB,,, | Performed by: STUDENT IN AN ORGANIZED HEALTH CARE EDUCATION/TRAINING PROGRAM

## 2024-03-28 PROCEDURE — 83540 ASSAY OF IRON: CPT | Performed by: STUDENT IN AN ORGANIZED HEALTH CARE EDUCATION/TRAINING PROGRAM

## 2024-03-28 PROCEDURE — 82728 ASSAY OF FERRITIN: CPT | Performed by: STUDENT IN AN ORGANIZED HEALTH CARE EDUCATION/TRAINING PROGRAM

## 2024-03-28 NOTE — PATIENT INSTRUCTIONS
Ned Avalos,     If you are due for any health screening(s) below please notify me so we can arrange them to be ordered and scheduled. Most healthy patients at your age complete them, but you are free to accept or refuse.     If you can't do it, I'll definitely understand. If you can, I'd certainly appreciate it!    All of your core healthy metrics are met.

## 2024-03-28 NOTE — PROGRESS NOTES
"[facility]   OFFICE VISIT NOTE    Patient Name: Bailey Joseph  YOB: 1955    PRESENTING HISTORY     History of Present Illness:  Ms. Bailey Joseph is a 68 y.o. female here to establish care  Medical conditions: pre-diabetes, iron deficiency, snoring     Meds: ferrous sulfate 65 mg daily     She was told to get sleep study for suspected BAO in the past but she did not.     DEXA, mammogram -- up to date     Specialists  - podiatry: plantar fascitis -- treated with steroids before. Has another upcoming appointment   - derm     Not working right now  Denies sexual activity   Denies any smoking, vaping, or alcohol use     Underwent menopause at age 52   Has not had pap in over 6 years. Denies any abnormal pap in the past       Review of Systems   HENT:  Positive for hearing loss and tinnitus.    Respiratory:  Negative for shortness of breath.    Cardiovascular:  Negative for chest pain.   Gastrointestinal:  Negative for abdominal pain, nausea and vomiting.   Genitourinary:  Negative for hematuria, menstrual problem, pelvic pain, vaginal bleeding and vaginal discharge.          OBJECTIVE:   Vital Signs:  Vitals:    24 0946   BP: 110/74   Pulse: 80   Resp: 18   SpO2: 98%   Weight: 92.2 kg (203 lb 4.2 oz)   Height: 5' 8" (1.727 m)         Physical Exam  Constitutional:       General: She is not in acute distress.     Appearance: She is not ill-appearing or toxic-appearing.   HENT:      Head: Normocephalic and atraumatic.      Right Ear: Tympanic membrane, ear canal and external ear normal.      Left Ear: Tympanic membrane, ear canal and external ear normal.      Ears:      Comments: Diminished hearing in bilateral ears      Mouth/Throat:      Mouth: Mucous membranes are moist.      Pharynx: Uvula midline. No pharyngeal swelling.   Cardiovascular:      Rate and Rhythm: Normal rate and regular rhythm.   Pulmonary:      Effort: Pulmonary effort is normal. No tachypnea, bradypnea, " "accessory muscle usage, prolonged expiration or respiratory distress.      Breath sounds: Normal breath sounds. No stridor. No wheezing, rhonchi or rales.   Abdominal:      General: Abdomen is flat. Bowel sounds are normal. There is no distension.      Palpations: Abdomen is soft. There is no mass.      Tenderness: There is no abdominal tenderness. There is no guarding or rebound.   Neurological:      General: No focal deficit present.      Mental Status: She is alert.   Psychiatric:         Mood and Affect: Mood normal.         Behavior: Behavior normal.         ASSESSMENT & PLAN:     Routine general medical examination at a health care facility  -     TSH; Future; Expected date: 03/28/2024  -     T4, FREE; Future; Expected date: 03/28/2024  -     Ambulatory referral/consult to Obstetrics / Gynecology; Future; Expected date: 04/04/2024    Iron deficiency  -     CBC Auto Differential; Future; Expected date: 03/28/2024  -     Ferritin; Future; Expected date: 03/28/2024  -     Iron and TIBC; Future; Expected date: 03/28/2024  Patient sometimes take iron supplementation but not daily     Pre-diabetes  -     Hemoglobin A1C; Future; Expected date: 03/28/2024  -     Lipid Panel; Future; Expected date: 03/28/2024    Encounter for lipid screening for cardiovascular disease  -     Lipid Panel; Future; Expected date: 03/28/2024  -     Comprehensive Metabolic Panel; Future; Expected date: 03/28/2024    Plantar fasciitis  Handout on plantar fasciitis given to the patient   Patient has an upcoming appointment with podiatry     Snoring  -     Ambulatory referral/consult to Pulmonology; Future; Expected date: 04/04/2024  Does not feel refreshed in the morning when she wakes up  She has loud snoring at night  Ref to pulm for sleep study for BAO evaluation     Vaginal discomfort  -     VAGINOSIS SCREEN BY DNA PROBE  Patient has intermittent "fish odor" from her vaginal area without obvious discharge or pain  Will screen her for " vaginosis including BV    Tinnitus of both ears  -     Ambulatory referral/consult to Audiology; Future; Expected date: 04/04/2024  -     Ambulatory referral/consult to ENT; Future; Expected date: 04/04/2024  Patient reports worsening tinnitus and diminished hearing  Audiology and ENT referral for further evaluation     Decreased hearing of both ears  -     Ambulatory referral/consult to Audiology; Future; Expected date: 04/04/2024  -     Ambulatory referral/consult to ENT; Future; Expected date: 04/04/2024      Jackie Alcaraz MD  Family Medicine  [facility]     This note was created using [employer] voice recognition software that occasionally misinterprets phrases or words

## 2024-03-29 LAB
BACTERIAL VAGINOSIS DNA: NEGATIVE
CANDIDA GLABRATA DNA: NEGATIVE
CANDIDA KRUSEI DNA: NEGATIVE
CANDIDA RRNA VAG QL PROBE: NEGATIVE
ESTIMATED AVG GLUCOSE: 123 MG/DL (ref 68–131)
FERRITIN SERPL-MCNC: 136 NG/ML (ref 20–300)
HBA1C MFR BLD: 5.9 % (ref 4–5.6)
T VAGINALIS RRNA GENITAL QL PROBE: NEGATIVE
T4 FREE SERPL-MCNC: 0.91 NG/DL (ref 0.71–1.51)
TSH SERPL DL<=0.005 MIU/L-ACNC: 2.29 UIU/ML (ref 0.4–4)

## 2024-04-01 ENCOUNTER — OFFICE VISIT (OUTPATIENT)
Dept: PODIATRY | Facility: CLINIC | Age: 69
End: 2024-04-01
Payer: MEDICARE

## 2024-04-01 VITALS — WEIGHT: 204.13 LBS | HEIGHT: 68 IN | BODY MASS INDEX: 30.94 KG/M2

## 2024-04-01 DIAGNOSIS — M79.672 PAIN OF LEFT HEEL: ICD-10-CM

## 2024-04-01 DIAGNOSIS — M72.2 PLANTAR FASCIITIS: Primary | ICD-10-CM

## 2024-04-01 DIAGNOSIS — M79.672 LEFT FOOT PAIN: ICD-10-CM

## 2024-04-01 PROCEDURE — 99999 PR PBB SHADOW E&M-EST. PATIENT-LVL III: CPT | Mod: PBBFAC,,, | Performed by: PODIATRIST

## 2024-04-01 PROCEDURE — 99213 OFFICE O/P EST LOW 20 MIN: CPT | Mod: 25,S$GLB,, | Performed by: PODIATRIST

## 2024-04-01 PROCEDURE — 20550 NJX 1 TENDON SHEATH/LIGAMENT: CPT | Mod: LT,S$GLB,, | Performed by: PODIATRIST

## 2024-04-01 RX ORDER — DEXAMETHASONE SODIUM PHOSPHATE 4 MG/ML
4 INJECTION, SOLUTION INTRA-ARTICULAR; INTRALESIONAL; INTRAMUSCULAR; INTRAVENOUS; SOFT TISSUE
Status: COMPLETED | OUTPATIENT
Start: 2024-04-01 | End: 2024-04-01

## 2024-04-01 RX ORDER — METHYLPREDNISOLONE ACETATE 40 MG/ML
20 INJECTION, SUSPENSION INTRA-ARTICULAR; INTRALESIONAL; INTRAMUSCULAR; SOFT TISSUE
Status: COMPLETED | OUTPATIENT
Start: 2024-04-01 | End: 2024-04-01

## 2024-04-01 RX ORDER — BUPIVACAINE HYDROCHLORIDE 5 MG/ML
1.5 INJECTION, SOLUTION PERINEURAL
Status: COMPLETED | OUTPATIENT
Start: 2024-04-01 | End: 2024-04-01

## 2024-04-01 RX ADMIN — DEXAMETHASONE SODIUM PHOSPHATE 4 MG: 4 INJECTION, SOLUTION INTRA-ARTICULAR; INTRALESIONAL; INTRAMUSCULAR; INTRAVENOUS; SOFT TISSUE at 09:04

## 2024-04-01 RX ADMIN — METHYLPREDNISOLONE ACETATE 20 MG: 40 INJECTION, SUSPENSION INTRA-ARTICULAR; INTRALESIONAL; INTRAMUSCULAR; SOFT TISSUE at 09:04

## 2024-04-01 RX ADMIN — BUPIVACAINE HYDROCHLORIDE 7.5 MG: 5 INJECTION, SOLUTION PERINEURAL at 09:04

## 2024-04-01 NOTE — PROGRESS NOTES
"  [address]  [place] [address]  Phone: [phone]   [phone]    Patient's PCP:Jackie Alcaraz MD  Referring Provider: No ref. provider found    Subjective:      Chief Complaint:: Heel Pain (Left heel pain)    JOHN Joseph is a 68 y.o. female who presents today with a complaint of left heel pain. The current episode unresolved.  The symptoms include aching, throbbing, and shooting pain. Probable cause of complaint unknown.  The symptoms are aggravated by first few steps getting up and when rest. The problem has stayed the same. Treatment to date have included ice and flexing which provided some relief. Last injection 11/15/23.        Vitals:    04/01/24 0856   Weight: 92.6 kg (204 lb 2.3 oz)   Height: 5' 8" (1.727 m)   PainSc:   6      Shoe Size: 9-9.5    Past Surgical History:   Procedure Laterality Date    BREAST BIOPSY Left     COLONOSCOPY N/A 05/10/2023    Procedure: COLONOSCOPY;  Surgeon: Angelica Washburn MD;  Location: [facility];  Service: Endoscopy;  Laterality: N/A;    GALLBLADDER SURGERY N/A     LAPAROSCOPIC CHOLECYSTECTOMY N/A 01/16/2023    Procedure: CHOLECYSTECTOMY, LAPAROSCOPIC;  Surgeon: Jean-Claude Salazar III, MD;  Location: [facility] OR;  Service: General;  Laterality: N/A;    LIPOMA RESECTION      TONSILLECTOMY       Past Medical History:   Diagnosis Date    Anemia      Family History   Problem Relation Age of Onset    Diabetes Mother     Heart disease Mother     Lung cancer Father     Heart disease Father     Diabetes Sister     Diabetes Brother     Diabetes Brother         Social History:   Marital Status: Significant Other  Alcohol History:  reports current alcohol use.  Tobacco History:  reports that she has never smoked. She has never been exposed to tobacco smoke. She has never used smokeless tobacco.  Drug History:  reports no history of drug use.    Review of patient's allergies indicates:  No Known Allergies    Current Outpatient Medications   Medication Sig " Dispense Refill    ferrous sulfate, dried (SLOW FE) 160 mg (50 mg iron) TbSR Take 160 mg by mouth once daily.       No current facility-administered medications for this visit.       Review of Systems   Constitutional:  Negative for chills, fatigue, fever and unexpected weight change.   HENT:  Negative for hearing loss and trouble swallowing.    Eyes:  Negative for photophobia and visual disturbance.   Respiratory:  Negative for cough, shortness of breath and wheezing.    Cardiovascular:  Negative for chest pain, palpitations and leg swelling.   Gastrointestinal:  Negative for abdominal pain and nausea.   Genitourinary:  Negative for dysuria and frequency.   Musculoskeletal:  Negative for arthralgias, back pain, gait problem, joint swelling, myalgias and neck pain.   Skin:  Negative for rash and wound.   Neurological:  Negative for tremors, seizures, weakness, numbness and headaches.   Hematological:  Does not bruise/bleed easily.         Objective:        Physical Exam:   Foot Exam    General  General Appearance: appears stated age and healthy   Orientation: alert and oriented to person, place, and time   Affect: appropriate   Gait: antalgic       Left Foot/Ankle      Inspection and Palpation  Ecchymosis: none  Tenderness: plantar fascia and calcaneus tenderness   Swelling: none   Arch: pes cavus  Skin Exam: skin intact;   Neurovascular  Dorsalis pedis: 2+  Posterior tibial: 2+  Capillary refill: 2+  Saphenous nerve sensation: normal  Tibial nerve sensation: normal  Superficial peroneal nerve sensation: normal  Deep peroneal nerve sensation: normal  Sural nerve sensation: normal    Muscle Strength  Ankle dorsiflexion: 5  Ankle plantar flexion: 5  Ankle inversion: 5  Ankle eversion: 5  Great toe extension: 5  Great toe flexion: 5    Range of Motion    Normal left ankle ROM  Passive  Ankle dorsiflexion: 10    Active  Ankle dorsiflexion: 10    Tests  Calcaneal squeeze: negative   PT Tinel's sign:  negative  Paresthesia: negative  Comments  Pain on palpation of the central plantar heel and lateral heel. No pain present  with side to side compression of the calcaneus. Negative tinnel's sign  at the tarsal tunnel. Negative Monroe's nerve pain. Negative Calcaneal nerve pain. No soft tissue masses. Pain present with dorsiflexion of the ankle. No edema, erythema, or ecchymosis noted.      Physical Exam  Cardiovascular:      Pulses:           Dorsalis pedis pulses are 2+ on the left side.        Posterior tibial pulses are 2+ on the left side.   Musculoskeletal:        Feet:                Left Ankle/Foot Exam     Range of Motion   The patient has normal left ankle ROM.     Muscle Strength   The patient has normal left ankle strength.    Comments:  Pain on palpation of the central plantar heel and lateral heel. No pain present  with side to side compression of the calcaneus. Negative tinnel's sign  at the tarsal tunnel. Negative Monroe's nerve pain. Negative Calcaneal nerve pain. No soft tissue masses. Pain present with dorsiflexion of the ankle. No edema, erythema, or ecchymosis noted.        Muscle Strength   Left Lower Extremity   Ankle Dorsiflexion:  5   Plantar flexion:  5/5     Vascular Exam       Left Pulses  Dorsalis Pedis:      2+  Posterior Tibial:      2+           Imaging:            Assessment:       1. Plantar fasciitis - Left Foot    2. Left foot pain    3. Pain of left heel      Plan:   Plantar fasciitis - Left Foot    Left foot pain    Pain of left heel    Discussed different treatment options for heel pain. I gave written and verbal instructions on heel cord stretching and this was demonstrated for the patient. Patient expressed understanding. Discussed wearing appropriate shoe gear and avoiding flats, slippers, sandals, barefoot, and sockfeet. Recommended arch supports. My recommendation for OTC supports is Spenco polysorb replacement insoles or patient may elect more aggressive treatment with  prescription arch supports. We also discussed cortisone injections and NSAID therapy.      Plantar Fasciitis Level II Treatment:   Continue the conservative treatment of Level I, which is:   Anti-inflammatory topical and oral anti-inflammatories  No bare feet  Over the counter insert cross trainers by [employer]  Restrict activity level   Use running shoes at full time refer to varsity sports  No impact exercise and stretch gastrocnemius muscle    Steroid Injection:  A steroid injection was performed at plantar lateral and central heel left foot by plantar fascia using 0.5% plain Marcaine 1-1/2 cc, 1 cc Decadron phosphate, 1/2 cc of methylprednisolone.  This was well tolerated.     Return as needed    Procedures          Counseling:     I provided patient education verbally regarding:   Patient diagnosis, treatment options, as well as alternatives, risks, and benefits.     This note was created using Dragon voice recognition software that occasionally misinterpreted phrases or words.

## 2024-05-03 ENCOUNTER — OFFICE VISIT (OUTPATIENT)
Dept: PULMONOLOGY | Facility: CLINIC | Age: 69
End: 2024-05-03
Payer: MEDICARE

## 2024-05-03 VITALS
BODY MASS INDEX: 30.96 KG/M2 | DIASTOLIC BLOOD PRESSURE: 63 MMHG | WEIGHT: 204.25 LBS | SYSTOLIC BLOOD PRESSURE: 107 MMHG | HEIGHT: 68 IN | OXYGEN SATURATION: 98 % | HEART RATE: 64 BPM

## 2024-05-03 DIAGNOSIS — G47.30 SLEEP APNEA, UNSPECIFIED TYPE: Primary | ICD-10-CM

## 2024-05-03 DIAGNOSIS — G47.10 DAYTIME HYPERSOMNIA: ICD-10-CM

## 2024-05-03 DIAGNOSIS — R06.83 SNORING: ICD-10-CM

## 2024-05-03 PROCEDURE — 3044F HG A1C LEVEL LT 7.0%: CPT | Mod: CPTII,S$GLB,, | Performed by: NURSE PRACTITIONER

## 2024-05-03 PROCEDURE — 3008F BODY MASS INDEX DOCD: CPT | Mod: CPTII,S$GLB,, | Performed by: NURSE PRACTITIONER

## 2024-05-03 PROCEDURE — 1159F MED LIST DOCD IN RCRD: CPT | Mod: CPTII,S$GLB,, | Performed by: NURSE PRACTITIONER

## 2024-05-03 PROCEDURE — 3074F SYST BP LT 130 MM HG: CPT | Mod: CPTII,S$GLB,, | Performed by: NURSE PRACTITIONER

## 2024-05-03 PROCEDURE — 1126F AMNT PAIN NOTED NONE PRSNT: CPT | Mod: CPTII,S$GLB,, | Performed by: NURSE PRACTITIONER

## 2024-05-03 PROCEDURE — 3288F FALL RISK ASSESSMENT DOCD: CPT | Mod: CPTII,S$GLB,, | Performed by: NURSE PRACTITIONER

## 2024-05-03 PROCEDURE — 1101F PT FALLS ASSESS-DOCD LE1/YR: CPT | Mod: CPTII,S$GLB,, | Performed by: NURSE PRACTITIONER

## 2024-05-03 PROCEDURE — 99213 OFFICE O/P EST LOW 20 MIN: CPT | Mod: S$GLB,,, | Performed by: NURSE PRACTITIONER

## 2024-05-03 PROCEDURE — 3078F DIAST BP <80 MM HG: CPT | Mod: CPTII,S$GLB,, | Performed by: NURSE PRACTITIONER

## 2024-05-03 PROCEDURE — 99999 PR PBB SHADOW E&M-EST. PATIENT-LVL III: CPT | Mod: PBBFAC,,, | Performed by: NURSE PRACTITIONER

## 2024-05-03 NOTE — PATIENT INSTRUCTIONS
I have ordered an at home sleep study to evaluate for sleep apnea. If test is positive, I will order a CPAP machine. Please notify my clinic when you receive the machine to set up a 31 day compliance appointment. You must use the machine for a least 4 hours every night to avoid insurance denial.   Will do through [facility].    Continue current prescription medication regiment. Keep follow up appointment as scheduled. Please call the office if you have any questions or concerns.

## 2024-05-03 NOTE — PROGRESS NOTES
5/3/2024    Bailey Joseph  In office visit     Chief Complaint   Patient presents with    Sleep Apnea       HPI:  05/03/2024:  Did not undergo sleep study in the past. Interested in sleep study at this time.  Endorses daytime fatigue, nocturnal arousals, snoring. Denies morning headaches, depression.   Denies shortness of breath currently, wheezing, chest tightness.  Denies cough, mucus production.  Boston Sleepiness Scale from 05/03/2024 REVIEWED: 19          02/06/2023:  Denies being seen by prior pulmonologist.  Denies current use of inhaler therapy, supplemental oxygen, CPAP.  Denies personal history of cancer, PE, anticoagulation use.  Does endorse being diagnosed with asthma as a child.  Referred to pulmonology clinic today for evaluation of sleep apnea.  Endorses daytime fatigue, nocturnal arousals, snoring.  Denies morning headaches, depression.  Recently underwent cholecystectomy on January 16, 2023, while in hospital patient's SpO2 noted to decrease to 80 percent overnight while on room air, according to nursing note.  Denies shortness of breath currently, wheezing, chest tightness.  Denies cough, mucus production.  Endorses exposure to secondhand smoke.  SLEEP ROUTINE:  Activity the hour prior to sleep:  Reading  Bed partner:  Partner  Time to bed:  2200  Lights On/Off:  Off  Sleep onset latency:  5 minutes or so       Disruptions or awakenings:  Frequent arousals Nightly  Wakeup time:  6:00 a.m.  Perceived sleep quality:  Poor  Daytime naps:  When able      Weekend sleep routine:  No change  Caffeine use:  2 cups of coffee per day  Exercise habit:  No  EPWORTH SLEEPINESS SCALE 2/6/2023: 18  Patient Instructions   I have ordered an at home sleep study to evaluate for sleep apnea. If test is positive, I will order a CPAP machine. Please notify my clinic when you receive the machine to set up a 31 day compliance appointment. You must use the machine for a least 4 hours every night to avoid insurance denial.    Check chest x-ray to follow-up from prior.  Continue current medication regiment. Keep follow up appointment as scheduled. Please call the office if you have any questions or concerns.           Social Hx:  Lives with friend -no animals in the home.  Former .  No Asbestosis exposure, Smoking Hx:  Never smoker  Family Hx:  Father and brother with Lung Cancer, no COPD, no Asthma  Medical Hx:  No previous pneumonia ; no previous shoulder/chest surgery        The chief compliant  problem varies with instability at times.   PFSH:  Past Medical History:   Diagnosis Date    Anemia          Past Surgical History:   Procedure Laterality Date    BREAST BIOPSY Left     COLONOSCOPY N/A 05/10/2023    Procedure: COLONOSCOPY;  Surgeon: Angelica Washburn MD;  Location: [facility] ENDO;  Service: Endoscopy;  Laterality: N/A;    GALLBLADDER SURGERY N/A     LAPAROSCOPIC CHOLECYSTECTOMY N/A 01/16/2023    Procedure: CHOLECYSTECTOMY, LAPAROSCOPIC;  Surgeon: Jean-Claude Salazar III, MD;  Location: [facility] OR;  Service: General;  Laterality: N/A;    LIPOMA RESECTION      TONSILLECTOMY       Social History     Tobacco Use    Smoking status: Never     Passive exposure: Never    Smokeless tobacco: Never   Substance Use Topics    Alcohol use: Yes     Comment: occasional    Drug use: No     Family History   Problem Relation Name Age of Onset    Diabetes Mother      Heart disease Mother      Lung cancer Father      Heart disease Father      Diabetes Sister      Diabetes Brother      Diabetes Brother       Review of patient's allergies indicates:  No Known Allergies  I have reviewed past medical, family, and social history. I have reviewed previous nurse notes.    Performance Status:The patient's activity level is no limits with regular activity.      Review of Systems:  a review of eleven systems covering constitutional, Eye, HEENT, Psych, Respiratory, Cardiac, GI, , Musculoskeletal, Endocrine, Dermatologic was negative except for  "pertinent findings as listed ABOVE and below: pertinent positive as above, rest is good       Exam:Comprehensive exam done. /63 (BP Location: Left arm, Patient Position: Sitting, BP Method: Large (Automatic))   Pulse 64   Ht 5' 8" (1.727 m)   Wt 92.7 kg (204 lb 4.1 oz)   SpO2 98%   BMI 31.06 kg/m²   Exam included Vitals as listed  Constitutional: She is oriented to person, place, and time. She appears well-developed. No distress.   Nose: Nose normal.   Mouth/Throat: Uvula is midline, oropharynx is clear and moist and mucous membranes are normal. No dental caries. No oropharyngeal exudate, posterior oropharyngeal edema, posterior oropharyngeal erythema or tonsillar abscesses.    Eyes: Pupils are equal, round, and reactive to light.   Neck: No JVD present. No thyromegaly present.   Cardiovascular: Normal rate, regular rhythm and normal heart sounds. Exam reveals no gallop and no friction rub.   No murmur heard.  Pulmonary/Chest: Effort normal and breath sounds normal. No accessory muscle usage or stridor. No apnea and no tachypnea. No respiratory distress, decreased breath sounds, wheezes, rhonchi, rales, or tenderness. Clear breath sounds throughout, on room air, in no acute distress.  Abdominal: Soft. She exhibits no mass. There is no tenderness. No hepatosplenomegaly, hernias and normoactive bowel sounds  Musculoskeletal: Normal range of motion. exhibits no edema.   Neurological:  alert and oriented to person, place, and time. not disoriented.   Skin: Skin is warm and dry. Capillary refill takes less 2 sec. No cyanosis or erythema. No pallor. Nails show no clubbing.   Psychiatric: normal mood and affect. behavior is normal. Judgment and thought content normal.       Radiographs (ct chest and cxr) reviewed: view by direct vision     X-Ray Chest 1 View 1/17/2023 FINDINGS: Cardiomediastinal silhouette is within normal limits. Lungs are normally expanded with no airspace consolidation. No pleural effusion or " pneumothorax. No acute osseous abnormality.   IMPRESSION: No acute pulmonary process.      Patient's labs were reviewed including CBC and CMP    Lab Results   Component Value Date    WBC 3.86 (L) 03/28/2024    RBC 4.14 03/28/2024    HGB 12.2 03/28/2024    HCT 39.2 03/28/2024    MCV 95 03/28/2024    MCH 29.5 03/28/2024    MCHC 31.1 (L) 03/28/2024    RDW 14.1 03/28/2024     03/28/2024    MPV 10.6 03/28/2024    GRAN 1.0 (L) 03/28/2024    GRAN 26.5 (L) 03/28/2024    LYMPH 2.0 03/28/2024    LYMPH 52.3 (H) 03/28/2024    MONO 0.5 03/28/2024    MONO 13.2 03/28/2024    EOS 0.3 03/28/2024    BASO 0.04 03/28/2024    EOSINOPHIL 6.7 03/28/2024    BASOPHIL 1.0 03/28/2024   CMP  Sodium   Date Value Ref Range Status   03/28/2024 139 136 - 145 mmol/L Final     Potassium   Date Value Ref Range Status   03/28/2024 4.0 3.5 - 5.1 mmol/L Final     Chloride   Date Value Ref Range Status   03/28/2024 107 95 - 110 mmol/L Final     CO2   Date Value Ref Range Status   03/28/2024 23 23 - 29 mmol/L Final     Glucose   Date Value Ref Range Status   03/28/2024 71 70 - 110 mg/dL Final     BUN   Date Value Ref Range Status   03/28/2024 11 8 - 23 mg/dL Final     Creatinine   Date Value Ref Range Status   03/28/2024 0.8 0.5 - 1.4 mg/dL Final     Calcium   Date Value Ref Range Status   03/28/2024 9.7 8.7 - 10.5 mg/dL Final     Total Protein   Date Value Ref Range Status   03/28/2024 8.0 6.0 - 8.4 g/dL Final     Albumin   Date Value Ref Range Status   03/28/2024 3.8 3.5 - 5.2 g/dL Final     Total Bilirubin   Date Value Ref Range Status   03/28/2024 0.2 0.1 - 1.0 mg/dL Final     Comment:     For infants and newborns, interpretation of results should be based  on gestational age, weight and in agreement with clinical  observations.    Premature Infant recommended reference ranges:  Up to 24 hours.............<8.0 mg/dL  Up to 48 hours............<12.0 mg/dL  3-5 days..................<15.0 mg/dL  6-29 days.................<15.0 mg/dL        Alkaline Phosphatase   Date Value Ref Range Status   03/28/2024 65 55 - 135 U/L Final     AST   Date Value Ref Range Status   03/28/2024 29 10 - 40 U/L Final     ALT   Date Value Ref Range Status   03/28/2024 24 10 - 44 U/L Final     Anion Gap   Date Value Ref Range Status   03/28/2024 9 8 - 16 mmol/L Final     eGFR   Date Value Ref Range Status   03/28/2024 >60.0 >60 mL/min/1.73 m^2 Final         PFT was not done  Pulmonary Functions Testing Results:        Plan:  Clinical impression is apparently straight forward and impression with management as below.    Bailey was seen today for sleep apnea.    Diagnoses and all orders for this visit:    Sleep apnea, unspecified type  -     Home Sleep Study; Future    Snoring  -     Ambulatory referral/consult to Pulmonology  -     Home Sleep Study; Future    Daytime hypersomnia        Follow up in about 4 months (around 9/3/2024), or if symptoms worsen or fail to improve.    Discussed with patient above for education the following:      Patient Instructions   I have ordered an at home sleep study to evaluate for sleep apnea. If test is positive, I will order a CPAP machine. Please notify my clinic when you receive the machine to set up a 31 day compliance appointment. You must use the machine for a least 4 hours every night to avoid insurance denial.   Will do through [facility].    Continue current prescription medication regiment. Keep follow up appointment as scheduled. Please call the office if you have any questions or concerns.

## 2024-05-13 ENCOUNTER — TELEPHONE (OUTPATIENT)
Dept: OTOLARYNGOLOGY | Facility: CLINIC | Age: 69
End: 2024-05-13
Payer: MEDICARE

## 2024-05-13 NOTE — TELEPHONE ENCOUNTER
"Called pt regarding ENT appt with Mr. Earline PA-C on Friday afternoon. Appt notes state "hearing loss and tinnitus". Asked pt if she has had recent hearing testing? Pt states that she has not. Asked pt to arrive at 1330 to have hearing test prior to seeing the provider. Pt agreeable to that. ThanksGladis  "

## 2024-05-17 ENCOUNTER — CLINICAL SUPPORT (OUTPATIENT)
Dept: AUDIOLOGY | Facility: CLINIC | Age: 69
End: 2024-05-17
Payer: MEDICARE

## 2024-05-17 ENCOUNTER — OFFICE VISIT (OUTPATIENT)
Dept: OTOLARYNGOLOGY | Facility: CLINIC | Age: 69
End: 2024-05-17
Payer: MEDICARE

## 2024-05-17 VITALS
SYSTOLIC BLOOD PRESSURE: 100 MMHG | WEIGHT: 203.69 LBS | DIASTOLIC BLOOD PRESSURE: 69 MMHG | HEART RATE: 87 BPM | BODY MASS INDEX: 30.87 KG/M2 | HEIGHT: 68 IN

## 2024-05-17 DIAGNOSIS — H93.13 TINNITUS OF BOTH EARS: ICD-10-CM

## 2024-05-17 DIAGNOSIS — H90.3 SENSORINEURAL HEARING LOSS, BILATERAL: ICD-10-CM

## 2024-05-17 DIAGNOSIS — H90.3 SENSORINEURAL HEARING LOSS (SNHL) OF BOTH EARS: ICD-10-CM

## 2024-05-17 DIAGNOSIS — H91.93 DECREASED HEARING OF BOTH EARS: ICD-10-CM

## 2024-05-17 DIAGNOSIS — H93.13 TINNITUS OF BOTH EARS: Primary | ICD-10-CM

## 2024-05-17 PROCEDURE — 3044F HG A1C LEVEL LT 7.0%: CPT | Mod: CPTII,S$GLB,, | Performed by: PHYSICIAN ASSISTANT

## 2024-05-17 PROCEDURE — 3008F BODY MASS INDEX DOCD: CPT | Mod: CPTII,S$GLB,, | Performed by: PHYSICIAN ASSISTANT

## 2024-05-17 PROCEDURE — 92567 TYMPANOMETRY: CPT | Mod: S$GLB,,, | Performed by: AUDIOLOGIST

## 2024-05-17 PROCEDURE — 1126F AMNT PAIN NOTED NONE PRSNT: CPT | Mod: CPTII,S$GLB,, | Performed by: PHYSICIAN ASSISTANT

## 2024-05-17 PROCEDURE — 99999 PR PBB SHADOW E&M-EST. PATIENT-LVL II: CPT | Mod: PBBFAC,,, | Performed by: AUDIOLOGIST

## 2024-05-17 PROCEDURE — 99999 PR PBB SHADOW E&M-EST. PATIENT-LVL IV: CPT | Mod: PBBFAC,,, | Performed by: PHYSICIAN ASSISTANT

## 2024-05-17 PROCEDURE — 1160F RVW MEDS BY RX/DR IN RCRD: CPT | Mod: CPTII,S$GLB,, | Performed by: PHYSICIAN ASSISTANT

## 2024-05-17 PROCEDURE — 1101F PT FALLS ASSESS-DOCD LE1/YR: CPT | Mod: CPTII,S$GLB,, | Performed by: PHYSICIAN ASSISTANT

## 2024-05-17 PROCEDURE — 3078F DIAST BP <80 MM HG: CPT | Mod: CPTII,S$GLB,, | Performed by: PHYSICIAN ASSISTANT

## 2024-05-17 PROCEDURE — 3288F FALL RISK ASSESSMENT DOCD: CPT | Mod: CPTII,S$GLB,, | Performed by: PHYSICIAN ASSISTANT

## 2024-05-17 PROCEDURE — 92557 COMPREHENSIVE HEARING TEST: CPT | Mod: S$GLB,,, | Performed by: AUDIOLOGIST

## 2024-05-17 PROCEDURE — 3074F SYST BP LT 130 MM HG: CPT | Mod: CPTII,S$GLB,, | Performed by: PHYSICIAN ASSISTANT

## 2024-05-17 PROCEDURE — 99203 OFFICE O/P NEW LOW 30 MIN: CPT | Mod: S$GLB,,, | Performed by: PHYSICIAN ASSISTANT

## 2024-05-17 PROCEDURE — 1159F MED LIST DOCD IN RCRD: CPT | Mod: CPTII,S$GLB,, | Performed by: PHYSICIAN ASSISTANT

## 2024-05-20 ENCOUNTER — PROCEDURE VISIT (OUTPATIENT)
Dept: SLEEP MEDICINE | Facility: HOSPITAL | Age: 69
End: 2024-05-20
Attending: NURSE PRACTITIONER
Payer: MEDICARE

## 2024-05-20 DIAGNOSIS — G47.30 SLEEP APNEA, UNSPECIFIED TYPE: ICD-10-CM

## 2024-05-20 DIAGNOSIS — R06.83 SNORING: ICD-10-CM

## 2024-05-20 PROCEDURE — 95806 SLEEP STUDY UNATT&RESP EFFT: CPT

## 2024-05-20 NOTE — PROGRESS NOTES
Bailey Joseph was seen 5/17/2024 for an audiological evaluation. Pt was alone during today's visit. Pertinent complaints today include hearing loss. Pt denies history of loud noise exposure and denies early onset of genetic family history of hearing loss. Otoscopy revealed no cerumen in both ears. The tympanic membrane was visualized AU prior to proceeding with the hearing testing.     Results reveal a mild-to-profound sensorineural hearing loss for the right ear and  mild-to-profound sensorineural hearing loss for the left ear.    Speech Reception Thresholds were  40 dBHL for the right ear and 35 dBHL for the left ear.    Word recognition scores were good for the right ear and good for the left ear.   Tympanograms were Type A for the right ear and Type A for the left ear.    Recommendations: 1) Follow up with ENT     2) Annual hearing evaluation     3) Hearing aid consult when ready    Audiogram results were reviewed in detail with patient and all questions were answered. Results will be reviewed by the referring provider at the completion of this note. All complaints were addressed during this visit to the patient's satisfaction.

## 2024-05-24 ENCOUNTER — PATIENT MESSAGE (OUTPATIENT)
Dept: PULMONOLOGY | Facility: CLINIC | Age: 69
End: 2024-05-24
Payer: MEDICARE

## 2024-05-27 NOTE — PROGRESS NOTES
"[facility]    Subjective:      Patient: Bailey Joseph Patient PCP: Jackie Alcaraz MD         :  1955     Sex:  female      MRN:  [id number]          Date of Visit: 2024      Chief Complaint: Dizziness    Patient ID: Bailey Joseph is a 68 y.o. female who presents to office for evaluation of dizziness.  has had dizziness for a while.  gets dizzy with quick head movements.  did have a fall last October-bent down to put on clothing and lifted head too quickly, got dizzy and fell. Audiogram done today. States that has consistent noise in her ears. States "sounds like insects outside in the woods".    Past Medical History  She has a past medical history of Anemia.    Family History  Her family history includes Diabetes in her brother, brother, mother, and sister; Heart disease in her father and mother; Lung cancer in her father.    Past Surgical History:   Procedure Laterality Date    BREAST BIOPSY Left     COLONOSCOPY N/A 05/10/2023    Procedure: COLONOSCOPY;  Surgeon: Angelica Washburn MD;  Location: [facility];  Service: Endoscopy;  Laterality: N/A;    GALLBLADDER SURGERY N/A     LAPAROSCOPIC CHOLECYSTECTOMY N/A 2023    Procedure: CHOLECYSTECTOMY, LAPAROSCOPIC;  Surgeon: Jean-Claude Salazar III, MD;  Location: [facility];  Service: General;  Laterality: N/A;    LIPOMA RESECTION      TONSILLECTOMY       Social History     Tobacco Use    Smoking status: Never     Passive exposure: Past    Smokeless tobacco: Never   Substance and Sexual Activity    Alcohol use: Yes     Comment: occasional    Drug use: No    Sexual activity: Not Currently     Medications  She has a current medication list which includes the following prescription(s): ferrous sulfate, dried.    Review of patient's allergies indicates:  No Known Allergies  All medications, allergies, and past history have been reviewed.    Objective:      Vitals:      2024     8:56 AM 5/3/2024     8:14 AM 2024     2:01 PM   Vitals - 1 " "value per visit   SYSTOLIC  107 112   DIASTOLIC  63 73   Pulse  64 84   SPO2  98 %    Weight (lb) 204.15 204.26 203.71   Weight (kg) 92.6 92.65 92.4   Height 5' 8" (1.727 m) 5' 8" (1.727 m) 5' 8" (1.727 m)   BMI (Calculated) 31 31.1 31   Pain Score Six Zero Zero       Body surface area is 2.11 meters squared.    Physical Exam  Constitutional:       General: She is not in acute distress.     Appearance: Normal appearance. She is not ill-appearing.   HENT:      Head: Normocephalic and atraumatic.      Right Ear: Tympanic membrane, ear canal and external ear normal.      Left Ear: Tympanic membrane, ear canal and external ear normal.      Nose: Nose normal.      Mouth/Throat:      Lips: Pink. No lesions.      Mouth: Mucous membranes are moist. No oral lesions.      Tongue: No lesions.      Palate: No lesions.      Pharynx: Oropharynx is clear. Uvula midline. No pharyngeal swelling, oropharyngeal exudate, posterior oropharyngeal erythema or uvula swelling.   Eyes:      General:         Right eye: No discharge.         Left eye: No discharge.      Extraocular Movements: Extraocular movements intact.      Conjunctiva/sclera: Conjunctivae normal.   Pulmonary:      Effort: Pulmonary effort is normal.   Neurological:      General: No focal deficit present.      Mental Status: She is alert and oriented to person, place, and time. Mental status is at baseline.   Psychiatric:         Mood and Affect: Mood normal.         Behavior: Behavior normal.         Thought Content: Thought content normal.         Judgment: Judgment normal.       Fukuda Step Test: Negative deviation  Romberg: Negative  Elina-Hallpike:   Negative Right  Negative Left.      Labs:  WBC   Date Value Ref Range Status   03/28/2024 3.86 (L) 3.90 - 12.70 K/uL Final     Platelets   Date Value Ref Range Status   03/28/2024 301 150 - 450 K/uL Final     Creatinine   Date Value Ref Range Status   03/28/2024 0.8 0.5 - 1.4 mg/dL Final     TSH   Date Value Ref Range Status "   03/28/2024 2.290 0.400 - 4.000 uIU/mL Final     Glucose   Date Value Ref Range Status   03/28/2024 71 70 - 110 mg/dL Final     Hemoglobin A1C   Date Value Ref Range Status   03/28/2024 5.9 (H) 4.0 - 5.6 % Final     Comment:     ADA Screening Guidelines:  5.7-6.4%  Consistent with prediabetes  >or=6.5%  Consistent with diabetes    High levels of fetal hemoglobin interfere with the HbA1C  assay. Heterozygous hemoglobin variants (HbS, HgC, etc)do  not significantly interfere with this assay.   However, presence of multiple variants may affect accuracy.         Audiogram Summary:    All lab results and imaging results have been reviewed.    Assessment:        ICD-10-CM ICD-9-CM   1. Tinnitus of both ears  H93.13 388.30   2. Sensorineural hearing loss (SNHL) of both ears  H90.3 389.18            Plan:      Dizziness improved. Vestibular physical examination normal today in office. Audiogram reviewed showing mild to profound SNHL AU. Tinnitus secondary to hearing loss discussed with pt. Pt advised about masking techniques for tinnitus such as a white noise machine at night or light ambient background music during the day to help mask tinnitus. Hearing aids can also help with tinnitus if pt chooses them in the future. Pt is medically cleared for hearing aids if she would like to pursue them. We will monitor hearing loss with annual audiograms and pt may follow up sooner than annual audiograms if dizziness recurs or if having ENT issues/concerns.

## 2024-05-28 DIAGNOSIS — Z00.00 ENCOUNTER FOR MEDICARE ANNUAL WELLNESS EXAM: ICD-10-CM

## 2024-05-30 ENCOUNTER — TELEPHONE (OUTPATIENT)
Dept: OBSTETRICS AND GYNECOLOGY | Facility: CLINIC | Age: 69
End: 2024-05-30
Payer: MEDICARE

## 2024-05-30 NOTE — TELEPHONE ENCOUNTER
Spoke with pt to reschedule appt with Dr. Arguello. New appt is 6/21/24 @ 9:30 am. Pt verbalized understanding.

## 2024-06-27 ENCOUNTER — TELEPHONE (OUTPATIENT)
Dept: ADMINISTRATIVE | Facility: CLINIC | Age: 69
End: 2024-06-27
Payer: MEDICARE

## 2024-07-03 ENCOUNTER — OFFICE VISIT (OUTPATIENT)
Dept: PODIATRY | Facility: CLINIC | Age: 69
End: 2024-07-03
Payer: MEDICARE

## 2024-07-03 VITALS — HEIGHT: 68 IN | WEIGHT: 206.81 LBS | BODY MASS INDEX: 31.34 KG/M2

## 2024-07-03 DIAGNOSIS — M72.2 PLANTAR FASCIITIS: Primary | ICD-10-CM

## 2024-07-03 DIAGNOSIS — M79.672 LEFT FOOT PAIN: ICD-10-CM

## 2024-07-03 PROCEDURE — 1160F RVW MEDS BY RX/DR IN RCRD: CPT | Mod: CPTII,S$GLB,, | Performed by: PODIATRIST

## 2024-07-03 PROCEDURE — 3008F BODY MASS INDEX DOCD: CPT | Mod: CPTII,S$GLB,, | Performed by: PODIATRIST

## 2024-07-03 PROCEDURE — 1159F MED LIST DOCD IN RCRD: CPT | Mod: CPTII,S$GLB,, | Performed by: PODIATRIST

## 2024-07-03 PROCEDURE — 3044F HG A1C LEVEL LT 7.0%: CPT | Mod: CPTII,S$GLB,, | Performed by: PODIATRIST

## 2024-07-03 PROCEDURE — 99213 OFFICE O/P EST LOW 20 MIN: CPT | Mod: S$GLB,,, | Performed by: PODIATRIST

## 2024-07-03 PROCEDURE — 1125F AMNT PAIN NOTED PAIN PRSNT: CPT | Mod: CPTII,S$GLB,, | Performed by: PODIATRIST

## 2024-07-03 PROCEDURE — 1101F PT FALLS ASSESS-DOCD LE1/YR: CPT | Mod: CPTII,S$GLB,, | Performed by: PODIATRIST

## 2024-07-03 PROCEDURE — 99999 PR PBB SHADOW E&M-EST. PATIENT-LVL III: CPT | Mod: PBBFAC,,, | Performed by: PODIATRIST

## 2024-07-03 PROCEDURE — 3288F FALL RISK ASSESSMENT DOCD: CPT | Mod: CPTII,S$GLB,, | Performed by: PODIATRIST

## 2024-07-03 RX ORDER — MELOXICAM 15 MG/1
15 TABLET ORAL DAILY
Qty: 30 TABLET | Refills: 3 | Status: SHIPPED | OUTPATIENT
Start: 2024-07-03

## 2024-07-03 NOTE — PROGRESS NOTES
"  [address]  [place] [address]  Phone: [phone]   [phone]    Patient's PCP:Jackie Alcaraz MD  Referring Provider: No ref. provider found    Subjective:      Chief Complaint:: Foot Pain and Heel Pain (LT foot heel pain)    Foot Pain  Pertinent negatives include no abdominal pain, arthralgias, chest pain, chills, coughing, fatigue, fever, headaches, joint swelling, myalgias, nausea, neck pain, numbness, rash or weakness.     Bailey Joseph is a 69 y.o. female who presents today with a complaint of LT foot heel pain. The current episode has been ongoing for over a year..  The symptoms include sharp, shooting, stabbing pain. Probable cause of complaint unknown.  The symptoms are aggravated by first few steps. The problem has worsened. Treatment to date have included insoles, injection from Dr. La, ice, stretches which provided some relief.       Vitals:    07/03/24 1340   Weight: 93.8 kg (206 lb 12.7 oz)   Height: 5' 8" (1.727 m)   PainSc:   3      Shoe Size: 9-9.5    Past Surgical History:   Procedure Laterality Date    BREAST BIOPSY Left     COLONOSCOPY N/A 05/10/2023    Procedure: COLONOSCOPY;  Surgeon: Angelica Washburn MD;  Location: [facility];  Service: Endoscopy;  Laterality: N/A;    GALLBLADDER SURGERY N/A     LAPAROSCOPIC CHOLECYSTECTOMY N/A 01/16/2023    Procedure: CHOLECYSTECTOMY, LAPAROSCOPIC;  Surgeon: Jean-Claude Salazar III, MD;  Location: [facility];  Service: General;  Laterality: N/A;    LIPOMA RESECTION      TONSILLECTOMY       Past Medical History:   Diagnosis Date    Anemia      Family History   Problem Relation Name Age of Onset    Diabetes Mother      Heart disease Mother      Lung cancer Father      Heart disease Father      Diabetes Sister      Diabetes Brother      Diabetes Brother          Social History:   Marital Status: Significant Other  Alcohol History:  reports current alcohol use.  Tobacco History:  reports that she has never smoked. She has been exposed " to tobacco smoke. She has never used smokeless tobacco.  Drug History:  reports no history of drug use.    Review of patient's allergies indicates:  Not on File    Current Outpatient Medications   Medication Sig Dispense Refill    ferrous sulfate, dried (SLOW FE) 160 mg (50 mg iron) TbSR Take 160 mg by mouth once daily.      meloxicam (MOBIC) 15 MG tablet Take 1 tablet (15 mg total) by mouth once daily. 30 tablet 3     No current facility-administered medications for this visit.       Review of Systems   Constitutional:  Negative for chills, fatigue, fever and unexpected weight change.   HENT:  Negative for hearing loss and trouble swallowing.    Eyes:  Negative for photophobia and visual disturbance.   Respiratory:  Negative for cough, shortness of breath and wheezing.    Cardiovascular:  Negative for chest pain, palpitations and leg swelling.   Gastrointestinal:  Negative for abdominal pain and nausea.   Genitourinary:  Negative for dysuria and frequency.   Musculoskeletal:  Negative for arthralgias, back pain, gait problem, joint swelling, myalgias and neck pain.   Skin:  Negative for rash and wound.   Neurological:  Negative for tremors, seizures, weakness, numbness and headaches.   Hematological:  Does not bruise/bleed easily.   Psychiatric/Behavioral:  Negative for hallucinations.          Objective:        Physical Exam:   Foot Exam    General  General Appearance: appears stated age and healthy   Orientation: alert and oriented to person, place, and time   Affect: appropriate   Gait: unimpaired       Left Foot/Ankle      Inspection and Palpation  Ecchymosis: none  Tenderness: plantar fascia   Swelling: none   Arch: normal  Hammertoes: absent  Claw toes: absent  Hallux valgus: no  Hallux limitus: no  Skin Exam: skin intact; no drainage, no ulcer and no erythema   Neurovascular  Dorsalis pedis: 2+  Posterior tibial: 2+  Capillary refill: 2+  Varicose veins: not present  Saphenous nerve sensation: normal  Tibial  nerve sensation: normal  Superficial peroneal nerve sensation: normal  Deep peroneal nerve sensation: normal  Sural nerve sensation: normal    Muscle Strength  Ankle dorsiflexion: 5  Ankle plantar flexion: 5  Ankle inversion: 5  Ankle eversion: 5  Great toe extension: 5  Great toe flexion: 5    Range of Motion    Normal left ankle ROM    Tests  Anterior drawer: negative   Talar tilt: negative   PT Tinel's sign: negative  Paresthesia: negative  Comments  Pain on palpation of the infeior medial heel. No pain present  with side to side compression of the calcaneus. Negative tinnel's sign  at the tarsal tunnel. Negative Monroe's nerve pain. Negative Calcaneal nerve pain. No soft tissue masses. Pain absent  with dorsiflexion of the ankle. No edema, erythema, or ecchymosis noted.       Physical Exam  Cardiovascular:      Pulses:           Dorsalis pedis pulses are 2+ on the left side.        Posterior tibial pulses are 2+ on the left side.   Musculoskeletal:      Left foot: No bunion.   Feet:      Left foot:      Skin integrity: No ulcer or erythema.               Left Ankle/Foot Exam     Range of Motion   The patient has normal left ankle ROM.     Comments:  Pain on palpation of the infeior medial heel. No pain present  with side to side compression of the calcaneus. Negative tinnel's sign  at the tarsal tunnel. Negative Monroe's nerve pain. Negative Calcaneal nerve pain. No soft tissue masses. Pain absent  with dorsiflexion of the ankle. No edema, erythema, or ecchymosis noted.         Muscle Strength   Left Lower Extremity   Ankle Dorsiflexion:  5   Plantar flexion:  5/5     Vascular Exam       Left Pulses  Dorsalis Pedis:      2+  Posterior Tibial:      2+           Imaging: none            Assessment:       1. Plantar fasciitis    2. Left foot pain      Plan:   Plantar fasciitis  -     meloxicam (MOBIC) 15 MG tablet; Take 1 tablet (15 mg total) by mouth once daily.  Dispense: 30 tablet; Refill: 3  -     MRI Foot  ([facility]) Left Without Contrast; Future; Expected date: 07/03/2024    Left foot pain  -     meloxicam (MOBIC) 15 MG tablet; Take 1 tablet (15 mg total) by mouth once daily.  Dispense: 30 tablet; Refill: 3  -     MRI Foot ([facility]) Left Without Contrast; Future; Expected date: 07/03/2024      Follow up if symptoms worsen or fail to improve, for pending MRI results.    Procedures        I discussed the patient that given the chronic heel pain she has been experiencing despite multiple prolonged conservative treatments I recommend MRI for further evaluation.  I am also going to send him meloxicam to see if this helps in the short term with her symptoms.      Counseling:     I provided patient education verbally regarding:   Patient diagnosis, treatment options, as well as alternatives, risks, and benefits.     This note was created using Dragon voice recognition software that occasionally misinterpreted phrases or words.

## 2024-07-08 ENCOUNTER — OFFICE VISIT (OUTPATIENT)
Dept: FAMILY MEDICINE | Facility: CLINIC | Age: 69
End: 2024-07-08
Payer: MEDICARE

## 2024-07-08 VITALS
OXYGEN SATURATION: 99 % | SYSTOLIC BLOOD PRESSURE: 126 MMHG | DIASTOLIC BLOOD PRESSURE: 68 MMHG | TEMPERATURE: 98 F | HEIGHT: 68 IN | HEART RATE: 76 BPM | WEIGHT: 206.38 LBS | BODY MASS INDEX: 31.28 KG/M2

## 2024-07-08 DIAGNOSIS — Z00.00 ENCOUNTER FOR PREVENTIVE HEALTH EXAMINATION: Primary | ICD-10-CM

## 2024-07-08 DIAGNOSIS — I70.0 AORTIC ARCH ATHEROSCLEROSIS: ICD-10-CM

## 2024-07-08 DIAGNOSIS — Z00.00 ENCOUNTER FOR MEDICARE ANNUAL WELLNESS EXAM: ICD-10-CM

## 2024-07-08 PROCEDURE — 1158F ADVNC CARE PLAN TLK DOCD: CPT | Mod: CPTII,S$GLB,, | Performed by: NURSE PRACTITIONER

## 2024-07-08 PROCEDURE — G0439 PPPS, SUBSEQ VISIT: HCPCS | Mod: S$GLB,,, | Performed by: NURSE PRACTITIONER

## 2024-07-08 PROCEDURE — 3288F FALL RISK ASSESSMENT DOCD: CPT | Mod: CPTII,S$GLB,, | Performed by: NURSE PRACTITIONER

## 2024-07-08 PROCEDURE — 99999 PR PBB SHADOW E&M-EST. PATIENT-LVL III: CPT | Mod: PBBFAC,,, | Performed by: NURSE PRACTITIONER

## 2024-07-08 PROCEDURE — 3074F SYST BP LT 130 MM HG: CPT | Mod: CPTII,S$GLB,, | Performed by: NURSE PRACTITIONER

## 2024-07-08 PROCEDURE — 1126F AMNT PAIN NOTED NONE PRSNT: CPT | Mod: CPTII,S$GLB,, | Performed by: NURSE PRACTITIONER

## 2024-07-08 PROCEDURE — 1159F MED LIST DOCD IN RCRD: CPT | Mod: CPTII,S$GLB,, | Performed by: NURSE PRACTITIONER

## 2024-07-08 PROCEDURE — 1170F FXNL STATUS ASSESSED: CPT | Mod: CPTII,S$GLB,, | Performed by: NURSE PRACTITIONER

## 2024-07-08 PROCEDURE — 3078F DIAST BP <80 MM HG: CPT | Mod: CPTII,S$GLB,, | Performed by: NURSE PRACTITIONER

## 2024-07-08 PROCEDURE — 1101F PT FALLS ASSESS-DOCD LE1/YR: CPT | Mod: CPTII,S$GLB,, | Performed by: NURSE PRACTITIONER

## 2024-07-08 PROCEDURE — 1160F RVW MEDS BY RX/DR IN RCRD: CPT | Mod: CPTII,S$GLB,, | Performed by: NURSE PRACTITIONER

## 2024-07-08 PROCEDURE — 3044F HG A1C LEVEL LT 7.0%: CPT | Mod: CPTII,S$GLB,, | Performed by: NURSE PRACTITIONER

## 2024-07-08 NOTE — PATIENT INSTRUCTIONS
Counseling and Referral of Other Preventative  (Italic type indicates deductible and co-insurance are waived)    Patient Name: Bailey Joseph  Today's Date: 7/8/2024    Health Maintenance       Date Due Completion Date    Shingles Vaccine (1 of 2) Never done ---    RSV Vaccine (Age 60+ and Pregnant patients) (1 - 1-dose 60+ series) Never done ---    Pneumococcal Vaccines (Age 65+) (1 of 1 - PCV) Never done ---    COVID-19 Vaccine (2 - 2023-24 season) 09/01/2023 3/31/2021    Influenza Vaccine (1) 09/01/2024 ---    Mammogram 01/09/2025 1/9/2024    Hemoglobin A1c (Prediabetes) 03/28/2025 3/28/2024    DEXA Scan 03/10/2026 3/10/2023    Lipid Panel 03/28/2029 3/28/2024    TETANUS VACCINE 08/01/2029 8/1/2019    Colorectal Cancer Screening 05/10/2033 5/10/2023        No orders of the defined types were placed in this encounter.    The following information is provided to all patients.  This information is to help you find resources for any of the problems found today that may be affecting your health:                  Living healthy guide: www.[facility].[place].gov      Understanding Diabetes: www.diabetes.org      Eating healthy: www.cdc.gov/healthyweight      CDC home safety checklist: www.cdc.gov/steadi/patient.html      Agency on Aging: www.goea.[place].[facility]      Alcoholics anonymous (AA): www.aa.org      Physical Activity: www.roberto.nih.gov/sw4xuzj      Tobacco use: www.quitwithusla.org

## 2024-07-08 NOTE — PROGRESS NOTES
"  Bailey Joseph presented for a  Medicare AWV and comprehensive Health Risk Assessment today. The following components were reviewed and updated:    Medical history  Family History  Social history  Allergies and Current Medications  Health Risk Assessment  Health Maintenance  Care Team         ** See Completed Assessments for Annual Wellness Visit within the encounter summary.**         The following assessments were completed:  Living Situation  CAGE  Depression Screening  Timed Get Up and Go  Whisper Test  Cognitive Function Screening  Nutrition Screening  ADL Screening  PAQ Screening    Clock in media   Opioid documentation:      Patient does not have a current opioid prescription.        Vitals:    07/08/24 1449   BP: 126/68   Pulse: 76   Temp: 98 °F (36.7 °C)   TempSrc: Oral   SpO2: 99%   Weight: 93.6 kg (206 lb 5.6 oz)   Height: 5' 8" (1.727 m)     Body mass index is 31.38 kg/m².  Physical Exam  Constitutional:       Appearance: She is well-developed.   HENT:      Head: Normocephalic and atraumatic.      Nose: Nose normal.   Eyes:      General: Lids are normal.      Conjunctiva/sclera: Conjunctivae normal.   Cardiovascular:      Rate and Rhythm: Normal rate.   Pulmonary:      Effort: Pulmonary effort is normal.   Neurological:      Mental Status: She is alert and oriented to person, place, and time.   Psychiatric:         Speech: Speech normal.         Behavior: Behavior normal.               Diagnoses and health risks identified today and associated recommendations/orders:    1. Encounter for Medicare annual wellness exam  Discussed health maintenance guidelines appropriate for age.        - Ambulatory Referral/Consult to Enhanced Annual Wellness Visit (eAWV)    2. Encounter for preventive health examination      3. Aortic arch atherosclerosis  Stable, continue to monitor  Bp well controlled   Followed by pcp       Provided Bailey with a 5-10 year written screening schedule and personal prevention plan. " Recommendations were developed using the USPSTF age appropriate recommendations. Education, counseling, and referrals were provided as needed. After Visit Summary printed and given to patient which includes a list of additional screenings\tests needed.    Follow up for One year for Annual Wellness Visit.    Afua Rubio, NP      I offered to discuss advanced care planning, including how to pick a person who would make decisions for you if you were unable to make them for yourself, called a health care power of , and what kind of decisions you might make such as use of life sustaining treatments such as ventilators and tube feeding when faced with a life limiting illness recorded on a living will that they will need to know. (How you want to be cared for as you near the end of your natural life)     X Patient is interested in learning more about how to make advanced directives.  I provided them paperwork and offered to discuss this with them.

## 2024-07-11 ENCOUNTER — PATIENT MESSAGE (OUTPATIENT)
Dept: PODIATRY | Facility: CLINIC | Age: 69
End: 2024-07-11
Payer: MEDICARE

## 2024-07-11 NOTE — TELEPHONE ENCOUNTER
Provided pt with number for imaging center via [employer] so she would be able to call and schedule.

## 2024-07-19 ENCOUNTER — HOSPITAL ENCOUNTER (OUTPATIENT)
Dept: RADIOLOGY | Facility: HOSPITAL | Age: 69
Discharge: HOME | End: 2024-07-19
Attending: PODIATRIST
Payer: MEDICARE

## 2024-07-19 DIAGNOSIS — M72.2 PLANTAR FASCIITIS: ICD-10-CM

## 2024-07-19 DIAGNOSIS — M79.672 LEFT FOOT PAIN: ICD-10-CM

## 2024-07-19 PROCEDURE — 73718 MRI LOWER EXTREMITY W/O DYE: CPT | Mod: 26,LT,, | Performed by: RADIOLOGY

## 2024-07-19 PROCEDURE — 73718 MRI LOWER EXTREMITY W/O DYE: CPT | Mod: TC,PO,LT

## 2024-07-29 ENCOUNTER — OFFICE VISIT (OUTPATIENT)
Dept: PODIATRY | Facility: CLINIC | Age: 69
End: 2024-07-29
Payer: MEDICARE

## 2024-07-29 VITALS — BODY MASS INDEX: 31.48 KG/M2 | HEIGHT: 68 IN | WEIGHT: 207.69 LBS

## 2024-07-29 DIAGNOSIS — M79.672 LEFT FOOT PAIN: ICD-10-CM

## 2024-07-29 DIAGNOSIS — M72.2 PLANTAR FASCIITIS: Primary | ICD-10-CM

## 2024-07-29 PROCEDURE — 1101F PT FALLS ASSESS-DOCD LE1/YR: CPT | Mod: CPTII,S$GLB,, | Performed by: PODIATRIST

## 2024-07-29 PROCEDURE — 1160F RVW MEDS BY RX/DR IN RCRD: CPT | Mod: CPTII,S$GLB,, | Performed by: PODIATRIST

## 2024-07-29 PROCEDURE — 99999 PR PBB SHADOW E&M-EST. PATIENT-LVL III: CPT | Mod: PBBFAC,,, | Performed by: PODIATRIST

## 2024-07-29 PROCEDURE — 3044F HG A1C LEVEL LT 7.0%: CPT | Mod: CPTII,S$GLB,, | Performed by: PODIATRIST

## 2024-07-29 PROCEDURE — 3008F BODY MASS INDEX DOCD: CPT | Mod: CPTII,S$GLB,, | Performed by: PODIATRIST

## 2024-07-29 PROCEDURE — 1159F MED LIST DOCD IN RCRD: CPT | Mod: CPTII,S$GLB,, | Performed by: PODIATRIST

## 2024-07-29 PROCEDURE — 3288F FALL RISK ASSESSMENT DOCD: CPT | Mod: CPTII,S$GLB,, | Performed by: PODIATRIST

## 2024-07-29 PROCEDURE — 1125F AMNT PAIN NOTED PAIN PRSNT: CPT | Mod: CPTII,S$GLB,, | Performed by: PODIATRIST

## 2024-07-29 PROCEDURE — 99213 OFFICE O/P EST LOW 20 MIN: CPT | Mod: S$GLB,,, | Performed by: PODIATRIST

## 2024-07-29 NOTE — PROGRESS NOTES
"  [address] [facility] [address]  [place] [address]  Phone: [phone]   [phone]    Patient's PCP:Jackie Alcaraz MD  Referring Provider: No ref. provider found    Subjective:      Chief Complaint:: Results (MRI results left foot )    JOHN Joseph is a 69 y.o. female who presents today to discuss MRI results for left foot. No new complaints. Pt presents today in [facility].     Vitals:    07/29/24 0830   Weight: 94.2 kg (207 lb 10.8 oz)   Height: 5' 8" (1.727 m)   PainSc:   3      Shoe Size: 9-9.5    Past Surgical History:   Procedure Laterality Date    BREAST BIOPSY Left     COLONOSCOPY N/A 05/10/2023    Procedure: COLONOSCOPY;  Surgeon: Angelica Washburn MD;  Location: [facility];  Service: Endoscopy;  Laterality: N/A;    GALLBLADDER SURGERY N/A     LAPAROSCOPIC CHOLECYSTECTOMY N/A 01/16/2023    Procedure: CHOLECYSTECTOMY, LAPAROSCOPIC;  Surgeon: Jean-Claude Salazar III, MD;  Location: [facility];  Service: General;  Laterality: N/A;    LIPOMA RESECTION      TONSILLECTOMY       Past Medical History:   Diagnosis Date    Anemia      Family History   Problem Relation Name Age of Onset    Diabetes Mother      Heart disease Mother      Lung cancer Father      Heart disease Father      Diabetes Sister      Diabetes Brother      Diabetes Brother          Social History:   Marital Status: Significant Other  Alcohol History:  reports current alcohol use.  Tobacco History:  reports that she has never smoked. She has been exposed to tobacco smoke. She has never used smokeless tobacco.  Drug History:  reports no history of drug use.    Review of patient's allergies indicates:  No Known Allergies    Current Outpatient Medications   Medication Sig Dispense Refill    ferrous sulfate, dried (SLOW FE) 160 mg (50 mg iron) TbSR Take 160 mg by mouth once daily. (Patient not taking: Reported on 7/29/2024)      meloxicam (MOBIC) 15 MG tablet Take 1 tablet (15 mg total) by mouth once daily. (Patient not taking: Reported on " 7/29/2024) 30 tablet 3     No current facility-administered medications for this visit.       Review of Systems   Constitutional:  Negative for chills, fatigue, fever and unexpected weight change.   HENT:  Negative for hearing loss and trouble swallowing.    Eyes:  Negative for photophobia and visual disturbance.   Respiratory:  Negative for cough, shortness of breath and wheezing.    Cardiovascular:  Negative for chest pain, palpitations and leg swelling.   Gastrointestinal:  Negative for abdominal pain and nausea.   Genitourinary:  Negative for dysuria and frequency.   Musculoskeletal:  Negative for arthralgias, back pain, gait problem, joint swelling, myalgias and neck pain.   Skin:  Negative for rash and wound.   Neurological:  Negative for tremors, seizures, weakness, numbness and headaches.   Hematological:  Does not bruise/bleed easily.         Objective:        Physical Exam:   Foot Exam    General  General Appearance: appears stated age and healthy   Orientation: alert and oriented to person, place, and time   Affect: appropriate   Gait: unimpaired       Left Foot/Ankle      Inspection and Palpation  Ecchymosis: none  Tenderness: plantar fascia   Swelling: none   Arch: normal  Hammertoes: absent  Claw toes: absent  Hallux valgus: no  Hallux limitus: no  Skin Exam: skin intact; no drainage, no ulcer and no erythema   Neurovascular  Dorsalis pedis: 2+  Posterior tibial: 2+  Capillary refill: 2+  Varicose veins: not present  Saphenous nerve sensation: normal  Tibial nerve sensation: normal  Superficial peroneal nerve sensation: normal  Deep peroneal nerve sensation: normal  Sural nerve sensation: normal    Muscle Strength  Ankle dorsiflexion: 5  Ankle plantar flexion: 5  Ankle inversion: 5  Ankle eversion: 5  Great toe extension: 5  Great toe flexion: 5    Range of Motion    Normal left ankle ROM    Tests  Anterior drawer: negative   Talar tilt: negative   PT Tinel's sign: negative  Paresthesia:  negative  Comments  Pain on palpation of the infeior medial heel. No pain present  with side to side compression of the calcaneus. Negative tinnel's sign  at the tarsal tunnel. Negative Monroe's nerve pain. Negative Calcaneal nerve pain. No soft tissue masses. Pain absent  with dorsiflexion of the ankle. No edema, erythema, or ecchymosis noted.       Physical Exam  Cardiovascular:      Pulses:           Dorsalis pedis pulses are 2+ on the left side.        Posterior tibial pulses are 2+ on the left side.   Musculoskeletal:      Left foot: No bunion.   Feet:      Left foot:      Skin integrity: No ulcer or erythema.               Left Ankle/Foot Exam     Range of Motion   The patient has normal left ankle ROM.     Comments:  Pain on palpation of the infeior medial heel. No pain present  with side to side compression of the calcaneus. Negative tinnel's sign  at the tarsal tunnel. Negative Monroe's nerve pain. Negative Calcaneal nerve pain. No soft tissue masses. Pain absent  with dorsiflexion of the ankle. No edema, erythema, or ecchymosis noted.         Muscle Strength   Left Lower Extremity   Ankle Dorsiflexion:  5   Plantar flexion:  5/5     Vascular Exam       Left Pulses  Dorsalis Pedis:      2+  Posterior Tibial:      2+           Imaging: MRI Foot (Hindfoot) Left Without Contrast  Narrative: EXAMINATION:  MRI FOOT (HINDFOOT) LEFT WITHOUT CONTRAST    CLINICAL HISTORY:  Heel pain, chronic; Plantar fascial fibromatosis    TECHNIQUE:  Left foot, with attention to the hindfoot, MRI without IV contrast.    COMPARISON:  Left foot radiographs 05/23/2022    FINDINGS:  Prominent focal subcortical bone marrow signal duration consisting of small geodes and associated increased T2 and low T1 bone marrow signal suggesting edema affects posteromedial corner of talar dome.  Although it not elegantly demonstrated, possibly due to volume averaging, overlying full-thickness cartilage defect is suggested.    No additional  tibiotalar joint articular cartilage defect.  No cartilage defect or osteochondral lesion throughout the subtalar joint.  The joint contains physiologic amounts of fluid.    Minor degenerative subcortical bone marrow signal alteration affects dorsal 2nd metatarsal base adjacent to the 2nd TMT joint.    Mild attenuation of intact left anterior talofibular ligament fibers is evident.  Calcaneofibular and posterior talofibular ligaments are normal.  Anteroinferior and posteroinferior tibiofibular ligaments are intact.  Deltoid ligament complex is intact.  Lisfranc ligament is normal.    Anterior extensor, medial flexor, and lateral peroneal tendons are intact.  Tiny amount of fluid lies adjacent to the peroneal tendons.  Achilles tendon is intact with trace fluid in retrocalcaneal bursa.    Focal prominent fusiform enlargement and intermediate intrasubstance signal affects plantar fascia origin as it arises from medial calcaneal tubercle.  Deep to such, 5 mm calcaneal enthesophyte is present.  Mild perifascial edema is present, and some fluid like increased T2 linear signal affecting 50% of deep plantar fascia origin fibers suggest intermediate-grade partial-thickness tear.  Distally, remainder of the plantar fascia shows no additional abnormality.    Visualized muscles maintain normal signal.  Normal fat lies within the sinus tarsi.  Tarsal tunnel is unremarkable.    Very mild subcutaneous edema occurs about the left ankle.  Impression: 1. Moderate acute on moderate to severe chronic plantar fasciitis, with intermediate grade partial-thickness tear affecting deep fibers near its origin.  2. Suspected small osteochondral lesion affecting posteromedial corner of talar dome, with underlying subcortical geodes and reactive bone marrow edema.  3. Mild attenuation of left anterior talofibular ligament suggest sequelae of chronic low-grade partial-thickness tear.  4. Mild left peroneal tenosynovitis.    Electronically signed  by: Saeed Salazar  Date:    07/19/2024  Time:    08:29               Assessment:       1. Plantar fasciitis    2. Left foot pain      Plan:   Plantar fasciitis  -     AIR CAST WALKER BOOT FOR HOME USE    Left foot pain  -     AIR CAST WALKER BOOT FOR HOME USE      Follow up if symptoms worsen or fail to improve.    Procedures        MRI findings and images were reviewed in detail with the patient.  I explained the findings of chronic plantar fasciitis with thickening of the medial band.  We discussed different ongoing treatment options for this.  We discussed both conservative and surgical treatment options.  At this time patient would like to try a Cam Walker boot.  I discussed there that if this does not alleviate her symptoms over the next several weeks given the chronic nature of her pain that surgical intervention should be considered.    Counseling:     I provided patient education verbally regarding:   Patient diagnosis, treatment options, as well as alternatives, risks, and benefits.     This note was created using Dragon voice recognition software that occasionally misinterpreted phrases or words.

## 2024-07-29 NOTE — PATIENT INSTRUCTIONS

## 2024-07-30 ENCOUNTER — OFFICE VISIT (OUTPATIENT)
Dept: FAMILY MEDICINE | Facility: CLINIC | Age: 69
End: 2024-07-30
Payer: MEDICARE

## 2024-07-30 ENCOUNTER — TELEPHONE (OUTPATIENT)
Dept: FAMILY MEDICINE | Facility: CLINIC | Age: 69
End: 2024-07-30

## 2024-07-30 VITALS
SYSTOLIC BLOOD PRESSURE: 126 MMHG | OXYGEN SATURATION: 97 % | HEIGHT: 68 IN | HEART RATE: 67 BPM | WEIGHT: 207 LBS | BODY MASS INDEX: 31.37 KG/M2 | DIASTOLIC BLOOD PRESSURE: 70 MMHG | RESPIRATION RATE: 16 BRPM

## 2024-07-30 DIAGNOSIS — M72.2 PLANTAR FASCIITIS: ICD-10-CM

## 2024-07-30 DIAGNOSIS — G47.33 OSA (OBSTRUCTIVE SLEEP APNEA): ICD-10-CM

## 2024-07-30 DIAGNOSIS — I70.0 AORTIC ARCH ATHEROSCLEROSIS: ICD-10-CM

## 2024-07-30 DIAGNOSIS — R73.03 PRE-DIABETES: ICD-10-CM

## 2024-07-30 DIAGNOSIS — E66.9 OBESITY (BMI 30.0-34.9): Primary | ICD-10-CM

## 2024-07-30 PROCEDURE — 1101F PT FALLS ASSESS-DOCD LE1/YR: CPT | Mod: CPTII,S$GLB,,

## 2024-07-30 PROCEDURE — 3074F SYST BP LT 130 MM HG: CPT | Mod: CPTII,S$GLB,,

## 2024-07-30 PROCEDURE — 1159F MED LIST DOCD IN RCRD: CPT | Mod: CPTII,S$GLB,,

## 2024-07-30 PROCEDURE — 1160F RVW MEDS BY RX/DR IN RCRD: CPT | Mod: CPTII,S$GLB,,

## 2024-07-30 PROCEDURE — 3288F FALL RISK ASSESSMENT DOCD: CPT | Mod: CPTII,S$GLB,,

## 2024-07-30 PROCEDURE — 99213 OFFICE O/P EST LOW 20 MIN: CPT | Mod: S$GLB,,,

## 2024-07-30 PROCEDURE — 99999 PR PBB SHADOW E&M-EST. PATIENT-LVL III: CPT | Mod: PBBFAC,,,

## 2024-07-30 PROCEDURE — 1125F AMNT PAIN NOTED PAIN PRSNT: CPT | Mod: CPTII,S$GLB,,

## 2024-07-30 PROCEDURE — 3008F BODY MASS INDEX DOCD: CPT | Mod: CPTII,S$GLB,,

## 2024-07-30 PROCEDURE — 3044F HG A1C LEVEL LT 7.0%: CPT | Mod: CPTII,S$GLB,,

## 2024-07-30 PROCEDURE — 3078F DIAST BP <80 MM HG: CPT | Mod: CPTII,S$GLB,,

## 2024-07-30 RX ORDER — SEMAGLUTIDE 0.25 MG/.5ML
0.25 INJECTION, SOLUTION SUBCUTANEOUS WEEKLY
Qty: 3 ML | Refills: 0 | Status: SHIPPED | OUTPATIENT
Start: 2024-07-30 | End: 2024-07-30

## 2024-07-30 RX ORDER — SEMAGLUTIDE 0.25 MG/.5ML
0.25 INJECTION, SOLUTION SUBCUTANEOUS WEEKLY
Qty: 3 ML | Refills: 0 | Status: SHIPPED | OUTPATIENT
Start: 2024-07-30

## 2024-07-30 NOTE — Clinical Note
Please schedule one month follow-up with me.. I forgot to get her schedule before she left...  Thanks

## 2024-07-30 NOTE — TELEPHONE ENCOUNTER
----- Message from Dayami Laughlin PA-C sent at 7/30/2024  9:29 AM CDT -----  Please schedule one month follow-up with me.. I forgot to get her schedule before she left...   Thanks

## 2024-07-30 NOTE — PROGRESS NOTES
Subjective:       Patient ID:  [id number]     Chief Complaint: Weight Loss      Bailey Reyes a 69 y.o. female who presents to the clinic for weight loss aid.    Patient reports that she has been attempting to lose weight for several years now.  She reports that her weight will go up and down despite watching her diet in increasing physical activity.  Patient reports that she has plantar fasciitis that makes exercise difficult.  She is interested in a weight loss a to hopefully improve foot pain, sleep apnea, and prediabetes.  She denies history of thyroid cancer or pancreatitis.  She has no other concerns today.    Past Medical History:   Diagnosis Date    Anemia       Active Problem List with Overview Notes    Diagnosis Date Noted    BAO (obstructive sleep apnea) 02/06/2023    Acute cholecystitis 01/16/2023    Paresthesias 05/04/2021    Pre-diabetes 05/04/2021    Iron deficiency 05/04/2021      Review of patient's allergies indicates:  No Known Allergies      Current Outpatient Medications:     ferrous sulfate, dried (SLOW FE) 160 mg (50 mg iron) TbSR, Take 160 mg by mouth once daily., Disp: , Rfl:     meloxicam (MOBIC) 15 MG tablet, Take 1 tablet (15 mg total) by mouth once daily., Disp: 30 tablet, Rfl: 3    semaglutide, weight loss, (WEGOVY) 0.25 mg/0.5 mL PnIj, Inject 0.25 mg into the skin once a week., Disp: 3 mL, Rfl: 0    Lab Results   Component Value Date    WBC 3.86 (L) 03/28/2024    HGB 12.2 03/28/2024    HCT 39.2 03/28/2024     03/28/2024    CHOL 192 03/28/2024    TRIG 87 03/28/2024    HDL 43 03/28/2024    ALT 24 03/28/2024    AST 29 03/28/2024     03/28/2024    K 4.0 03/28/2024     03/28/2024    CREATININE 0.8 03/28/2024    BUN 11 03/28/2024    CO2 23 03/28/2024    TSH 2.290 03/28/2024    HGBA1C 5.9 (H) 03/28/2024       Review of Systems   Constitutional:  Negative for fatigue and fever.   HENT: Negative.  Negative for congestion, sneezing and sore throat.    Eyes: Negative.     Respiratory:  Negative for cough, shortness of breath and wheezing.    Cardiovascular:  Negative for chest pain, palpitations and leg swelling.   Gastrointestinal:  Negative for abdominal pain, nausea and vomiting.   Genitourinary: Negative.    Musculoskeletal: Negative.  Negative for arthralgias.   Skin: Negative.  Negative for rash.   Neurological:  Negative for dizziness, weakness, light-headedness, numbness and headaches.   Hematological: Negative.    Psychiatric/Behavioral: Negative.     All other systems reviewed and are negative.      Objective:      Physical Exam  Constitutional:       Appearance: Normal appearance.   HENT:      Head: Normocephalic and atraumatic.      Nose: Nose normal.   Eyes:      Extraocular Movements: Extraocular movements intact.   Cardiovascular:      Rate and Rhythm: Normal rate and regular rhythm.   Pulmonary:      Effort: Pulmonary effort is normal.      Breath sounds: Normal breath sounds.   Musculoskeletal:         General: Normal range of motion.      Cervical back: Normal range of motion.   Skin:     General: Skin is warm and dry.   Neurological:      General: No focal deficit present.      Mental Status: She is alert and oriented to person, place, and time.   Psychiatric:         Mood and Affect: Mood normal.         Assessment:       1. Obesity (BMI 30.0-34.9)    2. Aortic arch atherosclerosis    3. Pre-diabetes    4. BAO (obstructive sleep apnea)    5. Plantar fasciitis        Plan:       Bailey was seen today for weight loss.    Diagnoses and all orders for this visit:    Obesity (BMI 30.0-34.9)  -     semaglutide, weight loss, (WEGOVY) 0.25 mg/0.5 mL PnIj; Inject 0.25 mg into the skin once a week.  - if Wegovy is not covered we will consider Wellbutrin vs metformin.  - well-balanced diet and exercise    Aortic arch atherosclerosis  -     semaglutide, weight loss, (WEGOVY) 0.25 mg/0.5 mL PnIj; Inject 0.25 mg into the skin once a week.    Pre-diabetes  - A1C 5.9 3/2024  -      semaglutide, weight loss, (WEGOVY) 0.25 mg/0.5 mL PnIj; Inject 0.25 mg into the skin once a week.  - low carb diet and exercise    BAO (obstructive sleep apnea)  -     semaglutide, weight loss, (WEGOVY) 0.25 mg/0.5 mL PnIj; Inject 0.25 mg into the skin once a week.    Plantar fasciitis  - continue to follow up with Podiatry.      One-month follow up with me.  Keep upcoming appointment with Dr. Alcaraz.    Portions of this note were dictated using voice recognition software and may contain dictation related errors in spelling / grammar / syntax not discovered on text review.     Dayami Laughlin PA-C

## 2024-07-31 ENCOUNTER — PATIENT MESSAGE (OUTPATIENT)
Dept: FAMILY MEDICINE | Facility: CLINIC | Age: 69
End: 2024-07-31
Payer: MEDICARE

## 2024-07-31 DIAGNOSIS — E66.9 OBESITY (BMI 30.0-34.9): Primary | ICD-10-CM

## 2024-07-31 DIAGNOSIS — M79.672 LEFT FOOT PAIN: ICD-10-CM

## 2024-07-31 DIAGNOSIS — M72.2 PLANTAR FASCIITIS: ICD-10-CM

## 2024-07-31 RX ORDER — MELOXICAM 15 MG/1
15 TABLET ORAL DAILY
Qty: 30 TABLET | Refills: 3 | Status: SHIPPED | OUTPATIENT
Start: 2024-07-31

## 2024-07-31 RX ORDER — BUPROPION HYDROCHLORIDE 150 MG/1
150 TABLET ORAL DAILY
Qty: 90 TABLET | Refills: 1 | Status: SHIPPED | OUTPATIENT
Start: 2024-07-31 | End: 2025-01-27

## 2024-07-31 NOTE — TELEPHONE ENCOUNTER
Discussed initiation of Wellbutrin at appt to help with food cravings. Patient declines hx of seizures. Keep upcoming appt with Dr. Alcaraz

## 2024-08-21 ENCOUNTER — PATIENT MESSAGE (OUTPATIENT)
Dept: PODIATRY | Facility: CLINIC | Age: 69
End: 2024-08-21
Payer: MEDICARE

## 2024-08-26 ENCOUNTER — OFFICE VISIT (OUTPATIENT)
Dept: PODIATRY | Facility: CLINIC | Age: 69
End: 2024-08-26
Payer: MEDICARE

## 2024-08-26 VITALS — BODY MASS INDEX: 31.44 KG/M2 | WEIGHT: 206.81 LBS

## 2024-08-26 DIAGNOSIS — M79.672 LEFT FOOT PAIN: ICD-10-CM

## 2024-08-26 DIAGNOSIS — M72.2 PLANTAR FASCIITIS: Primary | ICD-10-CM

## 2024-08-26 PROCEDURE — 99214 OFFICE O/P EST MOD 30 MIN: CPT | Mod: 25,S$GLB,, | Performed by: PODIATRIST

## 2024-08-26 PROCEDURE — 1159F MED LIST DOCD IN RCRD: CPT | Mod: CPTII,S$GLB,, | Performed by: PODIATRIST

## 2024-08-26 PROCEDURE — 20550 NJX 1 TENDON SHEATH/LIGAMENT: CPT | Mod: LT,S$GLB,, | Performed by: PODIATRIST

## 2024-08-26 PROCEDURE — 3008F BODY MASS INDEX DOCD: CPT | Mod: CPTII,S$GLB,, | Performed by: PODIATRIST

## 2024-08-26 PROCEDURE — 99999 PR PBB SHADOW E&M-EST. PATIENT-LVL III: CPT | Mod: PBBFAC,,, | Performed by: PODIATRIST

## 2024-08-26 PROCEDURE — 1101F PT FALLS ASSESS-DOCD LE1/YR: CPT | Mod: CPTII,S$GLB,, | Performed by: PODIATRIST

## 2024-08-26 PROCEDURE — 3288F FALL RISK ASSESSMENT DOCD: CPT | Mod: CPTII,S$GLB,, | Performed by: PODIATRIST

## 2024-08-26 PROCEDURE — 1160F RVW MEDS BY RX/DR IN RCRD: CPT | Mod: CPTII,S$GLB,, | Performed by: PODIATRIST

## 2024-08-26 PROCEDURE — 3044F HG A1C LEVEL LT 7.0%: CPT | Mod: CPTII,S$GLB,, | Performed by: PODIATRIST

## 2024-08-26 PROCEDURE — 1125F AMNT PAIN NOTED PAIN PRSNT: CPT | Mod: CPTII,S$GLB,, | Performed by: PODIATRIST

## 2024-08-26 RX ORDER — BUPIVACAINE HYDROCHLORIDE 5 MG/ML
1.5 INJECTION, SOLUTION PERINEURAL
Status: COMPLETED | OUTPATIENT
Start: 2024-08-26 | End: 2024-08-26

## 2024-08-26 RX ORDER — METHYLPREDNISOLONE ACETATE 40 MG/ML
20 INJECTION, SUSPENSION INTRA-ARTICULAR; INTRALESIONAL; INTRAMUSCULAR; SOFT TISSUE
Status: COMPLETED | OUTPATIENT
Start: 2024-08-26 | End: 2024-08-26

## 2024-08-26 RX ORDER — DEXAMETHASONE SODIUM PHOSPHATE 4 MG/ML
4 INJECTION, SOLUTION INTRA-ARTICULAR; INTRALESIONAL; INTRAMUSCULAR; INTRAVENOUS; SOFT TISSUE
Status: COMPLETED | OUTPATIENT
Start: 2024-08-26 | End: 2024-08-26

## 2024-08-26 RX ADMIN — BUPIVACAINE HYDROCHLORIDE 7.5 MG: 5 INJECTION, SOLUTION PERINEURAL at 09:08

## 2024-08-26 RX ADMIN — DEXAMETHASONE SODIUM PHOSPHATE 4 MG: 4 INJECTION, SOLUTION INTRA-ARTICULAR; INTRALESIONAL; INTRAMUSCULAR; INTRAVENOUS; SOFT TISSUE at 09:08

## 2024-08-26 RX ADMIN — METHYLPREDNISOLONE ACETATE 20 MG: 40 INJECTION, SUSPENSION INTRA-ARTICULAR; INTRALESIONAL; INTRAMUSCULAR; SOFT TISSUE at 09:08

## 2024-08-26 NOTE — PATIENT INSTRUCTIONS

## 2024-08-26 NOTE — PROGRESS NOTES
[address] [facility] [address]  [place] [address]  Phone: [phone]   [phone]    Patient's PCP:Jackie Alcaraz MD  Referring Provider: No ref. provider found    Subjective:      Chief Complaint:: Surgical Consult (Left foot pain-Plantar fasciitis/)    JOHN Joseph is a 69 y.o. female who presents today for a surgical consult. Pt stated she wears the boot on and off and states she doesn't feel like its really helping her. Pt presents in [facility] today.       Vitals:    08/26/24 0923   Weight: 93.8 kg (206 lb 12.7 oz)   PainSc:   4      Shoe Size: 9-9.5    Past Surgical History:   Procedure Laterality Date    BREAST BIOPSY Left     COLONOSCOPY N/A 05/10/2023    Procedure: COLONOSCOPY;  Surgeon: Angelica Washburn MD;  Location: [facility];  Service: Endoscopy;  Laterality: N/A;    GALLBLADDER SURGERY N/A     LAPAROSCOPIC CHOLECYSTECTOMY N/A 01/16/2023    Procedure: CHOLECYSTECTOMY, LAPAROSCOPIC;  Surgeon: Jean-Claude Salazar III, MD;  Location: [facility];  Service: General;  Laterality: N/A;    LIPOMA RESECTION      TONSILLECTOMY       Past Medical History:   Diagnosis Date    Anemia      Family History   Problem Relation Name Age of Onset    Diabetes Mother      Heart disease Mother      Lung cancer Father      Heart disease Father      Diabetes Sister      Diabetes Brother      Diabetes Brother          Social History:   Marital Status: Significant Other  Alcohol History:  reports current alcohol use.  Tobacco History:  reports that she has never smoked. She has been exposed to tobacco smoke. She has never used smokeless tobacco.  Drug History:  reports no history of drug use.    Review of patient's allergies indicates:  No Known Allergies    Current Outpatient Medications   Medication Sig Dispense Refill    buPROPion (WELLBUTRIN XL) 150 MG TB24 tablet Take 1 tablet (150 mg total) by mouth once daily. 90 tablet 1    ferrous sulfate, dried (SLOW FE) 160 mg (50 mg iron) TbSR Take 160 mg by mouth once  daily.      meloxicam (MOBIC) 15 MG tablet Take 1 tablet (15 mg total) by mouth once daily. 30 tablet 3     No current facility-administered medications for this visit.       Review of Systems   Constitutional:  Negative for chills, fatigue, fever and unexpected weight change.   HENT:  Negative for hearing loss and trouble swallowing.    Eyes:  Negative for photophobia and visual disturbance.   Respiratory:  Negative for cough, shortness of breath and wheezing.    Cardiovascular:  Negative for chest pain, palpitations and leg swelling.   Gastrointestinal:  Negative for abdominal pain and nausea.   Genitourinary:  Negative for dysuria and frequency.   Musculoskeletal:  Negative for arthralgias, back pain, gait problem, joint swelling, myalgias and neck pain.   Skin:  Negative for rash and wound.   Neurological:  Negative for tremors, seizures, weakness, numbness and headaches.   Hematological:  Does not bruise/bleed easily.         Objective:        Physical Exam:   Foot Exam    General  General Appearance: appears stated age and healthy   Orientation: alert and oriented to person, place, and time   Affect: appropriate   Gait: unimpaired       Left Foot/Ankle      Inspection and Palpation  Ecchymosis: none  Tenderness: plantar fascia   Swelling: none   Arch: normal  Hammertoes: absent  Claw toes: absent  Hallux valgus: no  Hallux limitus: no  Skin Exam: skin intact; no drainage, no ulcer and no erythema   Neurovascular  Dorsalis pedis: 2+  Posterior tibial: 2+  Capillary refill: 2+  Varicose veins: not present  Saphenous nerve sensation: normal  Tibial nerve sensation: normal  Superficial peroneal nerve sensation: normal  Deep peroneal nerve sensation: normal  Sural nerve sensation: normal    Muscle Strength  Ankle dorsiflexion: 5  Ankle plantar flexion: 5  Ankle inversion: 5  Ankle eversion: 5  Great toe extension: 5  Great toe flexion: 5    Range of Motion    Normal left ankle ROM    Tests  Anterior drawer:  negative   Talar tilt: negative   PT Tinel's sign: negative  Paresthesia: negative  Comments  Pain on palpation of the infeior medial heel. No pain present  with side to side compression of the calcaneus. Negative tinnel's sign  at the tarsal tunnel. Negative Monroe's nerve pain. Negative Calcaneal nerve pain. No soft tissue masses. Pain absent  with dorsiflexion of the ankle. No edema, erythema, or ecchymosis noted.       Physical Exam  Cardiovascular:      Pulses:           Dorsalis pedis pulses are 2+ on the left side.        Posterior tibial pulses are 2+ on the left side.   Musculoskeletal:      Left foot: No bunion.   Feet:      Left foot:      Skin integrity: No ulcer or erythema.               Left Ankle/Foot Exam     Range of Motion   The patient has normal left ankle ROM.     Comments:  Pain on palpation of the infeior medial heel. No pain present  with side to side compression of the calcaneus. Negative tinnel's sign  at the tarsal tunnel. Negative Monroe's nerve pain. Negative Calcaneal nerve pain. No soft tissue masses. Pain absent  with dorsiflexion of the ankle. No edema, erythema, or ecchymosis noted.         Muscle Strength   Left Lower Extremity   Ankle Dorsiflexion:  5   Plantar flexion:  5/5     Vascular Exam       Left Pulses  Dorsalis Pedis:      2+  Posterior Tibial:      2+           Imaging: MRI Foot (Hindfoot) Left Without Contrast  Narrative: EXAMINATION:  MRI FOOT (HINDFOOT) LEFT WITHOUT CONTRAST    CLINICAL HISTORY:  Heel pain, chronic; Plantar fascial fibromatosis    TECHNIQUE:  Left foot, with attention to the hindfoot, MRI without IV contrast.    COMPARISON:  Left foot radiographs 05/23/2022    FINDINGS:  Prominent focal subcortical bone marrow signal duration consisting of small geodes and associated increased T2 and low T1 bone marrow signal suggesting edema affects posteromedial corner of talar dome.  Although it not elegantly demonstrated, possibly due to volume averaging,  overlying full-thickness cartilage defect is suggested.    No additional tibiotalar joint articular cartilage defect.  No cartilage defect or osteochondral lesion throughout the subtalar joint.  The joint contains physiologic amounts of fluid.    Minor degenerative subcortical bone marrow signal alteration affects dorsal 2nd metatarsal base adjacent to the 2nd TMT joint.    Mild attenuation of intact left anterior talofibular ligament fibers is evident.  Calcaneofibular and posterior talofibular ligaments are normal.  Anteroinferior and posteroinferior tibiofibular ligaments are intact.  Deltoid ligament complex is intact.  Lisfranc ligament is normal.    Anterior extensor, medial flexor, and lateral peroneal tendons are intact.  Tiny amount of fluid lies adjacent to the peroneal tendons.  Achilles tendon is intact with trace fluid in retrocalcaneal bursa.    Focal prominent fusiform enlargement and intermediate intrasubstance signal affects plantar fascia origin as it arises from medial calcaneal tubercle.  Deep to such, 5 mm calcaneal enthesophyte is present.  Mild perifascial edema is present, and some fluid like increased T2 linear signal affecting 50% of deep plantar fascia origin fibers suggest intermediate-grade partial-thickness tear.  Distally, remainder of the plantar fascia shows no additional abnormality.    Visualized muscles maintain normal signal.  Normal fat lies within the sinus tarsi.  Tarsal tunnel is unremarkable.    Very mild subcutaneous edema occurs about the left ankle.  Impression: 1. Moderate acute on moderate to severe chronic plantar fasciitis, with intermediate grade partial-thickness tear affecting deep fibers near its origin.  2. Suspected small osteochondral lesion affecting posteromedial corner of talar dome, with underlying subcortical geodes and reactive bone marrow edema.  3. Mild attenuation of left anterior talofibular ligament suggest sequelae of chronic low-grade  partial-thickness tear.  4. Mild left peroneal tenosynovitis.    Electronically signed by: Saeed Salazar  Date:    07/19/2024  Time:    08:29               Assessment:       1. Plantar fasciitis    2. Left foot pain      Plan:   Plantar fasciitis  -     methylPREDNISolone acetate injection 20 mg  -     BUPivacaine injection 7.5 mg  -     dexAMETHasone injection 4 mg    Left foot pain  -     methylPREDNISolone acetate injection 20 mg  -     BUPivacaine injection 7.5 mg  -     dexAMETHasone injection 4 mg      Follow up if symptoms worsen or fail to improve, for pt will be scheduled for outpatient surgery.    I had an at length discussion with the patient today regarding her chronic ongoing plantar fasciitis.  We discussed different treatment options going forward.  At this time given the chronic nature of his symptoms nonresponsive to conservative care patient would like to proceed with surgical intervention.  We are also going to do a steroid injection today to give her temporary relief until surgery.      Procedures Informed consent was obtained.  Time-out was called.  The area was prepped with alcohol, and a steroid injection was performed at left plantar fascia using 1.5 cc of 0.5% Marcaine w/out epi, 1 cc Dexamethasone, 0.5 cc Methylprednisolone. Patient tolerated the procedure well.           Patient was educated about the entire pre, kavon and post-operative time period.  They  were educated about the pro's and con's of surgery.  All conservative measures have been exhausted. Patient understands the particular risks involved which may occur in connection with the procedure proposed including pain, swelling, infection, stiffness, decreased ROM, recurrence, rejection, numbness, delayed healing, scar formation.    Patient was educated that their diagnosis may be surgically treated.  The patient's problem will probably advance and usually will not get better without surgery.  All of the pre-operative treatment plans  have been exhausted or will no longer be successful at this point in time.  Patient was told of the possible outcomes and expectations of the surgical procedure.  They  will need to be followed-up post-operatively.  Today, pictures were drawn, questions answered.      We discussed the following surgical procedures:  Endoscopic plantar fasciotomy, left foot         Counseling:     I provided patient education verbally regarding:   Patient diagnosis, treatment options, as well as alternatives, risks, and benefits.     This note was created using Dragon voice recognition software that occasionally misinterpreted phrases or words.

## 2024-08-30 ENCOUNTER — OFFICE VISIT (OUTPATIENT)
Dept: FAMILY MEDICINE | Facility: CLINIC | Age: 69
End: 2024-08-30
Payer: MEDICARE

## 2024-08-30 VITALS
HEART RATE: 72 BPM | DIASTOLIC BLOOD PRESSURE: 60 MMHG | BODY MASS INDEX: 31.64 KG/M2 | OXYGEN SATURATION: 97 % | SYSTOLIC BLOOD PRESSURE: 120 MMHG | RESPIRATION RATE: 16 BRPM | WEIGHT: 208.75 LBS | HEIGHT: 68 IN

## 2024-08-30 DIAGNOSIS — M54.9 BACK PAIN, UNSPECIFIED BACK LOCATION, UNSPECIFIED BACK PAIN LATERALITY, UNSPECIFIED CHRONICITY: Primary | ICD-10-CM

## 2024-08-30 DIAGNOSIS — E66.9 OBESITY (BMI 30.0-34.9): ICD-10-CM

## 2024-08-30 PROCEDURE — 99999 PR PBB SHADOW E&M-EST. PATIENT-LVL III: CPT | Mod: PBBFAC,,,

## 2024-08-30 RX ORDER — NALTREXONE HYDROCHLORIDE 50 MG/1
TABLET, FILM COATED ORAL
Qty: 90 TABLET | Refills: 0 | Status: SHIPPED | OUTPATIENT
Start: 2024-08-30

## 2024-08-30 RX ORDER — METHOCARBAMOL 500 MG/1
500 TABLET, FILM COATED ORAL 4 TIMES DAILY
Qty: 40 TABLET | Refills: 0 | Status: SHIPPED | OUTPATIENT
Start: 2024-08-30 | End: 2024-09-09

## 2024-08-30 NOTE — PROGRESS NOTES
Subjective:       Patient ID:  [id number]     Chief Complaint: Follow-up      Bailey Reyes a 69 y.o. female who presents to the clinic for follow up.      Patient reports that she has been taking Wellbutrin 150 mg daily.  Patient reports that she has not noticed any improvement in her weight.  She denies any notable side effects.  She is interested in adding naltrexone in hopes of better weight loss.      Patient reports that she also pulled back muscle on Tuesday.  She reports that pain is sharp in nature with certain movements.  She denies associated radiculopathy, numbness, tingling, and weakness.  She reports that she has been taking meloxicam daily.  No Other concerns today.    Past Medical History:   Diagnosis Date    Anemia       Active Problem List with Overview Notes    Diagnosis Date Noted    BAO (obstructive sleep apnea) 02/06/2023    Acute cholecystitis 01/16/2023    Paresthesias 05/04/2021    Pre-diabetes 05/04/2021    Iron deficiency 05/04/2021      Review of patient's allergies indicates:  No Known Allergies      Current Outpatient Medications:     buPROPion (WELLBUTRIN XL) 150 MG TB24 tablet, Take 1 tablet (150 mg total) by mouth once daily., Disp: 90 tablet, Rfl: 1    ferrous sulfate, dried (SLOW FE) 160 mg (50 mg iron) TbSR, Take 160 mg by mouth once daily., Disp: , Rfl:     meloxicam (MOBIC) 15 MG tablet, Take 1 tablet (15 mg total) by mouth once daily., Disp: 30 tablet, Rfl: 3    methocarbamoL (ROBAXIN) 500 MG Tab, Take 1 tablet (500 mg total) by mouth 4 (four) times daily. Start with one at night time and titrate up to 4 x daily as needed for 10 days, Disp: 40 tablet, Rfl: 0    naltrexone (DEPADE) 50 mg tablet, Take 12.5 mg (1/4 of 1 tablet) daily for one-week.  Increase to 25 mg (half of 1 tablet) daily for one-week.  Then increase to 50 mg (1 tablet)  daily., Disp: 90 tablet, Rfl: 0    Lab Results   Component Value Date    WBC 3.86 (L) 03/28/2024    HGB 12.2 03/28/2024    HCT 39.2 03/28/2024      03/28/2024    CHOL 192 03/28/2024    TRIG 87 03/28/2024    HDL 43 03/28/2024    ALT 24 03/28/2024    AST 29 03/28/2024     03/28/2024    K 4.0 03/28/2024     03/28/2024    CREATININE 0.8 03/28/2024    BUN 11 03/28/2024    CO2 23 03/28/2024    TSH 2.290 03/28/2024    HGBA1C 5.9 (H) 03/28/2024       Review of Systems   Constitutional:  Negative for fatigue and fever.   HENT: Negative.  Negative for congestion, sneezing and sore throat.    Eyes: Negative.    Respiratory:  Negative for cough, shortness of breath and wheezing.    Cardiovascular:  Negative for chest pain, palpitations and leg swelling.   Gastrointestinal:  Negative for abdominal pain, nausea and vomiting.   Genitourinary: Negative.    Musculoskeletal:  Positive for back pain. Negative for arthralgias.   Skin: Negative.  Negative for rash.   Neurological:  Negative for dizziness, weakness, light-headedness, numbness and headaches.   Hematological: Negative.    Psychiatric/Behavioral: Negative.     All other systems reviewed and are negative.      Objective:      Physical Exam  Constitutional:       Appearance: Normal appearance.   HENT:      Head: Normocephalic and atraumatic.      Nose: Nose normal.   Eyes:      Extraocular Movements: Extraocular movements intact.   Cardiovascular:      Rate and Rhythm: Normal rate and regular rhythm.   Pulmonary:      Effort: Pulmonary effort is normal.      Breath sounds: Normal breath sounds.   Musculoskeletal:         General: Normal range of motion.      Cervical back: Normal range of motion.      Lumbar back: No tenderness.   Skin:     General: Skin is warm and dry.   Neurological:      General: No focal deficit present.      Mental Status: She is alert and oriented to person, place, and time.   Psychiatric:         Mood and Affect: Mood normal.         Assessment:       1. Back pain, unspecified back location, unspecified back pain laterality, unspecified chronicity    2. Obesity (BMI  30.0-34.9)        Plan:       Bailey was seen today for follow-up.    Diagnoses and all orders for this visit:    Back pain, unspecified back location, unspecified back pain laterality, unspecified chronicity  -     methocarbamoL (ROBAXIN) 500 MG Tab; Take 1 tablet (500 mg total) by mouth 4 (four) times daily. Start with one at night time and titrate up to 4 x daily as needed for 10 days  - ice/heat and gentle stretching   - back exercises provided to patient   - Continue meloxicam  - follow-up in clinic if symptoms persist or do not improve     Obesity (BMI 30.0-34.9)  -     naltrexone (DEPADE) 50 mg tablet; Take 12.5 mg (1/4 of 1 tablet) daily for one-week.  Increase to 25 mg (half of 1 tablet) daily for one-week.  Then increase to 50 mg (1 tablet)  daily.  - continue Welbutrin as prescribed    Keep upcoming appointment with Dr. Mejia    Portions of this note were dictated using voice recognition software and may contain dictation related errors in spelling / grammar / syntax not discovered on text review.     Dayami Laughlin PA-C

## 2024-09-10 ENCOUNTER — TELEPHONE (OUTPATIENT)
Dept: OBSTETRICS AND GYNECOLOGY | Facility: CLINIC | Age: 69
End: 2024-09-10
Payer: MEDICARE

## 2024-09-10 NOTE — TELEPHONE ENCOUNTER
Spoke with pt to reschedule appt. New appt set for 10/2/24 @ 2:00 pm. Pt verbalized understanding.

## 2024-09-18 ENCOUNTER — PATIENT MESSAGE (OUTPATIENT)
Dept: FAMILY MEDICINE | Facility: CLINIC | Age: 69
End: 2024-09-18
Payer: MEDICARE

## 2024-09-30 NOTE — PROGRESS NOTES
"[facility]   OFFICE VISIT NOTE    Patient Name: Bailey Joseph  YOB: 1955    PRESENTING HISTORY     History of Present Illness:  Ms. Bailey Joseph is a 69 y.o. female   Here for a 6 month follow up  Meds for weight loss -- naltrexone and wellbutrin -- could not tolerate wellbutrin. She never picked up naltrexone due to the cost of the medication  Last lab work March 2024     Review of Systems   Constitutional:  Negative for chills, fatigue and fever.   Respiratory:  Negative for chest tightness and shortness of breath.    Cardiovascular:  Negative for chest pain, palpitations and leg swelling.   Gastrointestinal:  Negative for constipation, nausea and vomiting.          OBJECTIVE:   Vital Signs:  Vitals:    10/02/24 0915   BP: 122/70   Pulse: 71   Resp: 18   SpO2: 98%   Weight: 95.7 kg (210 lb 15.7 oz)   Height: 5' 8" (1.727 m)           Physical Exam  Constitutional:       General: She is not in acute distress.     Appearance: She is obese. She is not ill-appearing or toxic-appearing.   HENT:      Head: Normocephalic and atraumatic.      Mouth/Throat:      Mouth: Mucous membranes are moist.      Pharynx: Uvula midline. No pharyngeal swelling.   Cardiovascular:      Rate and Rhythm: Normal rate and regular rhythm.   Pulmonary:      Effort: Pulmonary effort is normal. No tachypnea, bradypnea, accessory muscle usage, prolonged expiration or respiratory distress.      Breath sounds: Normal breath sounds. No stridor. No wheezing, rhonchi or rales.   Neurological:      General: No focal deficit present.      Mental Status: She is alert.   Psychiatric:         Mood and Affect: Mood normal.         Behavior: Behavior normal.         ASSESSMENT & PLAN:     Pre-diabetes  -     Comprehensive Metabolic Panel; Future; Expected date: 10/02/2024  -     Hemoglobin A1C; Future; Expected date: 10/02/2024  -     Lipid Panel; Future; Expected date: 10/02/2024  -     Microalbumin/Creatinine Ratio, " Urine; Future; Expected date: 10/02/2024    Obesity (BMI 30.0-34.9)  -     IN OFFICE EKG 12-LEAD (to [place])  Patient interested in medical weight loss options   Denies any contraindications to GLP-1 agonists including hx of pancreatitis or MTC or MEN2 syndrome   Patient also denies any history of cardiac history including CHF or MI     Weight loss counseling, encounter for  -     IN OFFICE EKG 12-LEAD (to [place])  EKG in office wnl   May consider Phentermine as one of the medical weight loss options with close follow up -- I explained to the patient that I can only prescribe Phentermine 3 mo per year  Below weight loss tips reviewed with the patient   Please aim to lose 1-2 lb per week  Work on portion control -- set aside 1/3 to 1/4 of your food in the to-go container before you start eating at the restaurant  Avoid sweet drinks, sweets, and excessive amount of carbohydrates including bread, pasta, noodles, tortilla, and rice  When you eat food, eat vegetables first, protein next, and then carbohydrates   Please keep up with exercises including walking       Iron deficiency  -     CBC Auto Differential; Future; Expected date: 10/02/2024  -     Iron and TIBC; Future; Expected date: 10/02/2024  -     Ferritin; Future; Expected date: 10/02/2024  Compliant with iron supplementation   Will check her iron levels     Encounter for lipid screening for cardiovascular disease  -     Lipid Panel; Future; Expected date: 10/02/2024        Jackie Alcaraz MD  Family Medicine  [facility]     This note was created using [employer] voice recognition software that occasionally misinterprets phrases or words

## 2024-10-02 ENCOUNTER — OFFICE VISIT (OUTPATIENT)
Dept: OBSTETRICS AND GYNECOLOGY | Facility: CLINIC | Age: 69
End: 2024-10-02
Payer: MEDICARE

## 2024-10-02 ENCOUNTER — OFFICE VISIT (OUTPATIENT)
Dept: FAMILY MEDICINE | Facility: CLINIC | Age: 69
End: 2024-10-02
Payer: MEDICARE

## 2024-10-02 VITALS
WEIGHT: 211 LBS | HEIGHT: 68 IN | DIASTOLIC BLOOD PRESSURE: 74 MMHG | SYSTOLIC BLOOD PRESSURE: 144 MMHG | BODY MASS INDEX: 31.98 KG/M2

## 2024-10-02 VITALS
RESPIRATION RATE: 18 BRPM | WEIGHT: 211 LBS | HEART RATE: 71 BPM | DIASTOLIC BLOOD PRESSURE: 70 MMHG | SYSTOLIC BLOOD PRESSURE: 122 MMHG | BODY MASS INDEX: 31.98 KG/M2 | OXYGEN SATURATION: 98 % | HEIGHT: 68 IN

## 2024-10-02 DIAGNOSIS — Z13.220 ENCOUNTER FOR LIPID SCREENING FOR CARDIOVASCULAR DISEASE: ICD-10-CM

## 2024-10-02 DIAGNOSIS — R73.03 PRE-DIABETES: Primary | ICD-10-CM

## 2024-10-02 DIAGNOSIS — Z71.3 WEIGHT LOSS COUNSELING, ENCOUNTER FOR: ICD-10-CM

## 2024-10-02 DIAGNOSIS — Z00.00 ROUTINE GENERAL MEDICAL EXAMINATION AT A HEALTH CARE FACILITY: ICD-10-CM

## 2024-10-02 DIAGNOSIS — N89.8 VAGINAL ODOR: ICD-10-CM

## 2024-10-02 DIAGNOSIS — Z12.4 CERVICAL CANCER SCREENING: ICD-10-CM

## 2024-10-02 DIAGNOSIS — E66.811 OBESITY (BMI 30.0-34.9): ICD-10-CM

## 2024-10-02 DIAGNOSIS — Z13.6 ENCOUNTER FOR LIPID SCREENING FOR CARDIOVASCULAR DISEASE: ICD-10-CM

## 2024-10-02 DIAGNOSIS — E61.1 IRON DEFICIENCY: ICD-10-CM

## 2024-10-02 DIAGNOSIS — Z01.419 WELL WOMAN EXAM: Primary | ICD-10-CM

## 2024-10-02 LAB
OHS QRS DURATION: 80 MS
OHS QTC CALCULATION: 405 MS

## 2024-10-02 PROCEDURE — 1101F PT FALLS ASSESS-DOCD LE1/YR: CPT | Mod: CPTII,S$GLB,, | Performed by: STUDENT IN AN ORGANIZED HEALTH CARE EDUCATION/TRAINING PROGRAM

## 2024-10-02 PROCEDURE — 99999 PR PBB SHADOW E&M-EST. PATIENT-LVL III: CPT | Mod: PBBFAC,,, | Performed by: GENERAL PRACTICE

## 2024-10-02 PROCEDURE — 93010 ELECTROCARDIOGRAM REPORT: CPT | Mod: S$GLB,,, | Performed by: INTERNAL MEDICINE

## 2024-10-02 PROCEDURE — 93005 ELECTROCARDIOGRAM TRACING: CPT | Mod: S$GLB,,, | Performed by: STUDENT IN AN ORGANIZED HEALTH CARE EDUCATION/TRAINING PROGRAM

## 2024-10-02 PROCEDURE — 81514 NFCT DS BV&VAGINITIS DNA ALG: CPT | Performed by: GENERAL PRACTICE

## 2024-10-02 PROCEDURE — 3074F SYST BP LT 130 MM HG: CPT | Mod: CPTII,S$GLB,, | Performed by: STUDENT IN AN ORGANIZED HEALTH CARE EDUCATION/TRAINING PROGRAM

## 2024-10-02 PROCEDURE — 3078F DIAST BP <80 MM HG: CPT | Mod: CPTII,S$GLB,, | Performed by: STUDENT IN AN ORGANIZED HEALTH CARE EDUCATION/TRAINING PROGRAM

## 2024-10-02 PROCEDURE — G2211 COMPLEX E/M VISIT ADD ON: HCPCS | Mod: S$GLB,,, | Performed by: STUDENT IN AN ORGANIZED HEALTH CARE EDUCATION/TRAINING PROGRAM

## 2024-10-02 PROCEDURE — 3288F FALL RISK ASSESSMENT DOCD: CPT | Mod: CPTII,S$GLB,, | Performed by: STUDENT IN AN ORGANIZED HEALTH CARE EDUCATION/TRAINING PROGRAM

## 2024-10-02 PROCEDURE — 3044F HG A1C LEVEL LT 7.0%: CPT | Mod: CPTII,S$GLB,, | Performed by: STUDENT IN AN ORGANIZED HEALTH CARE EDUCATION/TRAINING PROGRAM

## 2024-10-02 PROCEDURE — 99214 OFFICE O/P EST MOD 30 MIN: CPT | Mod: S$GLB,,, | Performed by: STUDENT IN AN ORGANIZED HEALTH CARE EDUCATION/TRAINING PROGRAM

## 2024-10-02 PROCEDURE — 0352U VAGINOSIS SCREEN BY DNA PROBE: CPT | Performed by: GENERAL PRACTICE

## 2024-10-02 PROCEDURE — 99999 PR PBB SHADOW E&M-EST. PATIENT-LVL III: CPT | Mod: PBBFAC,,, | Performed by: STUDENT IN AN ORGANIZED HEALTH CARE EDUCATION/TRAINING PROGRAM

## 2024-10-02 PROCEDURE — 3008F BODY MASS INDEX DOCD: CPT | Mod: CPTII,S$GLB,, | Performed by: STUDENT IN AN ORGANIZED HEALTH CARE EDUCATION/TRAINING PROGRAM

## 2024-10-02 RX ORDER — METRONIDAZOLE 500 MG/1
500 TABLET ORAL EVERY 12 HOURS
Qty: 14 TABLET | Refills: 0 | Status: SHIPPED | OUTPATIENT
Start: 2024-10-02 | End: 2024-10-09

## 2024-10-02 NOTE — PATIENT INSTRUCTIONS
PAP SMEARS:    Screening for cervical cancer involves 1 or 2 parts based on your age and situation:  PAP smear (looking at the cells from your cervix)  HPV testing (checking whether you have the Human Papilloma Virus in your cervical cells)    These test results often come back at different times, but you won't hear from me until they BOTH are back since both pieces of information are important in deciding what to do next.    Soif you see a PAP result in [facility] (or an HPV result) that is abnormal, please allow another 7-10 business days before you send a message asking what to do next.  I am waiting for the other test result before I make a recommendation.    If both tests are resulted in [facility], you should hear from me within 2-3 business days.    Abnormal tests will be followed up in one of two ways:  Repeat testing of PAP and/or HPV  Colposcopy (examination and biopsy of your cervix in the office using staining solutions and magnification)     STD AND VAGINITIS TESTING:  If you are enrolled in [facility], I will trust that when you see a negative result, you understand that means you do not have the particular infection or STD we were checking for.  You will not get a message from me.    If something comes back positive, one of my staff members or I will call you to let you know about the result and what the recommended treatment is.  For most STD's, it is VERY important that you do not have sex until both you and your partner(s) have completed treatment for the STD.  I cannot prescribe medications for your partner(s).    Bacterial Vaginitis (BV) and vaginal yeast infections (Candida, for example) are not STD's.    Trichomonas is an STD.    If you are prescribed an antibiotic, it is very important that you take all of the medicine as prescribed.  Incomplete treatment can cause a relapse and/or antibiotic resistance.

## 2024-10-02 NOTE — PROGRESS NOTES
HISTORY OF THE PRESENT ILLNESS    10/02/2024  Bailey Joseph is a 69 y.o. here for WWE.  Hasn't seen a gynecologist in many years.  Also concerned about a vaginal odor.  Uses boric acid suppositories maybe twice a week.  For about a year.  Sometimes fishy.  No discharge.  No itching.  Started Wellbutrin for weight loss 4-6 weeks ago.  Doing so prompted return of hot flashes.  Has since stopped the Wellbutrin, maybe for about a week.  Prior to being on Wellbutrin didn't have any hot flashes.    G'sP's:   LMP: AGE 52  Relationship: not sexually active and boyfriend x 23 years  PAP Hx: no h/o abnormals  LAST PAP:  PAP NEG / HPV POS (-16/-18)  / Date: 2017  MMG (24) = negative  HRT: never used   BLEEDING: denies     ASSESSMENT AND PLAN:  69 y.o. for WWE.  Suspect BV based on exam.  Hot flashes seem related to having been on Wellbutrin, which she has recently discontinued.    Rx: flagyl 500 bid x 7d  f/u results of vaginitis swab  Cervical Cancer screening: f/u results of PAP / HPV --> if both negative then can discontinue PAP tests based on age and history (still recommend periodic pelvic exam)  Mammogram: up to date  Encouraged self breast awareness; RTC for breast concerns  RTC for periodic GYN exam, sooner prn      GYNECOLOGIC HISTORY  Cervical CA Screen / Infectious   PAP Hx: no h/o abnormals  Genital HSV: no  STD Hx: no past history   Bleeding   Menarche: 17 yoa     Pain   Non-menstrual pelvic pressure/pain: No  Dyspareunia: N/A   Other   Vasomotor Sxs: yes, typically 2 per day and yes, typically 2 per night  Vaginal dryness: denies     OBSTETRIC HISTORY  Living children: 2  Vaginal deliveries: 2    PAST MEDICAL HISTORY  -------------------------------------    Anemia   Asthma  Prediabetes  BMI 32    PAST SURGICAL HISTORY  ----------------------------    Breast biopsy    Cholecystectomy    Colonoscopy    Procedure: COLONOSCOPY;  Surgeon: Angelica Washburn MD;  Location: [facility];  Service:  Endoscopy;  Laterality: N/A;    Gallbladder surgery    Laparoscopic cholecystectomy    Procedure: CHOLECYSTECTOMY, LAPAROSCOPIC;  Surgeon: Jean-Claude Salazar III, MD;  Location: [facility];  Service: General;  Laterality: N/A;    Lipoma resection    Tonsillectomy     ALLERGIES  Review of patient's allergies indicates:  No Known Allergies    MEDICATIONS  Current Outpatient Medications   Medication Instructions    ferrous sulfate, dried (SLOW FE) 160 mg, Daily     SOCIAL HISTORY  Lives with: BF  Smoker: non-smoker  Alcohol: yes, rare  Drugs: denies  Domestic Violence: no  Occupation: homemaker    FAMILY HISTORY  BLEEDING or  CLOTTING DISORDERS: none  BREAST CA:  niece  UTERINE CA: none  OVARIAN CA: none  COLON CA: none    --------------------------------------------------------------------------------------------------------------    PHYSICAL EXAM  Vitals:    10/02/24 1359   BP: (!) 144/74     GEN = alert/oriented, nad, pleasant  HEENT = sclera anicteric, EOM grossly normal  BREASTS = nontender, no masses palpated, no skin changes, no nipple discharge  CV = BP and HR as per vitals  PULM = normal respiratory effort   =      External: nefg, moderately atrophic     Vagina: moderately atrophied and without lesions, urethral meatus normal, and vaginitis swab obtained     Discharge: scant and amine odor noted     Cervix: normal-appearing and PAP smear obtained     Bimanual: uterus normal size and nontender, no adnexal masses or tenderness    LABS & RADS   Lab Results   Component Value Date    WBC 3.86 (L) 03/28/2024    HGB 12.2 03/28/2024    HCT 39.2 03/28/2024     03/28/2024    MCV 95 03/28/2024      Lab Results   Component Value Date    TSH 2.290 03/28/2024           Sabi Arguello MD

## 2024-10-06 DIAGNOSIS — E66.811 OBESITY (BMI 30.0-34.9): Primary | ICD-10-CM

## 2024-10-06 DIAGNOSIS — Z71.3 WEIGHT LOSS COUNSELING, ENCOUNTER FOR: ICD-10-CM

## 2024-10-06 RX ORDER — PHENTERMINE HYDROCHLORIDE 37.5 MG/1
18.75 TABLET ORAL
Qty: 30 TABLET | Refills: 0 | Status: SHIPPED | OUTPATIENT
Start: 2024-10-06

## 2024-10-13 ENCOUNTER — PATIENT MESSAGE (OUTPATIENT)
Dept: OBSTETRICS AND GYNECOLOGY | Facility: CLINIC | Age: 69
End: 2024-10-13
Payer: MEDICARE

## 2024-10-14 NOTE — TELEPHONE ENCOUNTER
Pt LOV 10/2/24. Please advise as pt states she continues to have the same symptoms prior to starting the Flagyl.

## 2024-10-15 ENCOUNTER — TELEPHONE (OUTPATIENT)
Dept: OBSTETRICS AND GYNECOLOGY | Facility: CLINIC | Age: 69
End: 2024-10-15
Payer: MEDICARE

## 2024-10-15 ENCOUNTER — PATIENT MESSAGE (OUTPATIENT)
Dept: OBSTETRICS AND GYNECOLOGY | Facility: CLINIC | Age: 69
End: 2024-10-15
Payer: MEDICARE

## 2024-10-15 NOTE — TELEPHONE ENCOUNTER
----- Message from Sabi Arguello MD sent at 10/15/2024  2:11 PM CDT -----  Please call this patient and schedule her for colposcopy.    Her recent PAP smear and/or HPV testing was abnormal and indicates she might have a precursor to cervical cancer.  A colposcopy involves having a closer look at the cervix with special stains and magnification.  In most cases we will take a biopsy or two of the cervix for a pathologist to examine under a microscope.    I'll be able to explain all of this in more detail and answer her questions at the colpo appointment.  In the meantime I'm sending her some information through [facility].    Thank you,  Dr. Arguello

## 2024-10-15 NOTE — TELEPHONE ENCOUNTER
Spoke with pt to schedule appt for colpo. Pt states she took the antibiotic for the 7 days but she's still experiencing the same symptoms. Please advise.

## 2024-10-30 ENCOUNTER — PATIENT MESSAGE (OUTPATIENT)
Dept: FAMILY MEDICINE | Facility: CLINIC | Age: 69
End: 2024-10-30
Payer: MEDICARE

## 2024-10-30 ENCOUNTER — PATIENT MESSAGE (OUTPATIENT)
Dept: OBSTETRICS AND GYNECOLOGY | Facility: CLINIC | Age: 69
End: 2024-10-30

## 2024-10-30 ENCOUNTER — OFFICE VISIT (OUTPATIENT)
Dept: OBSTETRICS AND GYNECOLOGY | Facility: CLINIC | Age: 69
End: 2024-10-30
Payer: MEDICARE

## 2024-10-30 VITALS
HEIGHT: 68 IN | BODY MASS INDEX: 31.85 KG/M2 | DIASTOLIC BLOOD PRESSURE: 70 MMHG | SYSTOLIC BLOOD PRESSURE: 130 MMHG | WEIGHT: 210.13 LBS

## 2024-10-30 DIAGNOSIS — N95.2 VAGINAL ATROPHY: ICD-10-CM

## 2024-10-30 DIAGNOSIS — N76.0 VULVOVAGINITIS: ICD-10-CM

## 2024-10-30 DIAGNOSIS — R87.619 ABNORMAL CERVICAL PAPANICOLAOU SMEAR, UNSPECIFIED ABNORMAL PAP FINDING: Primary | ICD-10-CM

## 2024-10-30 PROCEDURE — 99999 PR PBB SHADOW E&M-EST. PATIENT-LVL III: CPT | Mod: PBBFAC,,, | Performed by: GENERAL PRACTICE

## 2024-10-30 PROCEDURE — 0352U VAGINOSIS SCREEN BY DNA PROBE: CPT | Performed by: GENERAL PRACTICE

## 2024-10-30 PROCEDURE — 99499 UNLISTED E&M SERVICE: CPT | Mod: S$GLB,,, | Performed by: GENERAL PRACTICE

## 2024-10-30 PROCEDURE — 57454 BX/CURETT OF CERVIX W/SCOPE: CPT | Mod: S$GLB,,, | Performed by: GENERAL PRACTICE

## 2024-10-30 RX ORDER — ESTRADIOL 0.1 MG/G
1 CREAM VAGINAL NIGHTLY
Qty: 15 G | Refills: 0 | Status: SHIPPED | OUTPATIENT
Start: 2024-10-30 | End: 2024-11-13

## 2024-10-30 RX ORDER — ESTRADIOL 0.1 MG/G
1 CREAM VAGINAL
Qty: 90 G | Refills: 3 | Status: SHIPPED | OUTPATIENT
Start: 2024-10-31

## 2024-10-31 ENCOUNTER — PATIENT MESSAGE (OUTPATIENT)
Dept: ADMINISTRATIVE | Facility: HOSPITAL | Age: 69
End: 2024-10-31
Payer: MEDICARE

## 2024-10-31 ENCOUNTER — PATIENT OUTREACH (OUTPATIENT)
Dept: ADMINISTRATIVE | Facility: HOSPITAL | Age: 69
End: 2024-10-31
Payer: MEDICARE

## 2024-10-31 DIAGNOSIS — Z12.31 ENCOUNTER FOR SCREENING MAMMOGRAM FOR BREAST CANCER: Primary | ICD-10-CM

## 2024-11-01 LAB
BACTERIAL VAGINOSIS DNA: NOT DETECTED
CANDIDA GLABRATA/KRUSEI: NOT DETECTED
CANDIDA RRNA VAG QL PROBE: NOT DETECTED
TRICHOMONAS VAGINALIS: NOT DETECTED

## 2024-11-06 ENCOUNTER — TELEPHONE (OUTPATIENT)
Dept: OBSTETRICS AND GYNECOLOGY | Facility: CLINIC | Age: 69
End: 2024-11-06
Payer: MEDICARE

## 2024-11-06 NOTE — TELEPHONE ENCOUNTER
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 Non-urgent, but please schedule her for f/u.  Thanks.  ----- Message -----  From: Guru, [facility]  Sent: 11/1/2024   3:59 PM CST  To: Sabi Arguello MD

## 2024-11-06 NOTE — TELEPHONE ENCOUNTER
Contacted pt to inform her of the results from her Bx shows mild dysplasia. Dr. Arguello would like to have her come into the clinic to discuss her options. Appt set for 12/4/24 @ 11:30 am. Pt verbalized understanding.

## 2024-11-08 ENCOUNTER — TELEPHONE (OUTPATIENT)
Dept: FAMILY MEDICINE | Facility: CLINIC | Age: 69
End: 2024-11-08

## 2024-11-08 ENCOUNTER — OFFICE VISIT (OUTPATIENT)
Dept: FAMILY MEDICINE | Facility: CLINIC | Age: 69
End: 2024-11-08
Payer: MEDICARE

## 2024-11-08 VITALS
WEIGHT: 202.81 LBS | BODY MASS INDEX: 30.74 KG/M2 | HEART RATE: 60 BPM | OXYGEN SATURATION: 98 % | DIASTOLIC BLOOD PRESSURE: 68 MMHG | RESPIRATION RATE: 16 BRPM | SYSTOLIC BLOOD PRESSURE: 120 MMHG | TEMPERATURE: 99 F | HEIGHT: 68 IN

## 2024-11-08 DIAGNOSIS — J06.9 UPPER RESPIRATORY TRACT INFECTION, UNSPECIFIED TYPE: Primary | ICD-10-CM

## 2024-11-08 LAB — GROUP A STREP, MOLECULAR: NEGATIVE

## 2024-11-08 PROCEDURE — 99999 PR PBB SHADOW E&M-EST. PATIENT-LVL IV: CPT | Mod: PBBFAC,,,

## 2024-11-08 PROCEDURE — 87651 STREP A DNA AMP PROBE: CPT | Mod: PO

## 2024-11-08 RX ORDER — FLUTICASONE PROPIONATE 50 MCG
1 SPRAY, SUSPENSION (ML) NASAL DAILY
Qty: 11.1 ML | Refills: 0 | Status: SHIPPED | OUTPATIENT
Start: 2024-11-08

## 2024-11-08 RX ORDER — DEXAMETHASONE SODIUM PHOSPHATE 4 MG/ML
8 INJECTION, SOLUTION INTRA-ARTICULAR; INTRALESIONAL; INTRAMUSCULAR; INTRAVENOUS; SOFT TISSUE ONCE
Status: COMPLETED | OUTPATIENT
Start: 2024-11-08 | End: 2024-11-08

## 2024-11-08 RX ORDER — GUAIFENESIN 600 MG/1
1200 TABLET, EXTENDED RELEASE ORAL 2 TIMES DAILY
Qty: 28 TABLET | Refills: 0 | Status: SHIPPED | OUTPATIENT
Start: 2024-11-08 | End: 2024-11-15

## 2024-11-08 RX ORDER — CETIRIZINE HYDROCHLORIDE 10 MG/1
10 TABLET ORAL DAILY
Qty: 30 TABLET | Refills: 2 | Status: SHIPPED | OUTPATIENT
Start: 2024-11-08 | End: 2025-02-06

## 2024-11-08 RX ADMIN — DEXAMETHASONE SODIUM PHOSPHATE 8 MG: 4 INJECTION, SOLUTION INTRA-ARTICULAR; INTRALESIONAL; INTRAMUSCULAR; INTRAVENOUS; SOFT TISSUE at 03:11

## 2024-11-08 NOTE — PROGRESS NOTES
Subjective:       Patient ID:  [id number]     Chief Complaint: Sore Throat      History of Present Illness        Patient reports cold-like symptoms that have been present for 3 days. Symptoms include fever ,  body aches, headache, sinus congestion , facial pressure, ear pain, sore throat, cough, Diarrhea, and SOB. Patient has tried Mucinex max for sinus congestion with minimal improvement in symptoms.   Answers submitted by the patient for this visit:  Sore Throat Questionnaire (Submitted on 11/8/2024)  Chief Complaint: Sore throat  Chronicity: new  Onset: yesterday  Progression since onset: rapidly worsening  Pain worse on: right  Fever: 102 - 102.9 F  Fever duration: 1 to 2 days  Pain - numeric: 9/10  hoarse voice: Yes  plugged ear sensation: Yes  swollen glands: Yes  Treatments tried: gargles, oral narcotic analgesics  Improvement on treatment: mild  Pain severity: moderate      Past Medical History:   Diagnosis Date    Anemia       Active Problem List with Overview Notes    Diagnosis Date Noted    BAO (obstructive sleep apnea) 02/06/2023    Acute cholecystitis 01/16/2023    Paresthesias 05/04/2021    Pre-diabetes 05/04/2021    Iron deficiency 05/04/2021      Review of patient's allergies indicates:  No Known Allergies      Current Outpatient Medications:     estradioL (ESTRACE) 0.01 % (0.1 mg/gram) vaginal cream, Place 1 g vaginally twice a week., Disp: 90 g, Rfl: 3    estradioL (ESTRACE) 0.01 % (0.1 mg/gram) vaginal cream, Place 1 g vaginally every evening. for 14 days, Disp: 15 g, Rfl: 0    ferrous sulfate, dried (SLOW FE) 160 mg (50 mg iron) TbSR, Take 160 mg by mouth once daily., Disp: , Rfl:     cetirizine (ZYRTEC) 10 MG tablet, Take 1 tablet (10 mg total) by mouth once daily., Disp: 30 tablet, Rfl: 2    fluticasone propionate (FLONASE) 50 mcg/actuation nasal spray, 1 spray (50 mcg total) by Each Nostril route once daily., Disp: 11.1 mL, Rfl: 0    guaiFENesin (MUCINEX) 600 mg 12 hr tablet, Take 2 tablets  (1,200 mg total) by mouth 2 (two) times daily. for 7 days, Disp: 28 tablet, Rfl: 0    Current Facility-Administered Medications:     dexAMETHasone injection 8 mg, 8 mg, Intramuscular, Once, Dayami Laughlin PA-C    Lab Results   Component Value Date    WBC 4.31 10/02/2024    HGB 11.9 (L) 10/02/2024    HCT 38.7 10/02/2024     10/02/2024    CHOL 202 (H) 10/02/2024    TRIG 137 10/02/2024    HDL 39 (L) 10/02/2024    ALT 35 10/02/2024    AST 28 10/02/2024     10/02/2024    K 3.9 10/02/2024     10/02/2024    CREATININE 0.9 10/02/2024    BUN 10 10/02/2024    CO2 27 10/02/2024    TSH 2.290 03/28/2024    HGBA1C 5.9 (H) 10/02/2024       Review of Systems   Constitutional:  Negative for fatigue and fever.   HENT:  Positive for congestion, ear pain, sore throat and trouble swallowing. Negative for sneezing.    Eyes: Negative.    Respiratory:  Positive for cough and shortness of breath. Negative for wheezing.    Cardiovascular:  Negative for chest pain, palpitations and leg swelling.   Gastrointestinal:  Negative for abdominal pain, nausea and vomiting.   Genitourinary: Negative.    Musculoskeletal: Negative.  Negative for arthralgias.   Skin: Negative.  Negative for rash.   Neurological:  Positive for headaches. Negative for dizziness, weakness, light-headedness and numbness.   Hematological: Negative.    Psychiatric/Behavioral: Negative.         Objective:      Physical Exam  Constitutional:       Appearance: Normal appearance.   HENT:      Head: Normocephalic and atraumatic.      Right Ear: Tympanic membrane is bulging.      Left Ear: A middle ear effusion is present.      Nose: Rhinorrhea present.      Mouth/Throat:      Pharynx: Posterior oropharyngeal erythema present. No oropharyngeal exudate.   Eyes:      Extraocular Movements: Extraocular movements intact.   Cardiovascular:      Rate and Rhythm: Normal rate and regular rhythm.   Pulmonary:      Effort: Pulmonary effort is normal.      Breath sounds:  Normal breath sounds.   Musculoskeletal:         General: Normal range of motion.      Cervical back: Normal range of motion.   Skin:     General: Skin is warm and dry.   Neurological:      General: No focal deficit present.      Mental Status: She is alert and oriented to person, place, and time.   Psychiatric:         Mood and Affect: Mood normal.         Assessment:       1. Upper respiratory tract infection, unspecified type        Plan:       Bailey was seen today for sore throat.    Diagnoses and all orders for this visit:    Upper respiratory tract infection, unspecified type  - Strep negative  -     POCT COVID-19 Rapid Screening  -     guaiFENesin (MUCINEX) 600 mg 12 hr tablet; Take 2 tablets (1,200 mg total) by mouth 2 (two) times daily. for 7 days  -     fluticasone propionate (FLONASE) 50 mcg/actuation nasal spray; 1 spray (50 mcg total) by Each Nostril route once daily.  -     cetirizine (ZYRTEC) 10 MG tablet; Take 1 tablet (10 mg total) by mouth once daily.  -     dexAMETHasone injection 8 mg  - Discussed with patient their symptoms are likely related to a viral infection. At this time, I do not recommend antibiotic use.  - Warm showers recommended   - rest and increased fluids recommended   - ibuprofen/tylenol PRN for fevers  - advised close follow-up if symptoms worsen or do not improve                 Future Appointments       Date Provider Specialty Appt Notes    12/4/2024 Sabi Arguello MD Obstetrics and Gynecology Follow up    2/20/2025 Jackie Alcaraz MD Family Medicine 4 month f/u weight loss               Portions of this note were dictated using voice recognition software and may contain dictation related errors in spelling / grammar / syntax not discovered on text review.     Dayami Laughlin PA-C

## 2024-11-08 NOTE — TELEPHONE ENCOUNTER
----- Message from Dayami Laughlin PA-C sent at 11/8/2024  3:55 PM CST -----  Please notify patient that her strep test was negative

## 2024-11-27 ENCOUNTER — TELEPHONE (OUTPATIENT)
Dept: FAMILY MEDICINE | Facility: CLINIC | Age: 69
End: 2024-11-27

## 2024-11-27 ENCOUNTER — OFFICE VISIT (OUTPATIENT)
Dept: FAMILY MEDICINE | Facility: CLINIC | Age: 69
End: 2024-11-27
Payer: MEDICARE

## 2024-11-27 VITALS
HEART RATE: 77 BPM | BODY MASS INDEX: 33.05 KG/M2 | TEMPERATURE: 98 F | RESPIRATION RATE: 16 BRPM | HEIGHT: 68 IN | OXYGEN SATURATION: 98 % | WEIGHT: 218.06 LBS | DIASTOLIC BLOOD PRESSURE: 70 MMHG | SYSTOLIC BLOOD PRESSURE: 122 MMHG

## 2024-11-27 DIAGNOSIS — H66.93 BILATERAL OTITIS MEDIA, UNSPECIFIED OTITIS MEDIA TYPE: ICD-10-CM

## 2024-11-27 DIAGNOSIS — K14.0 TRANSIENT LINGUAL PAPILLITIS: Primary | ICD-10-CM

## 2024-11-27 PROCEDURE — 3078F DIAST BP <80 MM HG: CPT | Mod: CPTII,S$GLB,,

## 2024-11-27 PROCEDURE — 3044F HG A1C LEVEL LT 7.0%: CPT | Mod: CPTII,S$GLB,,

## 2024-11-27 PROCEDURE — 1159F MED LIST DOCD IN RCRD: CPT | Mod: CPTII,S$GLB,,

## 2024-11-27 PROCEDURE — 3008F BODY MASS INDEX DOCD: CPT | Mod: CPTII,S$GLB,,

## 2024-11-27 PROCEDURE — 3074F SYST BP LT 130 MM HG: CPT | Mod: CPTII,S$GLB,,

## 2024-11-27 PROCEDURE — 99213 OFFICE O/P EST LOW 20 MIN: CPT | Mod: S$GLB,,,

## 2024-11-27 PROCEDURE — 99999 PR PBB SHADOW E&M-EST. PATIENT-LVL IV: CPT | Mod: PBBFAC,,,

## 2024-11-27 PROCEDURE — 1160F RVW MEDS BY RX/DR IN RCRD: CPT | Mod: CPTII,S$GLB,,

## 2024-11-27 PROCEDURE — 1126F AMNT PAIN NOTED NONE PRSNT: CPT | Mod: CPTII,S$GLB,,

## 2024-11-27 RX ORDER — AMOXICILLIN AND CLAVULANATE POTASSIUM 875; 125 MG/1; MG/1
1 TABLET, FILM COATED ORAL 2 TIMES DAILY
Qty: 14 TABLET | Refills: 0 | Status: SHIPPED | OUTPATIENT
Start: 2024-11-27 | End: 2024-12-04

## 2024-11-27 RX ORDER — LIDOCAINE HYDROCHLORIDE 20 MG/ML
SOLUTION OROPHARYNGEAL
Qty: 100 ML | Refills: 0 | Status: SHIPPED | OUTPATIENT
Start: 2024-11-27

## 2024-11-27 RX ORDER — LIDOCAINE HYDROCHLORIDE 20 MG/ML
SOLUTION OROPHARYNGEAL EVERY 6 HOURS PRN
Qty: 10 ML | Refills: 0 | Status: SHIPPED | OUTPATIENT
Start: 2024-11-27 | End: 2024-11-27 | Stop reason: SDUPTHER

## 2024-11-27 NOTE — ADDENDUM NOTE
----- Message from Leif Chauhan MD sent at 2021  6:42 PM EDT -----  Regarding: FW: Update Medical Information  Contact: [phone]  Did she get a Covid test?  ----- Message -----  From: Shania Mcclendon  Sent: 2021   5:46 PM EDT  To: Leif Chauhan MD  Subject: FW: Update Medical Information                     ----- Message -----  From: Junior Man  Sent: 2021   3:06 PM EDT  To: [facility]  Subject: Update Medical Information                       Visit to Patient Jeronimo Rinaldi, [facility], 2021 - Diagnosis:  Minor Pneumonia - See attachment. Thanks. Rylie Rodriguez  [phone]  :  1946 Addended by: EMMA RUIZ on: 11/27/2024 03:12 PM     Modules accepted: Orders

## 2024-11-27 NOTE — PROGRESS NOTES
Subjective:       Patient ID:  [id number]     Chief Complaint: Sore Throat      History of Present Illness      Ms. Joseph is a 69-year-old female who presents to the clinic for sore throat.      Patient was previously seen on 11/08/2024 for upper respiratory infection symptoms.  She reports that her sore throat has persisted since then as well as ear discomfort.  She reports that Monday she also was experiencing tongue pain with small red white colored papules on her tongue.  She reports that these symptoms have improved somewhat.  However, her tongue is .  She denies any associated fever, chills, and body aches.  No other concerns today.    Past Medical History:   Diagnosis Date    Anemia       Active Problem List with Overview Notes    Diagnosis Date Noted    BAO (obstructive sleep apnea) 02/06/2023    Acute cholecystitis 01/16/2023    Paresthesias 05/04/2021    Pre-diabetes 05/04/2021    Iron deficiency 05/04/2021      Review of patient's allergies indicates:  No Known Allergies      Current Outpatient Medications:     cetirizine (ZYRTEC) 10 MG tablet, Take 1 tablet (10 mg total) by mouth once daily., Disp: 30 tablet, Rfl: 2    estradioL (ESTRACE) 0.01 % (0.1 mg/gram) vaginal cream, Place 1 g vaginally twice a week., Disp: 90 g, Rfl: 3    ferrous sulfate, dried (SLOW FE) 160 mg (50 mg iron) TbSR, Take 160 mg by mouth once daily., Disp: , Rfl:     fluticasone propionate (FLONASE) 50 mcg/actuation nasal spray, 1 spray (50 mcg total) by Each Nostril route once daily., Disp: 11.1 mL, Rfl: 0    amoxicillin-clavulanate 875-125mg (AUGMENTIN) 875-125 mg per tablet, Take 1 tablet by mouth 2 (two) times daily. for 7 days, Disp: 14 tablet, Rfl: 0    LIDOcaine viscous HCl 2% (LIDOCAINE VISCOUS) 2 % Soln, by Mucous Membrane route every 6 (six) hours as needed (mouth pain)., Disp: 10 mL, Rfl: 0    Lab Results   Component Value Date    WBC 4.31 10/02/2024    HGB 11.9 (L) 10/02/2024    HCT 38.7 10/02/2024      10/02/2024    CHOL 202 (H) 10/02/2024    TRIG 137 10/02/2024    HDL 39 (L) 10/02/2024    ALT 35 10/02/2024    AST 28 10/02/2024     10/02/2024    K 3.9 10/02/2024     10/02/2024    CREATININE 0.9 10/02/2024    BUN 10 10/02/2024    CO2 27 10/02/2024    TSH 2.290 03/28/2024    HGBA1C 5.9 (H) 10/02/2024       Review of Systems   Constitutional:  Negative for fatigue and fever.   HENT:  Positive for congestion, ear pain and sore throat. Negative for sneezing.    Eyes: Negative.    Respiratory:  Negative for cough, shortness of breath and wheezing.    Cardiovascular:  Negative for chest pain, palpitations and leg swelling.   Gastrointestinal:  Negative for abdominal pain, nausea and vomiting.   Genitourinary: Negative.    Musculoskeletal: Negative.  Negative for arthralgias.   Skin: Negative.  Negative for rash.   Neurological:  Negative for dizziness, weakness, light-headedness, numbness and headaches.   Hematological: Negative.    Psychiatric/Behavioral: Negative.     All other systems reviewed and are negative.      Objective:      Physical Exam  Constitutional:       Appearance: Normal appearance.   HENT:      Head: Normocephalic and atraumatic.      Ears:      Comments: Bilateral cloudy TM     Nose: Nose normal.      Mouth/Throat:      Tongue: Lesions (Small red papules noted at tip of tongue) present.      Pharynx: Oropharynx is clear. No pharyngeal swelling.      Tonsils: No tonsillar exudate.   Eyes:      Extraocular Movements: Extraocular movements intact.   Cardiovascular:      Rate and Rhythm: Normal rate and regular rhythm.   Pulmonary:      Effort: Pulmonary effort is normal.      Breath sounds: Normal breath sounds.   Musculoskeletal:         General: Normal range of motion.      Cervical back: Normal range of motion.   Skin:     General: Skin is warm and dry.   Neurological:      General: No focal deficit present.      Mental Status: She is alert and oriented to person, place, and time.    Psychiatric:         Mood and Affect: Mood normal.         Assessment:       1. Transient lingual papillitis    2. Bilateral otitis media, unspecified otitis media type        Plan:       Bailey was seen today for sore throat.    Diagnoses and all orders for this visit:    Transient lingual papillitis  -     LIDOcaine viscous HCl 2% (LIDOCAINE VISCOUS) 2 % Soln; by Mucous Membrane route every 6 (six) hours as needed (mouth pain).  - Salt water mouth washes    Bilateral otitis media, unspecified otitis media type  -     amoxicillin-clavulanate 875-125mg (AUGMENTIN) 875-125 mg per tablet; Take 1 tablet by mouth 2 (two) times daily. for 7 days  - Warm showers recommended   - rest and increased fluids recommended   - ibuprofen/tylenol PRN for fevers  - advised close follow-up if symptoms worsen or do not improve             Future Appointments       Date Provider Specialty Appt Notes    12/4/2024 Sabi Arguello MD Obstetrics and Gynecology Follow up    2/20/2025 Jackie Alcaraz MD Family Medicine 4 month f/u weight loss               Portions of this note were dictated using voice recognition software and may contain dictation related errors in spelling / grammar / syntax not discovered on text review.     Dayami Laughlin PA-C

## 2024-11-27 NOTE — TELEPHONE ENCOUNTER
----- Message from Miya sent at 11/27/2024  1:20 PM CST -----  Regarding: Needs clarification  Type: Needs Medical Advice  Who Called:  [facility]    Pharmacy name and phone #:    [facility] [id number]  [place] - [address]  [address]  [facility] [address]  Phone: [phone] Fax: [phone]      Best Call Back Number: [phone]  Additional Information: Needs clarification on licocaine liskess solution, needs to clarfiy quantity and how much is going to be used ever 6 hours

## 2024-12-04 ENCOUNTER — OFFICE VISIT (OUTPATIENT)
Dept: OBSTETRICS AND GYNECOLOGY | Facility: CLINIC | Age: 69
End: 2024-12-04
Payer: MEDICARE

## 2024-12-04 VITALS
SYSTOLIC BLOOD PRESSURE: 116 MMHG | BODY MASS INDEX: 30.79 KG/M2 | WEIGHT: 203.13 LBS | HEIGHT: 68 IN | DIASTOLIC BLOOD PRESSURE: 74 MMHG

## 2024-12-04 DIAGNOSIS — N87.0 DYSPLASIA OF CERVIX, LOW GRADE (CIN 1): Primary | ICD-10-CM

## 2024-12-04 DIAGNOSIS — N95.2 VAGINAL ATROPHY: ICD-10-CM

## 2024-12-04 PROCEDURE — 99999 PR PBB SHADOW E&M-EST. PATIENT-LVL III: CPT | Mod: PBBFAC,,, | Performed by: GENERAL PRACTICE

## 2024-12-04 RX ORDER — ESTRADIOL 0.1 MG/G
1 CREAM VAGINAL
Qty: 90 G | Refills: 3 | Status: SHIPPED | OUTPATIENT
Start: 2024-12-05

## 2024-12-04 NOTE — PROGRESS NOTES
Background  10/02/2024  Bailey Joseph is a 69 y.o. here for WWE.  Hasn't seen a gynecologist in many years.  Also concerned about a vaginal odor.  Uses boric acid suppositories maybe twice a week.  For about a year.  Sometimes fishy.  No discharge.  No itching.  Started Wellbutrin for weight loss 4-6 weeks ago.  Doing so prompted return of hot flashes.  Has since stopped the Wellbutrin, maybe for about a week.  Prior to being on Wellbutrin didn't have any hot flashes. Exam c/w BV but no vaginitis swab sent.  -->Rx flagyl    10/30/2024   Bailey Joseph is a 69 y.o. here for colposcopy.  Hot flashes are better off the Wellbutrin.  Completed the course of flagyl.  Hasn't been using the boric acid.       SUBJECTIVE   2024  Bailey Joseph is a 69 y.o. here for discussion about recent cervical biopsy results.    G'sP's:   LMP: age 52  Relationship: not sexually active and boyfriend x 23 years  PAP Hx: see Dysplasia Hx below  LAST PAP: 10/14/2024: ASC-H / HPV POS (-16/-18)  MMG (24) = negative  HRT: never used   BLEEDING: denies     DYSPLASIA HISTORY:  2017:  PAP NEG / HPV POS (-16/-18) <-- first-ever abnormal  10/14/2024: ASC-H / HPV POS (-16/-18)  10/30/2024: JANN 1 @ 12, JANN 1 @ [facility]    ASSESSMENT / PLAN   69 y.o. with JANN 1 per recent colposcopy, preceded by ASC-H in setting of at least moderate vulvovaginal atrophy.  Discussed options of excisional biopsy (LEEP) now versus watchful waiting with PAP/HPV + colpo in 6 months after regular use of vaginal estrogen in the meantime.  She opts for watchful waiting.    Rx: vaginal estrogen  Mammogram: scheduled soon  Return to clinic:  late 2025 for PAP/HPV + colpo, sooner prn    OBJECTIVE   Vitals:    24 1125   BP: 116/74       Sabi Arguello MD    -----------------------  10/2/24:  GYNECOLOGIC HISTORY  Cervical CA Screen / Infectious   PAP Hx: no h/o abnormals  Genital HSV: no  STD Hx: no past history   Bleeding   Menarche: 17 yoa      Pain    Non-menstrual pelvic pressure/pain: No  Dyspareunia: N/A   Other   Vasomotor Sxs: yes, typically 2 per day and yes, typically 2 per night  Vaginal dryness: denies      OBSTETRIC HISTORY  Living children: 2  Vaginal deliveries: 2     PAST MEDICAL HISTORY        -------------------------------------    Anemia   Asthma  Prediabetes  BMI 32     PAST SURGICAL HISTORY  Past Surgical History:   Procedure Laterality Date    BREAST BIOPSY Left     COLONOSCOPY N/A 05/10/2023    Procedure: COLONOSCOPY;  Surgeon: Angelica Washburn MD;  Location: [facility];  Service: Endoscopy;  Laterality: N/A;    LAPAROSCOPIC CHOLECYSTECTOMY N/A 01/16/2023    Procedure: CHOLECYSTECTOMY, LAPAROSCOPIC;  Surgeon: Jean-Claude Salazar III, MD;  Location: [facility] OR;  Service: General;  Laterality: N/A;    LIPOMA RESECTION      TONSILLECTOMY        ALLERGIES  Review of patient's allergies indicates:  No Known Allergies     MEDICATIONS          Current Outpatient Medications   Medication Instructions    ferrous sulfate, dried (SLOW FE) 160 mg, Daily      SOCIAL HISTORY  Lives with: BF  Smoker: non-smoker  Alcohol: yes, rare  Drugs: denies  Domestic Violence: no  Occupation: homemaker     FAMILY HISTORY  BLEEDING or  CLOTTING DISORDERS: none  BREAST CA:  niece  UTERINE CA: none  OVARIAN CA: none  COLON CA: none

## 2025-03-21 ENCOUNTER — OFFICE VISIT (OUTPATIENT)
Dept: FAMILY MEDICINE | Facility: CLINIC | Age: 70
End: 2025-03-21
Payer: MEDICARE

## 2025-03-21 ENCOUNTER — HOSPITAL ENCOUNTER (OUTPATIENT)
Dept: RADIOLOGY | Facility: HOSPITAL | Age: 70
Discharge: HOME | End: 2025-03-21
Attending: STUDENT IN AN ORGANIZED HEALTH CARE EDUCATION/TRAINING PROGRAM
Payer: MEDICARE

## 2025-03-21 ENCOUNTER — TELEPHONE (OUTPATIENT)
Dept: FAMILY MEDICINE | Facility: CLINIC | Age: 70
End: 2025-03-21

## 2025-03-21 ENCOUNTER — RESULTS FOLLOW-UP (OUTPATIENT)
Dept: FAMILY MEDICINE | Facility: CLINIC | Age: 70
End: 2025-03-21

## 2025-03-21 VITALS
OXYGEN SATURATION: 98 % | HEIGHT: 68 IN | BODY MASS INDEX: 31.04 KG/M2 | HEART RATE: 82 BPM | DIASTOLIC BLOOD PRESSURE: 64 MMHG | WEIGHT: 204.81 LBS | SYSTOLIC BLOOD PRESSURE: 114 MMHG

## 2025-03-21 DIAGNOSIS — R91.1 SOLITARY PULMONARY NODULE: ICD-10-CM

## 2025-03-21 DIAGNOSIS — R06.00 DYSPNEA, UNSPECIFIED TYPE: ICD-10-CM

## 2025-03-21 DIAGNOSIS — R91.1 PULMONARY NODULE: Primary | ICD-10-CM

## 2025-03-21 DIAGNOSIS — Z00.00 ROUTINE GENERAL MEDICAL EXAMINATION AT A HEALTH CARE FACILITY: ICD-10-CM

## 2025-03-21 DIAGNOSIS — R79.89 POSITIVE D DIMER: ICD-10-CM

## 2025-03-21 DIAGNOSIS — R07.9 CHEST PAIN, UNSPECIFIED TYPE: ICD-10-CM

## 2025-03-21 DIAGNOSIS — R06.00 DYSPNEA, UNSPECIFIED TYPE: Primary | ICD-10-CM

## 2025-03-21 DIAGNOSIS — R73.03 PRE-DIABETES: ICD-10-CM

## 2025-03-21 DIAGNOSIS — R79.89 POSITIVE D DIMER: Primary | ICD-10-CM

## 2025-03-21 DIAGNOSIS — E61.1 IRON DEFICIENCY: ICD-10-CM

## 2025-03-21 LAB
OHS QRS DURATION: 82 MS
OHS QTC CALCULATION: 391 MS

## 2025-03-21 PROCEDURE — 99999 PR PBB SHADOW E&M-EST. PATIENT-LVL III: CPT | Mod: PBBFAC,,, | Performed by: STUDENT IN AN ORGANIZED HEALTH CARE EDUCATION/TRAINING PROGRAM

## 2025-03-21 PROCEDURE — 25500020 PHARM REV CODE 255

## 2025-03-21 PROCEDURE — 71275 CT ANGIOGRAPHY CHEST: CPT | Mod: 26,,, | Performed by: RADIOLOGY

## 2025-03-21 PROCEDURE — 93005 ELECTROCARDIOGRAM TRACING: CPT | Mod: S$GLB,,, | Performed by: STUDENT IN AN ORGANIZED HEALTH CARE EDUCATION/TRAINING PROGRAM

## 2025-03-21 PROCEDURE — 71046 X-RAY EXAM CHEST 2 VIEWS: CPT | Mod: TC

## 2025-03-21 PROCEDURE — 71275 CT ANGIOGRAPHY CHEST: CPT | Mod: TC

## 2025-03-21 PROCEDURE — 71046 X-RAY EXAM CHEST 2 VIEWS: CPT | Mod: 26,,, | Performed by: RADIOLOGY

## 2025-03-21 PROCEDURE — 93010 ELECTROCARDIOGRAM REPORT: CPT | Mod: S$GLB,,, | Performed by: INTERNAL MEDICINE

## 2025-03-21 RX ADMIN — IOHEXOL 75 ML: 350 INJECTION, SOLUTION INTRAVENOUS at 02:03

## 2025-03-21 NOTE — PROGRESS NOTES
"[facility]   OFFICE VISIT NOTE    Patient Name: Bailey Joseph  YOB: 1955    PRESENTING HISTORY     History of Present Illness:  Ms. Bailey Joseph is a 69 y.o. female     Last follow up Oct 2024     Meds for weight loss -- naltrexone and wellbutrin -- could not tolerate wellbutrin. She never picked up naltrexone due to the cost of the medication  Last lab work March 2024     History of Present Illness    CHIEF COMPLAINT:  Patient presents today with shortness of breath.    HISTORY OF PRESENT ILLNESS:  She reports experiencing shortness of breath for about two months, occurring with exertion, particularly when climbing stairs. She becomes winded after climbing 15 steps and needs to take a break. She acknowledges that her lack of regular exercise may contribute to this symptom. She previously tried Phentermine but discontinued use in October due to breathing issues and difficulty taking full breaths, similar to her childhood asthma symptoms. These side effects occurred even with half a pill.    MEDICAL CONDITIONS:  She has a history of childhood asthma and longstanding iron deficiency anemia since a young age, with no specific etiology identified.    MEDICATIONS:  She is currently taking iron supplements.    SOCIAL HISTORY:  She denies ever smoking but reports significant secondhand smoke exposure for 25 years while living with a spouse who smoked in the house, car, and other locations.    Review of Systems   Constitutional:  Negative for chills and fever.   Respiratory:  Positive for shortness of breath.    Cardiovascular:  Negative for chest pain.   Gastrointestinal:  Negative for abdominal pain, blood in stool, nausea and vomiting.   Genitourinary:  Negative for hematuria.          OBJECTIVE:   Vital Signs:  Vitals:    03/21/25 1106   BP: 114/64   Pulse: 82   SpO2: 98%   Weight: 92.9 kg (204 lb 12.9 oz)   Height: 5' 8" (1.727 m)           Physical Exam  Constitutional:       " General: She is not in acute distress.     Appearance: She is not ill-appearing or toxic-appearing.   HENT:      Head: Normocephalic and atraumatic.      Mouth/Throat:      Mouth: Mucous membranes are moist.      Pharynx: Uvula midline. No pharyngeal swelling.   Cardiovascular:      Rate and Rhythm: Normal rate and regular rhythm.   Pulmonary:      Effort: Pulmonary effort is normal. No tachypnea, bradypnea, accessory muscle usage, prolonged expiration or respiratory distress.      Breath sounds: Normal breath sounds. No stridor. No wheezing, rhonchi or rales.   Abdominal:      General: Bowel sounds are normal. There is no distension.      Palpations: Abdomen is soft.      Tenderness: There is no abdominal tenderness. There is no guarding or rebound.   Neurological:      General: No focal deficit present.      Mental Status: She is alert.   Psychiatric:         Mood and Affect: Mood normal.         Behavior: Behavior normal.         ASSESSMENT & PLAN:     Dyspnea, unspecified type  -     BNP; Future; Expected date: 03/21/2025  -     IN OFFICE EKG 12-LEAD (to Muse)  -     X-Ray Chest PA And Lateral; Future; Expected date: 03/21/2025  -     Complete PFT w/ bronchodilator; Future  -     Troponin I; Future; Expected date: 03/21/2025  -     D-Dimer, Quantitative; Future; Expected date: 03/21/2025  - Evaluated the patient's shortness of breath, considering multiple differentials including deconditioning, undiagnosed COPD or asthma, MI, PE, worsening anemia, heart failure, and thyroid dysfunction.  - Performed physical exam including lung auscultation, which revealed normal breath sounds.  - Conducted EKG, which was within normal limits.  - Discussed the need for further testing to diagnose lung conditions, including spirometry.  - Initiated initial workup for dyspnea before determining next steps or potential specialist referral.  - Instructed the patient to seek immediate medical attention if shortness of breath worsens  or is accompanied by chest pain.  - Oxygen saturation wnl in office   - Patient had elevated D-dimer and I ordered stat CTA chest to r/o PE -- awaiting the result       Hemoglobin/hematocrit Anemia can present as dyspnea or reduced exercise tolerance.   Glucose, blood urea nitrogen, creatinine, electrolytes, phosphate, calcium For adults with other comorbidities or over age 40, screen for metabolic causes of dyspnea.   Thyroid stimulating hormone (TSH) Hyper and hypothyroidism can present as dyspnea or reduced exercise tolerance.   Spirometry pre/post bronchodilator with or without lung volumes and DLCO Depending on the likelihood of asthma or COPD and difficulties of travel and scheduling, spirometry pre/post bronchodilator may be ordered initially without full PFTs. Alternatively, full testing (spirometry pre and post bronchodilator, lung volumes, DLCO, ambulatory oximetry) may be more expeditious.  Refer to UpToDate table on follow-up testing based on initial results.   Assess SpO2 (eg, walking >=200 feet and two flights of stairs)* Hypoxemia at rest or desaturation with exertion indicates the need to pursue definitive diagnosis. Obtain full PFTs, CXR, BNP, ECG, and possibly echocardiogram.  Refer to UpToDate table on follow-up testing based on initial results.   Chest radiograph Indicated for most dyspneic patients, particularly those who are over age 40, have suspected heart failure or interstitial disease, or abnormal PFTs. Not needed in routine evaluation of asthma.   ECG Indicated for most dyspneic patients over age 40. Not needed in young patients with clear diagnosis of asthma and response to treatment.  Refer to UpToDate table on follow-up testing based on initial results.   Plasma BNP or NT-pro BNP Useful screening test for HF although not entirely specific; dyspnea due to HF is associated with plasma BNP >400 pg/mL; high negative predictive value for BNP <100 pg/mL, although BNP increases with age.        Pre-diabetes  -     Comprehensive Metabolic Panel; Future; Expected date: 03/21/2025  -     Hemoglobin A1C; Future; Expected date: 03/21/2025  -     Lipid Panel; Future; Expected date: 03/21/2025  -     Microalbumin/Creatinine Ratio, Urine; Future; Expected date: 03/21/2025  - Has not been working on dietary changes  - Will repeat her A1c and come up with plans together     Iron deficiency  -     CBC Auto Differential; Future; Expected date: 03/21/2025  -     Iron and TIBC; Future; Expected date: 03/21/2025  -     Ferritin; Future; Expected date: 03/21/2025  - Noted the patient's long-standing history of low iron levels.  - Considered worsening anemia as a possible cause for patient's symptoms with dyspnea   - Continued iron supplements.    Routine general medical examination at a health care facility  -     TSH; Future; Expected date: 03/21/2025  -     T4, Free; Future; Expected date: 03/21/2025    WEIGHT MANAGEMENT:  - Considered stable weight and previous intolerance to weight loss medications.  - Discontinued Phentermine and other weight loss medications due to side effects and ineffectiveness.    FOLLOW-UP:  - Follow up with physician assistant in 6 months.    Jackie Alcaraz MD  Family Medicine  [facility]     This note was created using MModal voice recognition software that occasionally misinterprets phrases or words

## 2025-03-21 NOTE — TELEPHONE ENCOUNTER
Augustine from [facility] called with D.-Dimer 2.01. Dr. Alcaraz notified states she will call patient

## 2025-03-21 NOTE — PROGRESS NOTES
Called the patient and informed her of abnormal lab result with D-dimer at 2  CTA chest ordered for PE evaluation  Patient will go and get it done now.

## 2025-03-23 ENCOUNTER — RESULTS FOLLOW-UP (OUTPATIENT)
Dept: FAMILY MEDICINE | Facility: CLINIC | Age: 70
End: 2025-03-23

## 2025-03-24 ENCOUNTER — HOSPITAL ENCOUNTER (OUTPATIENT)
Dept: PULMONOLOGY | Facility: HOSPITAL | Age: 70
Discharge: HOME | End: 2025-03-24
Attending: STUDENT IN AN ORGANIZED HEALTH CARE EDUCATION/TRAINING PROGRAM
Payer: MEDICARE

## 2025-03-24 DIAGNOSIS — R06.00 DYSPNEA, UNSPECIFIED TYPE: ICD-10-CM

## 2025-03-24 PROCEDURE — 94726 PLETHYSMOGRAPHY LUNG VOLUMES: CPT

## 2025-03-24 PROCEDURE — 94060 EVALUATION OF WHEEZING: CPT

## 2025-03-24 PROCEDURE — 94729 DIFFUSING CAPACITY: CPT

## 2025-03-24 RX ORDER — ALBUTEROL SULFATE 2.5 MG/.5ML
SOLUTION RESPIRATORY (INHALATION)
Status: DISPENSED
Start: 2025-03-24 | End: 2025-03-24

## 2025-03-26 ENCOUNTER — PATIENT MESSAGE (OUTPATIENT)
Dept: FAMILY MEDICINE | Facility: CLINIC | Age: 70
End: 2025-03-26
Payer: MEDICARE

## 2025-03-28 LAB
DLCO SINGLE BREATH LLN: 18.47
DLCO SINGLE BREATH PRE REF: 68.9 %
DLCO SINGLE BREATH REF: 24.2
DLCOC SBVA LLN: 3.04
DLCOC SBVA REF: 4.31
DLCOC SINGLE BREATH LLN: 18.47
DLCOC SINGLE BREATH REF: 24.2
DLCOVA LLN: 3.04
DLCOVA PRE REF: 89.7 %
DLCOVA PRE: 3.87 ML/(MIN*MMHG*L) (ref 3.04–5.59)
DLCOVA REF: 4.31
ERV LLN: -16449.26
ERV PRE REF: 175.8 %
ERV REF: 0.74
FEF 25 75 CHG: 39 %
FEF 25 75 LLN: 1.4
FEF 25 75 POST REF: 93 %
FEF 25 75 PRE REF: 66.9 %
FEF 25 75 REF: 2.8
FET100 CHG: -7.5 %
FEV1 CHG: 5.8 %
FEV1 FVC CHG: 6.8 %
FEV1 FVC LLN: 65
FEV1 FVC POST REF: 101.8 %
FEV1 FVC PRE REF: 95.4 %
FEV1 FVC REF: 78
FEV1 LLN: 1.9
FEV1 POST REF: 98.4 %
FEV1 PRE REF: 93 %
FEV1 REF: 2.59
FRCPLETH LLN: 2.11
FRCPLETH PREREF: 96.5 %
FRCPLETH REF: 2.94
FVC CHG: -0.9 %
FVC LLN: 2.47
FVC POST REF: 95.8 %
FVC PRE REF: 96.6 %
FVC REF: 3.35
IVC PRE: 3.05 L (ref 2.47–4.28)
IVC SINGLE BREATH LLN: 2.47
IVC SINGLE BREATH PRE REF: 91.1 %
IVC SINGLE BREATH REF: 3.35
MVV LLN: 87
MVV PRE REF: 81.3 %
MVV REF: 102
PEF CHG: 13.2 %
PEF LLN: 4.56
PEF POST REF: 78.5 %
PEF PRE REF: 69.4 %
PEF REF: 6.5
POST FEF 25 75: 2.6 L/S (ref 1.4–4.2)
POST FET 100: 8.14 SEC
POST FEV1 FVC: 79.33 % (ref 65–88.96)
POST FEV1: 2.55 L (ref 1.9–3.24)
POST FVC: 3.21 L (ref 2.47–4.28)
POST PEF: 5.1 L/S (ref 4.56–8.43)
PRE DLCO: 16.68 ML/(MIN*MMHG) (ref 18.47–29.94)
PRE ERV: 1.3 L (ref -16449.26–16450.74)
PRE FEF 25 75: 1.87 L/S (ref 1.4–4.2)
PRE FET 100: 8.81 SEC
PRE FEV1 FVC: 74.28 % (ref 65–88.96)
PRE FEV1: 2.4 L (ref 1.9–3.24)
PRE FRC PL: 2.83 L (ref 2.11–3.76)
PRE FVC: 3.24 L (ref 2.47–4.28)
PRE MVV: 82.87 L/MIN (ref 86.61–117.18)
PRE PEF: 4.51 L/S (ref 4.56–8.43)
PRE RV: 1.54 L (ref 1.62–2.77)
PRE TLC: 4.77 L (ref 4.62–6.6)
RAW LLN: 3.06
RAW PRE REF: 65.2 %
RAW PRE: 1.99 CMH2O*S/L (ref 3.06–3.06)
RAW REF: 3.06
RV LLN: 1.62
RV PRE REF: 69.9 %
RV REF: 2.2
RVTLC LLN: 32
RVTLC PRE REF: 77.1 %
RVTLC PRE: 32.2 % (ref 32.15–51.33)
RVTLC REF: 42
TLC LLN: 4.62
TLC PRE REF: 85.1 %
TLC REF: 5.61
VA PRE: 4.31 L (ref 5.46–5.46)
VA SINGLE BREATH LLN: 5.46
VA SINGLE BREATH PRE REF: 78.9 %
VA SINGLE BREATH REF: 5.46
VC LLN: 2.47
VC PRE REF: 96.6 %
VC PRE: 3.24 L (ref 2.47–4.28)
VC REF: 3.35

## 2025-04-28 ENCOUNTER — OFFICE VISIT (OUTPATIENT)
Dept: OBSTETRICS AND GYNECOLOGY | Facility: CLINIC | Age: 70
End: 2025-04-28
Payer: MEDICARE

## 2025-04-28 VITALS
WEIGHT: 204.13 LBS | HEIGHT: 68 IN | SYSTOLIC BLOOD PRESSURE: 122 MMHG | DIASTOLIC BLOOD PRESSURE: 66 MMHG | RESPIRATION RATE: 17 BRPM | BODY MASS INDEX: 30.94 KG/M2

## 2025-04-28 DIAGNOSIS — Z87.410 HISTORY OF CERVICAL DYSPLASIA: Primary | ICD-10-CM

## 2025-04-28 DIAGNOSIS — N95.2 VAGINAL ATROPHY: ICD-10-CM

## 2025-04-28 PROCEDURE — 88305 TISSUE EXAM BY PATHOLOGIST: CPT | Mod: TC,91 | Performed by: GENERAL PRACTICE

## 2025-04-28 PROCEDURE — 99999 PR PBB SHADOW E&M-EST. PATIENT-LVL II: CPT | Mod: PBBFAC,,, | Performed by: GENERAL PRACTICE

## 2025-04-28 PROCEDURE — 57454 BX/CURETT OF CERVIX W/SCOPE: CPT | Mod: S$GLB,,, | Performed by: GENERAL PRACTICE

## 2025-04-28 PROCEDURE — 99499 UNLISTED E&M SERVICE: CPT | Mod: S$GLB,,, | Performed by: GENERAL PRACTICE

## 2025-04-28 PROCEDURE — 88175 CYTOPATH C/V AUTO FLUID REDO: CPT | Mod: TC | Performed by: GENERAL PRACTICE

## 2025-04-28 PROCEDURE — 87624 HPV HI-RISK TYP POOLED RSLT: CPT | Performed by: GENERAL PRACTICE

## 2025-04-28 RX ORDER — ESTRADIOL 10 UG/1
1 TABLET, FILM COATED VAGINAL
Qty: 24 TABLET | Refills: 3 | Status: SHIPPED | OUTPATIENT
Start: 2025-04-28 | End: 2026-04-28

## 2025-04-28 NOTE — PATIENT INSTRUCTIONS
BIOPSIES (endometrial, cervical, vaginal, vulvar, etc.):    Biopsy results can take anywhere from 2 to 10 days to return from the pathologist.    If the treatment plan is simple or unchanged based on the biopsy result, I'll probably send a message in [employer].    If the treatment plan is complex and/or the biopsy result is cancer, I'll call you to discuss.  In some cases, I'll have my staff schedule an appointment for you to come in to discuss things in person.    If it's been two weeks since your biopsy, and you haven't heard from us, PLEASE REACH OUT to check on things.     COLPOSCOPY:  PROCEDURE:  The doctor will insert a speculum into the vagina in order to visualize the cervix.  The next steps include:  Applying liquid solution(s) to the cervix to help highlight abnormal areas  Using a colposcope to visualize and inspect the cervix under magnification, looking for suspicious areas  Taking small biopsies of the cervix and/or the endocervical canal (the opening that leads into the uterus)  This usually causes little or no pain, so we do not use an anesthetic  Applying pressure and/or a chemical to stop bleeding from the biopsy site(s)    AFTERWARDS:  You should not be uncomfortable to any significant degree after the procedure.  If you are, let your doctor know.  It is normal to have a few days of light bleeding afterwards.  You might also notice black and/or mustard-yellow stains from the cautery chemicals.  For one week you should not put anything in the vagina (no sex, no tampons, no douching, no toys, etc.).  It is okay to shower or bathe.  Call or message the clinic for: foul-smelling vaginal discharge, discomfort that is not relieved by over-the-counter medications, heavy vaginal bleeding, fevers/chills.

## 2025-04-28 NOTE — PROGRESS NOTES
Background   10/02/2024  Bailey Joseph is a 69 y.o. here for WWE.  Hasn't seen a gynecologist in many years.  Also concerned about a vaginal odor.  Uses boric acid suppositories maybe twice a week.  For about a year.  Sometimes fishy.  No discharge.  No itching.  Started Wellbutrin for weight loss 4-6 weeks ago.  Doing so prompted return of hot flashes.  Has since stopped the Wellbutrin, maybe for about a week.  Prior to being on Wellbutrin didn't have any hot flashes. Exam c/w BV but no vaginitis swab sent.  -->Rx flagyl     10/30/2024   Bailey Joseph is a 69 y.o. here for colposcopy.  Hot flashes are better off the Wellbutrin.  Completed the course of flagyl.  Hasn't been using the boric acid.    2024  69 y.o. with JANN 1 per recent colposcopy, preceded by ASC-H in setting of at least moderate vulvovaginal atrophy.  Discussed options of excisional biopsy (LEEP) now versus watchful waiting with PAP/HPV + colpo in 6 months after regular use of vaginal estrogen in the meantime.  She opts for watchful waiting.  --Rx: vaginal estrogen  --Return to clinic:  late 2025 for PAP/HPV + colpo, sooner prn     ASSESSMENT AND PLAN     2025  69 y.o. s/p colposcopy today.  Atrophy definitely improved with use of vaginal estrogen.    Will f/u results in MyChart and/or call patient with results  Post-procedure expectations and restrictions reviewed and can be found in AVS  Rx: Vagifem tablets  Mammogram: scheduled soon  Return to clinic: TBD    SUBJECTIVE   2025  Bailey Joseph is a 69 y.o. here for PAP/HPV + colpo to follow cervical dysplasia (see below).  Has been using the vaginal estrogen cream but says it's poorly covered by her insurance and wonders if there's something more affordable.     G'sP's:   LMP: age 52  Relationship: not sexually active and boyfriend x 23 years  PAP Hx: see Dysplasia Hx below  MMG (24) = negative  HRT: never used   BLEEDING: denies     DYSPLASIA HISTORY:  2017:   PAP NEG / HPV POS (-16/-18) <-- first-ever abnormal  10/14/2024: ASC-H / HPV POS (-16/-18)  10/30/2024: JANN 1 @ 12, JANN 1 @ ECC    CONSENT FOR COLPOSCOPY   Informed consent obtained verbally for the procedure. Discussed risks of pain, bleeding, infection, inadequate or missed diagnosis.  Discussed alternative of watchful waiting, which could allow development or progression of cancer, and where a delayed diagnosis could worsen prognosis.      OBJECTIVE     PHYSICAL EXAM  Vitals:    04/28/25 1057   BP: 122/66   Resp: 17     GEN = alert/oriented, nad, pleasant  HEENT = sclera anicteric, EOM grossly normal  CV = BP and HR as per vitals  PULM = normal respiratory effort   =      External: nefg, mildly atrophic     Vagina: no lesions, mildly atrophied, urethral meatus normal     Discharge: minimal     Cervix: normal-appearing, atrophic, PAP smear obtained    SITE VERIFICATION:  Proper patient, procedure, and site confirmed prior to start.    COLPOSCOPY PROCEDURE:  ADEQUATE EXAM: Yes, TRANSFORMATION ZONE: narrow  ACETOWHITE CHANGES: No  OTHER ABNORMALITIES: No  LUGOL'S APPLIED: No  PAP REPEATED: Yes  CERVICAL BX @ 3 o'clock  ECC BX: Yes  IMPRESSION : atrophy (improved), mild dysplasia or normal

## 2025-04-30 ENCOUNTER — RESULTS FOLLOW-UP (OUTPATIENT)
Dept: OBSTETRICS AND GYNECOLOGY | Facility: CLINIC | Age: 70
End: 2025-04-30

## 2025-04-30 LAB
ESTROGEN SERPL-MCNC: NORMAL PG/ML
INSULIN SERPL-ACNC: NORMAL U[IU]/ML
LAB AP CLINICAL INFORMATION: NORMAL
LAB AP DIAGNOSIS CATEGORY: NORMAL
LAB AP GROSS DESCRIPTION: NORMAL
LAB AP PERFORMING LOCATION(S): NORMAL
LAB AP REPORT FOOTNOTES: NORMAL

## 2025-04-30 NOTE — PROGRESS NOTES
Ms. Avalos,    The cervical biopsy shows JANN 1 again / still (mild dysplasia).  I'll be in touch again about a final recommendation once I have the PAP and HPV results.    Sincerely,  Dr. Arguello

## 2025-05-01 LAB
INSULIN SERPL-ACNC: ABNORMAL U[IU]/ML
LAB AP BETHESDA CATEGORY: ABNORMAL
LAB AP CLINICAL FINDINGS: ABNORMAL
LAB AP CONTRACEPTIVES: ABNORMAL
LAB AP LMP DATE: ABNORMAL
LAB AP OCHS PAP SPECIMEN ADEQUACY: ABNORMAL
LAB AP OHS PAP INTERPRETATION: ABNORMAL
LAB AP PAP DISCLAIMER COMMENTS: ABNORMAL
LAB AP PAP ESTROGEN REPLACEMENT THERAPY: ABNORMAL
LAB AP PAP PMP: ABNORMAL
LAB AP PAP PREVIOUS BX: ABNORMAL
LAB AP PAP PRIOR TREATMENT: ABNORMAL
LAB AP PERFORMING LOCATION(S): ABNORMAL

## 2025-05-08 ENCOUNTER — HOSPITAL ENCOUNTER (OUTPATIENT)
Dept: RADIOLOGY | Facility: HOSPITAL | Age: 70
Discharge: HOME | End: 2025-05-08
Attending: STUDENT IN AN ORGANIZED HEALTH CARE EDUCATION/TRAINING PROGRAM
Payer: MEDICARE

## 2025-05-08 VITALS — HEIGHT: 68 IN | WEIGHT: 204 LBS | BODY MASS INDEX: 30.92 KG/M2

## 2025-05-08 DIAGNOSIS — Z12.31 ENCOUNTER FOR SCREENING MAMMOGRAM FOR BREAST CANCER: ICD-10-CM

## 2025-05-08 PROCEDURE — 77067 SCR MAMMO BI INCL CAD: CPT | Mod: 26,,, | Performed by: RADIOLOGY

## 2025-05-08 PROCEDURE — 77063 BREAST TOMOSYNTHESIS BI: CPT | Mod: 26,,, | Performed by: RADIOLOGY

## 2025-05-08 PROCEDURE — 77067 SCR MAMMO BI INCL CAD: CPT | Mod: TC,PO

## 2025-05-10 ENCOUNTER — RESULTS FOLLOW-UP (OUTPATIENT)
Dept: FAMILY MEDICINE | Facility: CLINIC | Age: 70
End: 2025-05-10

## 2025-05-13 ENCOUNTER — TELEPHONE (OUTPATIENT)
Dept: OBSTETRICS AND GYNECOLOGY | Facility: CLINIC | Age: 70
End: 2025-05-13
Payer: MEDICARE

## 2025-05-13 NOTE — TELEPHONE ENCOUNTER
----- Message from Sabi Arguello MD sent at 5/13/2025 11:27 AM CDT -----  Please call this patient and let her know that I'd like to discuss her results and recommended course of action in person.  I think she should have a LEEP done in the OR.  Please help schedule an   appointment with me.        ----- Message -----  From: Lab, Background User  Sent: 4/30/2025  11:01 AM CDT  To: Sabi Arguello MD

## 2025-05-13 NOTE — PROGRESS NOTES
Please call this patient and let her know that I'd like to discuss her results and recommended course of action in person.  I think she should have a LEEP done in the OR.  Please help schedule an appointment with me.

## 2025-05-13 NOTE — TELEPHONE ENCOUNTER
LVM for pt to contact office to schedule appt with Dr. Arguello to discuss results and next course of action.

## 2025-05-14 ENCOUNTER — TELEPHONE (OUTPATIENT)
Dept: OBSTETRICS AND GYNECOLOGY | Facility: CLINIC | Age: 70
End: 2025-05-14
Payer: MEDICARE

## 2025-05-14 NOTE — TELEPHONE ENCOUNTER
Contacted pt to schedule appt with Dr. Arguello to discuss colpo results and next course of action, possible LEEP in OR. Appt scheduled for 5/21/25 @ 11:30 am. Pt verbalized understanding.

## 2025-05-21 ENCOUNTER — OFFICE VISIT (OUTPATIENT)
Dept: OBSTETRICS AND GYNECOLOGY | Facility: CLINIC | Age: 70
End: 2025-05-21
Payer: MEDICARE

## 2025-05-21 VITALS
DIASTOLIC BLOOD PRESSURE: 62 MMHG | BODY MASS INDEX: 30.86 KG/M2 | HEIGHT: 68 IN | WEIGHT: 203.63 LBS | SYSTOLIC BLOOD PRESSURE: 112 MMHG

## 2025-05-21 DIAGNOSIS — N87.0 DYSPLASIA OF CERVIX, LOW GRADE (CIN 1): Primary | ICD-10-CM

## 2025-05-21 PROCEDURE — 3078F DIAST BP <80 MM HG: CPT | Mod: CPTII,S$GLB,, | Performed by: GENERAL PRACTICE

## 2025-05-21 PROCEDURE — 99999 PR PBB SHADOW E&M-EST. PATIENT-LVL III: CPT | Mod: PBBFAC,,, | Performed by: GENERAL PRACTICE

## 2025-05-21 PROCEDURE — 3044F HG A1C LEVEL LT 7.0%: CPT | Mod: CPTII,S$GLB,, | Performed by: GENERAL PRACTICE

## 2025-05-21 PROCEDURE — 3008F BODY MASS INDEX DOCD: CPT | Mod: CPTII,S$GLB,, | Performed by: GENERAL PRACTICE

## 2025-05-21 PROCEDURE — 3074F SYST BP LT 130 MM HG: CPT | Mod: CPTII,S$GLB,, | Performed by: GENERAL PRACTICE

## 2025-05-21 PROCEDURE — 1101F PT FALLS ASSESS-DOCD LE1/YR: CPT | Mod: CPTII,S$GLB,, | Performed by: GENERAL PRACTICE

## 2025-05-21 PROCEDURE — 3288F FALL RISK ASSESSMENT DOCD: CPT | Mod: CPTII,S$GLB,, | Performed by: GENERAL PRACTICE

## 2025-05-21 PROCEDURE — 99214 OFFICE O/P EST MOD 30 MIN: CPT | Mod: S$GLB,,, | Performed by: GENERAL PRACTICE

## 2025-05-21 RX ORDER — IBUPROFEN 800 MG/1
800 TABLET, FILM COATED ORAL 3 TIMES DAILY PRN
Qty: 30 TABLET | Refills: 0 | Status: SHIPPED | OUTPATIENT
Start: 2025-05-21

## 2025-05-21 RX ORDER — AMOXICILLIN 250 MG
1 CAPSULE ORAL DAILY PRN
Qty: 30 TABLET | Refills: 0 | Status: SHIPPED | OUTPATIENT
Start: 2025-05-21

## 2025-05-21 RX ORDER — HYDROCODONE BITARTRATE AND ACETAMINOPHEN 5; 325 MG/1; MG/1
1 TABLET ORAL EVERY 4 HOURS PRN
Qty: 4 TABLET | Refills: 0 | Status: SHIPPED | OUTPATIENT
Start: 2025-05-21

## 2025-05-21 RX ORDER — SODIUM CHLORIDE 9 MG/ML
INJECTION, SOLUTION INTRAVENOUS CONTINUOUS
OUTPATIENT
Start: 2025-05-21

## 2025-05-21 NOTE — PROGRESS NOTES
Background   10/02/2024  Bailey Joseph is a 69 y.o. here for WWE.     10/30/2024   Bailey Joseph is a 69 y.o. here for colposcopy.  Hot flashes are better off the Wellbutrin.  Completed the course of flagyl.  Hasn't been using the boric acid.     2024  69 y.o. with JANN 1 per recent colposcopy, preceded by ASC-H in setting of at least moderate vulvovaginal atrophy.  Discussed options of excisional biopsy (LEEP) now versus watchful waiting. --Rx: vaginal estrogen    2025  69 y.o. s/p colposcopy today.  Atrophy definitely improved with use of vaginal estrogen.       ASSESSMENT AND PLAN   2025  69 y.o.  with persistent JANN 1, precedent ASC-H PAP, and inadequate colposcopy.  Discussed continued outpatient surveillance versus excisional biopsy, and she'd like to proceed with LEEP.    Planned surgery: LEEP with ECC    Informed consent obtained for surgery.  The patient wishes to proceed with the above listed procedure(s).  She unserstands risks of surgery include bleeding, need for blood transfusion, infection, pain, scarring, damage to nearby organs or tissues, need for other surgery, inadequate diagnosis, inadequate treatment of her symptoms, stroke, heart attack, blood clots such as DVT or PE, complications from anesthesia, allergic reactions to medications or devices, and death.    OR date: 2025    Anesthesia plan: choice    Antibiotic prophylaxis: not indicated    Medication management: morning meds with a sip of water unless otherwise directed by hospital/anesthesia staff    Per nursing staff:  Postoperative expectations reviewed.  Pelvic rest, lifting and activity restrictions, driving restrictions, and medications.    Leslie L Weeks, MD [facility]    SUBJECTIVE   2025  Bailey Joseph is a 69 y.o. here for discussion about management options after recent PAP / HPV and colposcopy.  Full medical history reviewed and updated today (below).     DYSPLASIA  HISTORY:  2017:  PAP NEG / HPV POS (-16/-18) <-- first-ever abnormal  10/14/2024: ASC-H / HPV POS (-16/-18)  10/30/2024: JANN 1 @ 12, JANN 1 @ ECC  2025: LSIL / HPV POS (-16/-18)   2025: JANN 1 @ 3, ECC = insufficient     G'sP's:   LMP: age 52  Relationship: not sexually active and boyfriend x 23 years  PAP Hx: see Dysplasia Hx below  MMG (2025) = negative  HRT: never used   BLEEDING: denies     OBJECTIVE   PHYSICAL EXAM  Vitals:    25 1137   BP: 112/62     GEN = alert/oriented, nad, pleasant  HEENT = sclera anicteric, EOM grossly normal  CV = BP and HR as per vitals  PULM = normal respiratory effort   = deferred, no acute concerns    LABS AND RADS    Lab Results   Component Value Date    WBC 4.08 2025    HGB 12.1 2025    HCT 37.4 2025     2025    MCV 92 2025      -----------------------  2025     GYNECOLOGIC HISTORY  Genital HSV: no  STD Hx: no past history  Menarche: 17 yoa  Non-menstrual pelvic pressure/pain: No  Dyspareunia: N/A  Vasomotor Sxs: yes, typically 2 per day and yes, typically 2 per night  Vaginal dryness: denies    OBSTETRIC HISTORY  Living children: 2  Vaginal deliveries: 2     PAST MEDICAL HISTORY  Past Medical History:   Diagnosis Date    Anemia     BMI 30.0-30.9,adult     Mild intermittent asthma, uncomplicated     Prediabetes        PAST SURGICAL HISTORY  Past Surgical History:   Procedure Laterality Date    BREAST BIOPSY Left     COLONOSCOPY N/A 05/10/2023    Procedure: COLONOSCOPY;  Surgeon: Angelica Washburn MD;  Location: [facility];  Service: Endoscopy;  Laterality: N/A;    LAPAROSCOPIC CHOLECYSTECTOMY N/A 2023    Procedure: CHOLECYSTECTOMY, LAPAROSCOPIC;  Surgeon: Jean-Claude Salazar III, MD;  Location: [facility];  Service: General;  Laterality: N/A;    LIPOMA RESECTION      TONSILLECTOMY        ALLERGIES  Review of patient's allergies indicates:  No Known Allergies      MEDICATIONS  Current Outpatient Medications    Medication Instructions    estradioL (VAGIFEM) 10 mcg, Vaginal, Twice weekly    ferrous sulfate, dried (SLOW FE) 160 mg, Daily       SOCIAL HISTORY  Lives with: BF  Smoker: non-smoker  Alcohol: yes, rare  Drugs: denies  Domestic Violence: no  Occupation: homemaker     FAMILY HISTORY  BLEEDING or  CLOTTING DISORDERS: none  BREAST CA:  niece  UTERINE CA: none  OVARIAN CA: none  COLON CA: none

## 2025-05-23 NOTE — PATIENT INSTRUCTIONS
"[facility] Dermatology Clinic Note     Patient Name: Avelino Espinoza  Encounter Date: 5/23/25       Have you been cared for by a [facility] Dermatologist in the last 3 years and, if so, which description applies to you? NO. I am considered a \"new\" patient and must complete all patient intake questions. I am not of child-bearing potential.     REVIEW OF SYSTEMS:  Have you recently had or currently have any of the following? Recent fever or chills? No  Any non-healing wound? No   PAST MEDICAL HISTORY:  Have you personally ever had or currently have any of the following?  If \"YES,\" then please provide more detail. Skin cancer (such as Melanoma, Basal Cell Carcinoma, Squamous Cell Carcinoma?  No  Tuberculosis, HIV/AIDS, Hepatitis B or C: No  Radiation Treatment No   HISTORY OF IMMUNOSUPPRESSION:   Do you have a history of any of the following:  Systemic Immunosuppression such as Diabetes, Biologic or Immunotherapy, Chemotherapy, Organ Transplantation, Bone Marrow Transplantation or Prednisone?  No     Answering \"YES\" requires the addition of the dotphrase \"IMMUNOSUPPRESSED\" as the first diagnosis of the patient's visit.   FAMILY HISTORY:  Any \"first degree relatives\" (parent, brother, sister, or child) with the following?    Skin Cancer, Pancreatic or Other Cancer? No   PATIENT EXPERIENCE:    Do you want the Dermatologist to perform a COMPLETE skin exam today including a clinical examination under the \"bra and underwear\" areas?  Yes  If necessary, do we have your permission to call and leave a detailed message on your Preferred Phone number that includes your specific medical information?  Yes      Allergies[1] Current Medications[2]              Whom besides the patient is providing clinical information about today's encounter?   NO ADDITIONAL HISTORIAN (patient alone provided history)    Physical Exam and Assessment/Plan by Diagnosis:      MELANOCYTIC NEVI (\"Moles\")    Physical Exam:  Anatomic Location Affected:   " Understanding Plantar Fasciitis    Plantar fasciitis is a condition that causes foot and heel pain. The plantar fascia is a tough band of tissue that runs across the bottom of the foot from the heel to the toes. This tissue pulls on the heel bone. It supports the arch of the foot as it pushes off the ground. If the tissue becomes irritated or red and swollen (inflamed), it is called plantar fasciitis.  How to say it  PLAN-tuhr fa-see-IY-[id number]     What causes plantar fasciitis?  Plantar fasciitis most often occurs from overusing the plantar fascia. The tissue may become damaged from activities that put repeated stress on the heel and foot. Or it may wear down over time with age and ankle stiffness. You are more likely to have plantar fasciitis if you:  Do activities that require a lot of running, jumping, or dancing  Have a job that requires being on your feet for long periods  Are overweight or obese  Have certain foot problems, such as a tight Achilles tendon, flat feet, or high arches  Often wear poorly fitting shoes    Symptoms of plantar fasciitis  The condition most often causes pain in the heel and the bottom of the foot. The pain may occur when you take your first steps in the morning. It may get better as you walk throughout the day. But as you continue to put weight on the foot, the pain often returns. Pain may also occur after standing or sitting for long periods.    Treating plantar fasciitis  Treatments for plantar fasciitis include:  Resting the foot. This involves limiting movements that make your foot hurt. You may also need to avoid certain sports and types of work for a time.  Using cold packs. Put an ice pack on the heel and foot to help reduce pain and swelling.  Taking pain medicines. Prescription and over-the-counter pain medicines can help relieve pain and swelling.  Using heel cups or foot inserts (orthotics). These are placed in the shoes to help support the heel or arch and cushion the heel.  "Mostly on sun-exposed areas of the trunk and extremities   Morphological Description:  Scattered, 1-4mm round to ovoid, symmetrical-appearing, even bordered, skin colored to dark brown macules/papules      Additional History of Present Condition:  Present on exam. Denies any pain, itch, bleeding. Present for years. Denies actively changing or growing moles.     Assessment and Plan:  Based on a thorough discussion of this condition and the management approach to it (including a comprehensive discussion of the known risks, side effects and potential benefits of treatment), the patient (family) agrees to implement the following specific plan:  Reassured benign.   Monitor for changes. ABCDE's of melanoma handout provided.   Practice sun protection. Apply broad spectrum (UVA and UVB) sunscreen, SPF 30 or higher every 2 hours. Wear sun protective clothing, hats, and sunglasses.        LENTIGO    Physical Exam:  Anatomic Location Affected:  sun exposed areas of trunk and extremitites  Morphological Description:  multiple scattered tan to brown evenly pigmented macules      Additional History of Present Condition:  Present on exam.     Assessment and Plan:  Based on a thorough discussion of this condition and the management approach to it (including a comprehensive discussion of the known risks, side effects and potential benefits of treatment), the patient (family) agrees to implement the following specific plan:  Reassured benign.   Practice sun protection. Apply broad spectrum (UVA and UVB) sunscreen, SPF 30 or higher every 2 hours. Wear sun protective clothing, hats, and sunglasses.         ANGIOMA (\"CHERRY ANGIOMA\")     Physical Exam:  Anatomic Location: scattered across trunk and extremities   Morphologic Description: 1-2 mm firm red to maroon to purples to blue discrete papule, on dermoscopy lacunae  by white septae seen       Additional History of Present Condition:  Present on exam.     Plan:  Reassured " You may also be told to buy proper-fitting shoes with good arch support and cushioned soles.  Taping the foot. This supports the arch and limits the movement of the plantar fascia to help relieve symptoms.  Wearing a night splint. This stretches the plantar fascia and leg muscles while you sleep. This may help relieve pain.  Doing exercises and physical therapy. These stretch and strengthen the plantar fascia and the muscles in the leg that support the heel and foot.  Getting shots of medicine into the foot. These may help relieve symptoms for a time.  Having surgery. This may be needed if other treatments fail to relieve symptoms. During surgery, the surgeon may partially cut the plantar fascia to release tension.    Possible complications of plantar fasciitis  Without proper care and treatment, healing may take longer than normal. Also, symptoms may continue or get worse. Over time, the plantar fascia may be damaged. This can make it hard to walk or even stand without pain.    When to call your healthcare provider  Call your healthcare provider right away if you have any of these:  Fever of 100.4°F (38°C) or higher, or as directed  Symptoms that dont get better with treatment, or get worse  New symptoms, such as numbness, tingling, or weakness in the foot     Date Last Reviewed: 3/10/2016  © 9598-5880 [employer] [address] [place] [address], [place] [address]. All rights reserved. This information is not intended as a substitute for professional medical care. Always follow your healthcare professional's instructions.          "benign.       NEOPLASM OF UNCERTAIN BEHAVIOR OF SKIN    Physical Exam:  (Anatomic Location); (Size and Morphological Description); (Differential Diagnosis):  Specimen A: Right posterior shoulder; 0.4 cm x 0.4 cm irregularly pigmented papule; ddx benign nevus vs atypical nevus; rule out melanoma    Additional History of Present Condition: Patient notes the PCP was worried about this spot. He states it has gotten a lot darker.     Assessment and Plan:  I have discussed with the patient that a sample of skin via a \"skin biopsy” would be potentially helpful to further make a specific diagnosis under the microscope.  Based on a thorough discussion of this condition and the management approach to it (including a comprehensive discussion of the known risks, side effects and potential benefits of treatment), the patient (family) agrees to implement the following specific plan:    Procedure:  Skin Biopsy.  After a thorough discussion of treatment options and risk/benefits/alternatives (including but not limited to local pain, scarring, dyspigmentation, blistering, possible superinfection, and inability to confirm a diagnosis via histopathology), verbal and written consent were obtained and portion of the rash was biopsied for tissue sample.  See below for consent that was obtained from patient and subsequent Procedure Note.  PROCEDURE TANGENTIAL (SHAVE) BIOPSY NOTE:    Performing Physician: Maryse Gonzalez PA-C   Anatomic Location; Clinical Description with size (cm); Pre-Op Diagnosis:   Specimen A: Right posterior shoulder; 0.4 cm x 0.4 cm irregularly pigmented papule; ddx benign nevus vs atypical nevus; rule out melanoma  Post-op diagnosis: Same     Local anesthesia: 1% xylocaine with epi      Topical anesthesia: None    Hemostasis: Aluminum chloride       After obtaining informed consent  at which time there was a discussion about the purpose of biopsy  and low risks of infection and bleeding.  The area was prepped and " "draped in the usual fashion. Anesthesia was obtained with 1% lidocaine with epinephrine. A shave biopsy to an appropriate sampling depth was obtained by Shave (Dermablade or 15 blade) The resulting wound was covered with surgical ointment and bandaged appropriately.     The patient tolerated the procedure well without complications and was without signs of functional compromise.      Specimen has been sent for review by Dermatopathology.    Standard post-procedure care has been explained and has been included in written form within the patient's copy of Informed Consent.    INFORMED CONSENT DISCUSSION AND POST-OPERATIVE INSTRUCTIONS FOR PATIENT    I.  RATIONALE FOR PROCEDURE  I understand that a skin biopsy allows the Dermatologist to test a lesion or rash under the microscope to obtain a diagnosis.  It usually involves numbing the area with numbing medication and removing a small piece of skin; sometimes the area will be closed with sutures. In this specific procedure, sutures are not usually needed.  If any sutures are placed, then they are usually need to be removed in 2 weeks or less.    I understand that my Dermatologist recommends that a skin \"shave\" biopsy be performed today.  A local anesthetic, similar to the kind that a dentist uses when filling a cavity, will be injected with a very small needle into the skin area to be sampled.  The injected skin and tissue underneath \"will go to sleep” and become numb so no pain should be felt afterwards.  An instrument shaped like a tiny \"razor blade\" (shave biopsy instrument) will be used to cut a small piece of tissue and skin from the area so that a sample of tissue can be taken and examined more closely under the microscope.  A slight amount of bleeding will occur, but it will be stopped with direct pressure and a pressure bandage and any other appropriate methods.  I understands that a scar will form where the wound was created.  Surgical ointment will be applied " "to help protect the wound.  Sutures are not usually needed.    II.  RISKS AND POTENTIAL COMPLICATIONS   I understand the risks and potential complications of a skin biopsy include but are not limited to the following:  Bleeding  Infection  Pain  Scar/keloid  Skin discoloration  Incomplete Removal  Recurrence  Nerve Damage/Numbness/Loss of Function  Allergic Reaction to Anesthesia  Biopsies are diagnostic procedures and based on findings additional treatment or evaluation may be required  Loss or destruction of specimen resulting in no additional findings    My Dermatologist has explained to me the nature of the condition, the nature of the procedure, and the benefits to be reasonably expected compared with alternative approaches.  My Dermatologist has discussed the likelihood of major risks or complications of this procedure including the specific risks listed above, such as bleeding, infection, and scarring/keloid.  I understand that a scar is expected after this procedure.  I understand that my physician cannot predict if the scar will form a \"keloid,\" which extends beyond the borders of the wound that is created.  A keloid is a thick, painful, and bumpy scar.  A keloid can be difficult to treat, as it does not always respond well to therapy, which includes injecting cortisone directly into the keloid every few weeks.  While this usually reduces the pain and size of the scar, it does not eliminate it.      I understand that photographs may be taken before and after the procedure.  These will be maintained as part of the medical providers confidential records and may not be made available to me.  I further authorize the medical provider to use the photographs for teaching purposes or to illustrate scientific papers, books, or lectures if in his/her judgment, medical research, education, or science may benefit from its use.    I have had an opportunity to fully inquire about the risks and benefits of this procedure " "and its alternatives.   I have been given ample time and opportunity to ask questions and to seek a second opinion if I wished to do so.  I acknowledge that there have specifically been no guarantees as to the cosmetic results from the procedure.  I am aware that with any procedure there is always the possibility of an unexpected complication.    III. POST-PROCEDURAL CARE (WHAT YOU WILL NEED TO DO \"AFTER THE BIOPSY\" TO OPTIMIZE HEALING)    Keep the area clean and dry.  Try NOT to remove the bandage or get it wet for the first 24 hours.    Gently clean the area and apply surgical ointment (such as Vaseline petrolatum ointment, which is available \"over the counter\" and not a prescription) to the biopsy site for up to 2 weeks straight.  This acts to protect the wound from the outside world.      Sutures are not usually placed in this procedure.  If any sutures were placed, return for suture removal as instructed (generally 1 week for the face, 2 weeks for the body).      Take Acetaminophen (Tylenol) for discomfort, if no contraindications.  Ibuprofen or aspirin could make bleeding worse.    Call our office immediately for signs of infection: fever, chills, increased redness, warmth, tenderness, discomfort/pain, or pus or foul smell coming from the wound.    WHAT TO DO IF THERE IS ANY BLEEDING?  If a small amount of bleeding is noticed, place a clean cloth over the area and apply firm pressure for ten minutes.  Check the wound after 10 minutes of direct pressure.  If bleeding persists, try one more time for an additional 10 minutes of direct pressure on the area.  If the bleeding becomes heavier or does not stop after the second attempt, or if you have any other questions about this procedure, then please call your [facility] Dermatologist by calling [phone] (SKIN).     I hereby acknowledge that I have reviewed and verified the site with my Dermatologist and have requested and authorized my Dermatologist to proceed " with the procedure.      Scribe Attestation      I,:  Fadia Jay MA am acting as a scribe while in the presence of the attending physician.:       I,:  Maryse Gonzalez PA-C personally performed the services described in this documentation    as scribed in my presence.:                [1] No Known Allergies  [2]   Current Outpatient Medications:     albuterol (Ventolin HFA) 90 mcg/act inhaler, Inhale 2 puffs every 6 (six) hours as needed for wheezing Pt  will be  getting  copay  coupon through  [employer] to decrease cost, Disp: 54 g, Rfl: 1    amoxicillin-clavulanate (AUGMENTIN) 875-125 mg per tablet, Take 1 tablet by mouth every 12 (twelve) hours for 10 days, Disp: 20 tablet, Rfl: 0    betamethasone, augmented, (DIPROLENE-AF) 0.05 % cream, Apply topically 2 (two) times a day, Disp: 45 g, Rfl: 1    dicyclomine (BENTYL) 10 mg capsule, Take 1 capsule every 6 hours as needed for abdominal cramping, Disp: 40 capsule, Rfl: 0    fluticasone (FLONASE) 50 mcg/act nasal spray, 2 sprays into each nostril daily, Disp: 15.8 mL, Rfl: 3

## 2025-06-03 ENCOUNTER — TELEPHONE (OUTPATIENT)
Dept: OBSTETRICS AND GYNECOLOGY | Facility: CLINIC | Age: 70
End: 2025-06-03
Payer: MEDICARE

## 2025-06-11 ENCOUNTER — HOSPITAL ENCOUNTER (OUTPATIENT)
Dept: PREADMISSION TESTING | Facility: HOSPITAL | Age: 70
Discharge: HOME | End: 2025-06-11
Attending: GENERAL PRACTICE
Payer: MEDICARE

## 2025-06-11 DIAGNOSIS — Z01.818 PREOP TESTING: Primary | ICD-10-CM

## 2025-06-11 LAB
ANION GAP (SMH): 8 MMOL/L (ref 8–16)
BUN SERPL-MCNC: 13 MG/DL (ref 8–23)
CALCIUM SERPL-MCNC: 9.3 MG/DL (ref 8.7–10.5)
CHLORIDE SERPL-SCNC: 104 MMOL/L (ref 95–110)
CO2 SERPL-SCNC: 26 MMOL/L (ref 23–29)
CREAT SERPL-MCNC: 0.9 MG/DL (ref 0.5–1.4)
GFR SERPLBLD CREATININE-BSD FMLA CKD-EPI: >60 ML/MIN/1.73/M2
GLUCOSE SERPL-MCNC: 102 MG/DL (ref 70–110)
HCT VFR BLD AUTO: 36.4 % (ref 37–48.5)
HGB BLD-MCNC: 11.9 GM/DL (ref 12–16)
POTASSIUM SERPL-SCNC: 4.2 MMOL/L (ref 3.5–5.1)
SODIUM SERPL-SCNC: 138 MMOL/L (ref 136–145)

## 2025-06-11 PROCEDURE — 36415 COLL VENOUS BLD VENIPUNCTURE: CPT | Performed by: ANESTHESIOLOGY

## 2025-06-11 PROCEDURE — 85018 HEMOGLOBIN: CPT | Performed by: ANESTHESIOLOGY

## 2025-06-11 PROCEDURE — 85014 HEMATOCRIT: CPT | Performed by: ANESTHESIOLOGY

## 2025-06-11 PROCEDURE — 82435 ASSAY OF BLOOD CHLORIDE: CPT | Performed by: ANESTHESIOLOGY

## 2025-06-11 NOTE — DISCHARGE INSTRUCTIONS
To confirm, Your doctor has instructed you that surgery is scheduled for: 6/17, TUESDAY    Please report to [facility], Registration the morning of surgery. You must check-in and receive a wristband before going to your procedure.  [address] [place] [address] [place] [address]    Pre-Op will call the afternoon prior to surgery between 1:00 and 6:00 PM with the final arrival time.  Phone number: [phone]    PLEASE NOTE:  The surgery schedule has many variables which may affect the time of your surgery case.  Family members should be available if your surgery time changes.  Plan to be here the day of your procedure between 4-6 hours.    MEDICATIONS:  TAKE ONLY THESE MEDICATIONS WITH A SMALL SIP OF WATER THE MORNING OF YOUR PROCEDURE:    SEE MED LIST      DO NOT TAKE THESE MEDICATIONS 5-7 DAYS PRIOR to your procedure or per your surgeon's request:   ASPIRIN, ALEVE, ADVIL, IBUPROFEN, FISH OIL VITAMIN E, HERBALS  (May take Tylenol)    ONLY if you are prescribed any types of blood thinners such as:  Aspirin, Coumadin, Plavix, Pradaxa, Xarelto, Aggrenox, Effient, Eliquis, Savasya, Brilinta, or any other, ask your surgeon whether you should stop taking them and how long before surgery you should stop.  You may also need to verify with the prescribing physician if it is ok to stop your medication.      INSTRUCTIONS IMPORTANT!!  Do not eat or drink anything between midnight and the time of your procedure- this includes gum, mints, and candy.  EXCEPT: you may have clear liquids such as:  WATER, BLACK COFFEE, UNSWEET TEA, OR GATORADE (NO RED OR PURPLE) UP TO 2 HOURS PRIOR TO YOUR ARRIVAL TIME.  Do not smoke or drink alcoholic beverages 24 hours prior to your procedure.  Shower the night before AND the morning of your procedure with a Chlorhexidine wash such as Hibiclens or Dial antibacterial soap from the neck down.  Do not get it on your face or in your eyes.  You may use your own shampoo and face wash. This  helps your skin to be as bacteria free as possible.    If you wear contact lenses, dentures, hearing aids or glasses, bring a container to put them in during surgery and give to a family member for safe keeping.  Please leave all jewelry, piercing's and valuables at home. You must remove your false eyelashes prior to surgery.    DO NOT remove hair from the surgery site.  Do not shave the incision site unless you are given specific instructions to do so.    ONLY if you have been diagnosed with sleep apnea please bring your C-PAP machine.  ONLY if you wear home oxygen please bring your portable oxygen tank the day of your procedure.  ONLY if you have a history of OPEN HEART SURGERY you will need a clearance from your Cardiologist per Anesthesia.      ONLY for patients requiring bowel prep, written instructions will be given by your doctor's office.  ONLY if you have any type of stimulator implant or any type of implanted device with a remote control.  Please bring the controller with you the morning of surgery  If your doctor has scheduled you for an overnight stay, bring a small overnight bag with any personal items you need.  Make arrangements in advance for transportation home by a responsible adult. You can not go home in an uber or a cab per hospital policy.  It is not safe to drive a vehicle during the 24 hours after anesthesia.          All  facilities and properties are tobacco free.  Smoking is NOT allowed.   If you have any questions about these instructions, call Pre-Op Admit  Nursing at [phone] or the Pre-Op Day Surgery Unit at [phone].

## 2025-06-16 ENCOUNTER — ANESTHESIA EVENT (OUTPATIENT)
Dept: SURGERY | Facility: HOSPITAL | Age: 70
End: 2025-06-16
Payer: MEDICARE

## 2025-06-17 ENCOUNTER — HOSPITAL ENCOUNTER (OUTPATIENT)
Facility: HOSPITAL | Age: 70
Discharge: HOME | End: 2025-06-17
Attending: GENERAL PRACTICE | Admitting: GENERAL PRACTICE
Payer: MEDICARE

## 2025-06-17 ENCOUNTER — ANESTHESIA (OUTPATIENT)
Dept: SURGERY | Facility: HOSPITAL | Age: 70
End: 2025-06-17
Payer: MEDICARE

## 2025-06-17 DIAGNOSIS — N87.0 DYSPLASIA OF CERVIX, LOW GRADE (CIN 1): ICD-10-CM

## 2025-06-17 PROCEDURE — 88305 TISSUE EXAM BY PATHOLOGIST: CPT | Mod: TC | Performed by: PATHOLOGY

## 2025-06-17 PROCEDURE — 57522 CONIZATION OF CERVIX: CPT | Mod: ,,, | Performed by: GENERAL PRACTICE

## 2025-06-17 PROCEDURE — 36000707: Performed by: GENERAL PRACTICE

## 2025-06-17 PROCEDURE — 63600175 PHARM REV CODE 636 W HCPCS: Performed by: NURSE ANESTHETIST, CERTIFIED REGISTERED

## 2025-06-17 PROCEDURE — 71000033 HC RECOVERY, INTIAL HOUR: Performed by: GENERAL PRACTICE

## 2025-06-17 PROCEDURE — 27200651 HC AIRWAY, LMA: Performed by: NURSE ANESTHETIST, CERTIFIED REGISTERED

## 2025-06-17 PROCEDURE — 25000003 PHARM REV CODE 250: Performed by: ANESTHESIOLOGY

## 2025-06-17 PROCEDURE — 36000706: Performed by: GENERAL PRACTICE

## 2025-06-17 PROCEDURE — 25000003 PHARM REV CODE 250: Performed by: GENERAL PRACTICE

## 2025-06-17 PROCEDURE — 37000008 HC ANESTHESIA 1ST 15 MINUTES: Performed by: GENERAL PRACTICE

## 2025-06-17 PROCEDURE — 37000009 HC ANESTHESIA EA ADD 15 MINS: Performed by: GENERAL PRACTICE

## 2025-06-17 PROCEDURE — 25000003 PHARM REV CODE 250: Performed by: NURSE ANESTHETIST, CERTIFIED REGISTERED

## 2025-06-17 PROCEDURE — 63600175 PHARM REV CODE 636 W HCPCS: Mod: TB,JZ | Performed by: NURSE ANESTHETIST, CERTIFIED REGISTERED

## 2025-06-17 PROCEDURE — 63600175 PHARM REV CODE 636 W HCPCS: Performed by: GENERAL PRACTICE

## 2025-06-17 PROCEDURE — 71000015 HC POSTOP RECOV 1ST HR: Performed by: GENERAL PRACTICE

## 2025-06-17 PROCEDURE — 94799 UNLISTED PULMONARY SVC/PX: CPT

## 2025-06-17 RX ORDER — PROMETHAZINE HYDROCHLORIDE 25 MG/ML
INJECTION, SOLUTION INTRAMUSCULAR; INTRAVENOUS
Status: DISCONTINUED | OUTPATIENT
Start: 2025-06-17 | End: 2025-06-17

## 2025-06-17 RX ORDER — EPHEDRINE SULFATE 50 MG/ML
INJECTION, SOLUTION INTRAVENOUS
Status: DISCONTINUED | OUTPATIENT
Start: 2025-06-17 | End: 2025-06-17

## 2025-06-17 RX ORDER — ACETAMINOPHEN 10 MG/ML
INJECTION, SOLUTION INTRAVENOUS
Status: DISCONTINUED | OUTPATIENT
Start: 2025-06-17 | End: 2025-06-17

## 2025-06-17 RX ORDER — GLUCAGON 1 MG
1 KIT INJECTION
Status: DISCONTINUED | OUTPATIENT
Start: 2025-06-17 | End: 2025-06-17 | Stop reason: HOSPADM

## 2025-06-17 RX ORDER — SODIUM CHLORIDE, SODIUM LACTATE, POTASSIUM CHLORIDE, CALCIUM CHLORIDE 600; 310; 30; 20 MG/100ML; MG/100ML; MG/100ML; MG/100ML
INJECTION, SOLUTION INTRAVENOUS CONTINUOUS
Status: DISCONTINUED | OUTPATIENT
Start: 2025-06-17 | End: 2025-06-17 | Stop reason: HOSPADM

## 2025-06-17 RX ORDER — ONDANSETRON HYDROCHLORIDE 2 MG/ML
4 INJECTION, SOLUTION INTRAVENOUS ONCE
Status: DISCONTINUED | OUTPATIENT
Start: 2025-06-17 | End: 2025-06-17 | Stop reason: HOSPADM

## 2025-06-17 RX ORDER — ONDANSETRON HYDROCHLORIDE 2 MG/ML
INJECTION, SOLUTION INTRAVENOUS
Status: DISCONTINUED | OUTPATIENT
Start: 2025-06-17 | End: 2025-06-17

## 2025-06-17 RX ORDER — LIDOCAINE HYDROCHLORIDE 10 MG/ML
0.5 INJECTION, SOLUTION EPIDURAL; INFILTRATION; INTRACAUDAL; PERINEURAL ONCE
Status: DISCONTINUED | OUTPATIENT
Start: 2025-06-17 | End: 2025-06-17 | Stop reason: HOSPADM

## 2025-06-17 RX ORDER — KETOROLAC TROMETHAMINE 30 MG/ML
INJECTION, SOLUTION INTRAMUSCULAR; INTRAVENOUS
Status: DISCONTINUED | OUTPATIENT
Start: 2025-06-17 | End: 2025-06-17

## 2025-06-17 RX ORDER — PROCHLORPERAZINE EDISYLATE 5 MG/ML
5 INJECTION INTRAMUSCULAR; INTRAVENOUS EVERY 4 HOURS PRN
Status: DISCONTINUED | OUTPATIENT
Start: 2025-06-17 | End: 2025-06-17 | Stop reason: HOSPADM

## 2025-06-17 RX ORDER — HYDROMORPHONE HYDROCHLORIDE 2 MG/ML
0.2 INJECTION, SOLUTION INTRAMUSCULAR; INTRAVENOUS; SUBCUTANEOUS EVERY 5 MIN PRN
Status: DISCONTINUED | OUTPATIENT
Start: 2025-06-17 | End: 2025-06-17 | Stop reason: HOSPADM

## 2025-06-17 RX ORDER — LIDOCAINE HYDROCHLORIDE 20 MG/ML
INJECTION INTRAVENOUS
Status: DISCONTINUED | OUTPATIENT
Start: 2025-06-17 | End: 2025-06-17

## 2025-06-17 RX ORDER — DIPHENHYDRAMINE HYDROCHLORIDE 50 MG/ML
25 INJECTION, SOLUTION INTRAMUSCULAR; INTRAVENOUS EVERY 6 HOURS PRN
Status: DISCONTINUED | OUTPATIENT
Start: 2025-06-17 | End: 2025-06-17 | Stop reason: HOSPADM

## 2025-06-17 RX ORDER — FENTANYL CITRATE 50 UG/ML
25 INJECTION, SOLUTION INTRAMUSCULAR; INTRAVENOUS EVERY 5 MIN PRN
Status: DISCONTINUED | OUTPATIENT
Start: 2025-06-17 | End: 2025-06-17 | Stop reason: HOSPADM

## 2025-06-17 RX ORDER — MIDAZOLAM HYDROCHLORIDE 1 MG/ML
INJECTION INTRAMUSCULAR; INTRAVENOUS
Status: DISCONTINUED | OUTPATIENT
Start: 2025-06-17 | End: 2025-06-17

## 2025-06-17 RX ORDER — SODIUM CHLORIDE 9 MG/ML
INJECTION, SOLUTION INTRAVENOUS CONTINUOUS
Status: DISCONTINUED | OUTPATIENT
Start: 2025-06-17 | End: 2025-06-17 | Stop reason: HOSPADM

## 2025-06-17 RX ORDER — DEXAMETHASONE SODIUM PHOSPHATE 4 MG/ML
INJECTION, SOLUTION INTRA-ARTICULAR; INTRALESIONAL; INTRAMUSCULAR; INTRAVENOUS; SOFT TISSUE
Status: DISCONTINUED | OUTPATIENT
Start: 2025-06-17 | End: 2025-06-17

## 2025-06-17 RX ORDER — OXYCODONE HYDROCHLORIDE 5 MG/1
5 TABLET ORAL
Status: DISCONTINUED | OUTPATIENT
Start: 2025-06-17 | End: 2025-06-17 | Stop reason: HOSPADM

## 2025-06-17 RX ORDER — PROPOFOL 10 MG/ML
VIAL (ML) INTRAVENOUS
Status: DISCONTINUED | OUTPATIENT
Start: 2025-06-17 | End: 2025-06-17

## 2025-06-17 RX ORDER — FENTANYL CITRATE 50 UG/ML
INJECTION, SOLUTION INTRAMUSCULAR; INTRAVENOUS
Status: DISCONTINUED | OUTPATIENT
Start: 2025-06-17 | End: 2025-06-17

## 2025-06-17 RX ADMIN — PROPOFOL 150 MG: 10 INJECTION, EMULSION INTRAVENOUS at 09:06

## 2025-06-17 RX ADMIN — MIDAZOLAM HYDROCHLORIDE 2 MG: 1 INJECTION INTRAMUSCULAR; INTRAVENOUS at 08:06

## 2025-06-17 RX ADMIN — FENTANYL CITRATE 50 MCG: 50 INJECTION, SOLUTION INTRAMUSCULAR; INTRAVENOUS at 09:06

## 2025-06-17 RX ADMIN — EPHEDRINE SULFATE 10 MG: 50 INJECTION, SOLUTION INTRAMUSCULAR; INTRAVENOUS; SUBCUTANEOUS at 09:06

## 2025-06-17 RX ADMIN — PROMETHAZINE HYDROCHLORIDE 6.25 MG: 25 INJECTION INTRAMUSCULAR; INTRAVENOUS at 09:06

## 2025-06-17 RX ADMIN — LIDOCAINE HYDROCHLORIDE 100 MG: 20 INJECTION, SOLUTION INTRAVENOUS at 09:06

## 2025-06-17 RX ADMIN — FERRIC SUBSULFATE 8 ML: 259 SOLUTION TOPICAL at 09:06

## 2025-06-17 RX ADMIN — KETOROLAC TROMETHAMINE 15 MG: 30 INJECTION, SOLUTION INTRAMUSCULAR; INTRAVENOUS at 09:06

## 2025-06-17 RX ADMIN — FENTANYL CITRATE 25 MCG: 50 INJECTION, SOLUTION INTRAMUSCULAR; INTRAVENOUS at 09:06

## 2025-06-17 RX ADMIN — IODINE SOLUTION STRONG 5% (LUGOL'S) 10 ML: 5 SOLUTION at 09:06

## 2025-06-17 RX ADMIN — ONDANSETRON 8 MG: 2 INJECTION INTRAMUSCULAR; INTRAVENOUS at 09:06

## 2025-06-17 RX ADMIN — GLYCOPYRROLATE 0.2 MG: 0.2 INJECTION, SOLUTION INTRAMUSCULAR; INTRAVITREAL at 09:06

## 2025-06-17 RX ADMIN — DEXAMETHASONE SODIUM PHOSPHATE 4 MG: 4 INJECTION, SOLUTION INTRA-ARTICULAR; INTRALESIONAL; INTRAMUSCULAR; INTRAVENOUS; SOFT TISSUE at 09:06

## 2025-06-17 RX ADMIN — SODIUM CHLORIDE, SODIUM GLUCONATE, SODIUM ACETATE, POTASSIUM CHLORIDE AND MAGNESIUM CHLORIDE: 526; 502; 368; 37; 30 INJECTION, SOLUTION INTRAVENOUS at 08:06

## 2025-06-17 RX ADMIN — ACETAMINOPHEN 1000 MG: 10 INJECTION INTRAVENOUS at 09:06

## 2025-06-17 NOTE — H&P
DAY OF SURGERY NOTE / UPDATE      Bailey Joseph is a 70 y.o.  who is here today for surgery.  She has no last-minute questions or concerns.  There have been no significant changes to her medical history or condition.  She has persistent JANN 1 with precedent ASC-H PAP and inadequate colposcopy, and desires further evaluation and management with a LEEP and ECC.   Her sister will be her ride home from the hospital today.    Lab Results   Component Value Date    WBC 4.08 2025    HGB 11.9 (L) 2025    HCT 36.4 (L) 2025     2025    MCV 92 2025      Lab Results   Component Value Date     2025    K 4.2 2025     2025    CO2 26 2025    BUN 13 2025    CREATININE 0.9 2025     2025    AST 25 2025    ALT 26 2025    BILITOT 0.3 2025         Vitals:    25 0742   BP: 136/63   Pulse:    Resp:    Temp:         GEN = alert/oriented, pleasant     To OR when team ready for surgery  No antibiotics indicated     MD SUNIL    -----------------------------      Background   10/02/2024  Bailey Joseph is a 69 y.o. here for WWE.     10/30/2024   Bailey Joseph is a 69 y.o. here for colposcopy.  Hot flashes are better off the Wellbutrin.  Completed the course of flagyl.  Hasn't been using the boric acid.     2024  69 y.o. with JANN 1 per recent colposcopy, preceded by ASC-H in setting of at least moderate vulvovaginal atrophy.  Discussed options of excisional biopsy (LEEP) now versus watchful waiting. --Rx: vaginal estrogen    2025  69 y.o. s/p colposcopy today.  Atrophy definitely improved with use of vaginal estrogen.       ASSESSMENT AND PLAN   2025  69 y.o.  with persistent JANN 1, precedent ASC-H PAP, and inadequate colposcopy.  Discussed continued outpatient surveillance versus excisional biopsy, and she'd like to proceed with LEEP.    Planned surgery: LEEP with ECC    Informed consent obtained  for surgery.  The patient wishes to proceed with the above listed procedure(s).  She unserstands risks of surgery include bleeding, need for blood transfusion, infection, pain, scarring, damage to nearby organs or tissues, need for other surgery, inadequate diagnosis, inadequate treatment of her symptoms, stroke, heart attack, blood clots such as DVT or PE, complications from anesthesia, allergic reactions to medications or devices, and death.    OR date: 2025    Anesthesia plan: choice    Antibiotic prophylaxis: not indicated    Medication management: morning meds with a sip of water unless otherwise directed by hospital/anesthesia staff    Per nursing staff:  Postoperative expectations reviewed.  Pelvic rest, lifting and activity restrictions, driving restrictions, and medications.    Sabi Arguello MD   [facility]    SUBJECTIVE   2025  Bailey Joseph is a 69 y.o. here for discussion about management options after recent PAP / HPV and colposcopy.  Full medical history reviewed and updated today (below).     DYSPLASIA HISTORY:  2017:  PAP NEG / HPV POS (-16/-18) <-- first-ever abnormal  10/14/2024: ASC-H / HPV POS (-16/-18)  10/30/2024: JANN 1 @ 12, JANN 1 @ ECC  2025: LSIL / HPV POS (-16/-18)   2025: JANN 1 @ 3, ECC = insufficient     G'sP's:   LMP: age 52  Relationship: not sexually active and boyfriend x 23 years  PAP Hx: see Dysplasia Hx below  MMG (2025) = negative  HRT: never used   BLEEDING: denies     OBJECTIVE   PHYSICAL EXAM  Vitals:    25 1137   BP: 112/62     GEN = alert/oriented, nad, pleasant  HEENT = sclera anicteric, EOM grossly normal  CV = BP and HR as per vitals  PULM = normal respiratory effort   = deferred, no acute concerns    LABS AND RADS    Lab Results   Component Value Date    WBC 4.08 2025    HGB 12.1 2025    HCT 37.4 2025     2025    MCV 92 2025      -----------------------  2025     GYNECOLOGIC  HISTORY  Genital HSV: no  STD Hx: no past history  Menarche: 17 yoa  Non-menstrual pelvic pressure/pain: No  Dyspareunia: N/A  Vasomotor Sxs: yes, typically 2 per day and yes, typically 2 per night  Vaginal dryness: denies    OBSTETRIC HISTORY  Living children: 2  Vaginal deliveries: 2     PAST MEDICAL HISTORY  Past Medical History:   Diagnosis Date    Anemia     BMI 30.0-30.9,adult     Mild intermittent asthma, uncomplicated     Prediabetes        PAST SURGICAL HISTORY  Past Surgical History:   Procedure Laterality Date    BREAST BIOPSY Left     COLONOSCOPY N/A 05/10/2023    Procedure: COLONOSCOPY;  Surgeon: Angelica Washburn MD;  Location: [facility] ENDO;  Service: Endoscopy;  Laterality: N/A;    LAPAROSCOPIC CHOLECYSTECTOMY N/A 01/16/2023    Procedure: CHOLECYSTECTOMY, LAPAROSCOPIC;  Surgeon: Jean-Claude Salazar III, MD;  Location: [facility] OR;  Service: General;  Laterality: N/A;    LIPOMA RESECTION      TONSILLECTOMY        ALLERGIES  Review of patient's allergies indicates:  No Known Allergies      MEDICATIONS  Current Outpatient Medications   Medication Instructions    estradioL (VAGIFEM) 10 mcg, Vaginal, Twice weekly    ferrous sulfate, dried (SLOW FE) 160 mg, Daily       SOCIAL HISTORY  Lives with: BF  Smoker: non-smoker  Alcohol: yes, rare  Drugs: denies  Domestic Violence: no  Occupation: homemaker     FAMILY HISTORY  BLEEDING or  CLOTTING DISORDERS: none  BREAST CA:  niece  UTERINE CA: none  OVARIAN CA: none  COLON CA: none

## 2025-06-17 NOTE — PLAN OF CARE
Patient awake, alert, oriented. Vital signs stable. Patient verbalizes readiness for discharge. Discharge instructions reviewed with patient. Patient verbalized understanding. IV removed, catheter intact. All belongings returned to patient. Patient transferred to sister's car via wheelchair per zhou Molina. Safety maintained.

## 2025-06-17 NOTE — PROGRESS NOTES
Pt prepared for surgery. Pt resting in bed, with sister at bedside. Daughter signed up for text messaging. Belongings kept by sister Fall risk armband on  SCDs on.

## 2025-06-17 NOTE — TRANSFER OF CARE
"Anesthesia Transfer of Care Note    Patient: Bailey Joseph    Procedure(s) Performed: Procedure(s) (LRB):  LEEP (LOOP ELECTROSURGICAL EXCISION PROCEDURE) (N/A)  CURETTAGE, ENDOCERVICAL (N/A)    Patient location: PACU    Anesthesia Type: general    Transport from OR: Transported from OR on 2-3 L/min O2 by NC with adequate spontaneous ventilation    Post pain: adequate analgesia    Post assessment: no apparent anesthetic complications    Post vital signs: stable    Level of consciousness: sedated    Nausea/Vomiting: no nausea/vomiting    Complications: none    Transfer of care protocol was followed      Last vitals: Visit Vitals  BP (!) 116/55   Pulse 70   Temp 36.9 °C (98.4 °F) (Skin)   Resp 11   Ht 5' 8" (1.727 m)   Wt 91.2 kg (201 lb)   SpO2 100%   Breastfeeding No   BMI 30.56 kg/m²     "

## 2025-06-17 NOTE — OP NOTE
OPERATIVE NOTE    SURGERY DATE =  06/17/2025     PREOP DX =  persistent JANN 1 with precedent ASC-H PAP and inadequate colposcopy     POSTOP DX =  same    PROCEDURE =  LEEP with ECC    FINDINGS = normal-appearing anatomy    SPECIMENS =   LEEP  ECC    SURGEON = Sabi Arguello MD    ASSISTANT = none    ANESTHESIA = general with LMA    COMPLICATIONS = none    EBL = 5cc    FLUIDS = 800cc crystalloid    UOP = 30cc    DRAINS = none    PROCEDURE IN DETAIL:  After informed consent was obtained, the patient was taken to the operating room where anesthesia (general with LMA) was administered and found to be adequate.  A surgical time-out was performed, ensuring the proper patient, procedure, and site.  The vagina was prepped with chlorhexidine, and the patient was draped in the high lithotomy position using Patrice stirrups.  In and out catheterization of the bladder was performed.    An insulated Graves speculum was placed in the patient's vagina.  The cervix was infiltrated in 4 quadrants at 3, 6, 9, and 12 o'clock with a total of 10cc 1% lidocaine with 1:100,000 epinephrine.  Lugol's solution was applied to the cervix, and there were no non-staining areas.  The TZ was not seen.  A single pass with a 1.5 cm exoloop was made to obtain the specimen with the generator setting at 40 bourne blend.  Next an Endometrial Curettage was collected.  The ball electrode was used to cauterize the cervical bed with good hemostatic effect.  Monsel's solution was also applied to the cervical bed for hemostasis.    All instruments were removed from the vagina.  The patient's anesthesia was reversed uneventfully, and she was taken to PACU in stable condition for recovery.     MD SUNIL

## 2025-06-17 NOTE — ANESTHESIA PROCEDURE NOTES
Intubation    Date/Time: 6/17/2025 9:04 AM    Performed by: Annie Anne CRNA  Authorized by: Annie Anne CRNA    Intubation:     Induction:  Intravenous    Intubated:  Postinduction    Mask Ventilation:  Not attempted    Attempts:  1    Attempted By:  CRNA    Difficult Airway Encountered?: No      Complications:  None    Airway Device:  Supraglottic airway/LMA    Airway Device Size:  4.0    Style/Cuff Inflation:  Cuffed (inflated to minimal occlusive pressure)    Secured at:  The lips    Placement Verified By:  Capnometry    Complicating Factors:  None    Findings Post-Intubation:  Atraumatic/condition of teeth unchanged and BS equal bilateral

## 2025-06-17 NOTE — DISCHARGE INSTRUCTIONS
DISCHARGE MEDICATIONS:  Ibuprofen 800mg by mouth every 8 hours as needed  for mild to moderate pain  take with food to minimize stomach upset     Senna-docusate 8.6-50, 1 tablet by mouth daily as needed for constipation     Norco 5/325 1 tablet by mouth every 4 hours as needed  for moderate to severe pain  do not drive or make important decisions while taking  likely to cause constipation, consider increasing your water and fiber intake     Continue your other home medications (if any) as previously prescribed.     PATIENT INSTRUCTIONS:  Take it easy and rest for a few days.  Return to most normal activity and work as tolerated.  It's safe to use stairs, but depending on the extent of your surgery you might want to limit use to 1-2 times per day at first.  Eat a normal diet.  Drink plenty of water.     Pelvic rest (no sex, no tampons, nothing in the vagina) for 4 weeks or as long as you have vaginal bleeding, whichever is longer.     No bathing, swimming, or soaking (showering is encouraged) for 2 weeks or as long as you have vaginal bleeding, whichever is longer.     No strenuous activity, heavy housework, or lifting greater than 10 pounds for 2 weeks after the procedure.     Vaginal bleeding is normal for several weeks after this procedure.  The amount should be the same as or less than a normal period.  You also might notice a yellow-brown-black discharge from some of the solutions / medicines that were used during surgery.     No driving, operating heavy machinery, or making important decisions  for 24 hours after the procedure,  if you are taking narcotic pain pills (Percocet, Norco, etc.)  if your pain control doesn't allow it (Can you quickly slam on the brakes and twist to check your blind spot?)     Call or return for:  pain not controlled by your medications,  heavy bleeding,  fever (100.4 or higher),  foul-smelling vaginal discharge  24/7 [facility] Advice Line: [phone]     FOLLOW-UP:  "  Follow-up with Dr. Arguello as scheduled on 7/29/2025.          Discharge Instructions: After Your Surgery/Procedure  Youve just had surgery. During surgery you were given medicine called anesthesia to keep you relaxed and free of pain. After surgery you may have some pain or nausea. This is common. Here are some tips for feeling better and getting well after surgery.     Stay on schedule with your medication.   Going home  Your doctor or nurse will show you how to take care of yourself when you go home. He or she will also answer your questions. Have an adult family member or friend drive you home.      For your safety we recommend these precaution for the first 24 hours after your procedure:  Do not drive or use heavy equipment.  Do not make important decisions or sign legal papers.  Do not drink alcohol.  Have someone stay with you, if needed. He or she can watch for problems and help keep you safe.  Your concentration, balance, coordination, and judgement may be impaired for many hours after anesthesia.  Use caution when ambulating or standing up.     You may feel weak and "washed out" after anesthesia and surgery.      Subtle residual effects of general anesthesia or sedation with regional / local anesthesia can last more than 24 hours.  Rest for the remainder of the day or longer if your Doctor/Surgeon has advised you to do so.  Although you may feel normal within the first 24 hours, your reflexes and mental ability may be impaired without you realizing it.  You may feel dizzy, lightheaded or sleepy for 24 hours or longer.      Be sure to go to all follow-up visits with your doctor. And rest after your surgery for as long as your doctor tells you to.  Coping with pain  If you have pain after surgery, pain medicine will help you feel better. Take it as told, before pain becomes severe. Also, ask your doctor or pharmacist about other ways to control pain. This might be with heat, ice, or relaxation. And follow " any other instructions your surgeon or nurse gives you.  Tips for taking pain medicine  To get the best relief possible, remember these points:  Pain medicines can upset your stomach. Taking them with a little food may help.  Most pain relievers taken by mouth need at least 20 to 30 minutes to start to work.  Taking medicine on a schedule can help you remember to take it. Try to time your medicine so that you can take it before starting an activity. This might be before you get dressed, go for a walk, or sit down for dinner.  Constipation is a common side effect of pain medicines. Call your doctor before taking any medicines such as laxatives or stool softeners to help ease constipation. Also ask if you should skip any foods. Drinking lots of fluids and eating foods such as fruits and vegetables that are high in fiber can also help. Remember, do not take laxatives unless your surgeon has prescribed them.  Drinking alcohol and taking pain medicine can cause dizziness and slow your breathing. It can even be deadly. Do not drink alcohol while taking pain medicine.  Pain medicine can make you react more slowly to things. Do not drive or run machinery while taking pain medicine.  Your health care provider may tell you to take acetaminophen to help ease your pain. Ask him or her how much you are supposed to take each day. Acetaminophen or other pain relievers may interact with your prescription medicines or other over-the-counter (OTC) drugs. Some prescription medicines have acetaminophen and other ingredients. Using both prescription and OTC acetaminophen for pain can cause you to overdose. Read the labels on your OTC medicines with care. This will help you to clearly know the list of ingredients, how much to take, and any warnings. It may also help you not take too much acetaminophen. If you have questions or do not understand the information, ask your pharmacist or health care provider to explain it to you before you  take the OTC medicine.  Managing nausea  Some people have an upset stomach after surgery. This is often because of anesthesia, pain, or pain medicine, or the stress of surgery. These tips will help you handle nausea and eat healthy foods as you get better. If you were on a special food plan before surgery, ask your doctor if you should follow it while you get better. These tips may help:  Do not push yourself to eat. Your body will tell you when to eat and how much.  Start off with clear liquids and soup. They are easier to digest.  Next try semi-solid foods, such as mashed potatoes, applesauce, and gelatin, as you feel ready.  Slowly move to solid foods. Dont eat fatty, rich, or spicy foods at first.  Do not force yourself to have 3 large meals a day. Instead eat smaller amounts more often.  Take pain medicines with a small amount of solid food, such as crackers or toast, to avoid nausea.     Call your surgeon if  You still have pain an hour after taking medicine. The medicine may not be strong enough.  You feel too sleepy, dizzy, or groggy. The medicine may be too strong.  You have side effects like nausea, vomiting, or skin changes, such as rash, itching, or hives.       If you have obstructive sleep apnea  You were given anesthesia medicine during surgery to keep you comfortable and free of pain. After surgery, you may have more apnea spells because of this medicine and other medicines you were given. The spells may last longer than usual.   At home:  Keep using the continuous positive airway pressure (CPAP) device when you sleep. Unless your health care provider tells you not to, use it when you sleep, day or night. CPAP is a common device used to treat obstructive sleep apnea.  Talk with your provider before taking any pain medicine, muscle relaxants, or sedatives. Your provider will tell you about the possible dangers of taking these medicines.  © 3290-9682 [employer]. [address] [place] [address],  [place] [address]. All rights reserved. This information is not intended as a substitute for professional medical care. Always follow your healthcare professional's instructions.          Using an Incentive Spirometer    An incentive spirometer is a device that helps you do deep breathing exercises. These exercises expand your lungs, aid in circulation, and help prevent pneumonia. Deep breathing exercises also help you breathe better and improve the function of your lungs by:  Keeping your lungs clear  Strengthening your breathing muscles  Helping prevent respiratory complications or problems  The incentive spirometer gives you a way to take an active part in recover. A nurse or therapist will teach you breathing exercises. To do these exercises, you will breathe in through your mouth and not your nose. The incentive spirometer only works correctly if you breathe in through your mouth.  Steps to clear lungs  Step 1. Exhale normally. Then, inhale normally.  Relax and breathe out.  Step 2. Place your lips tightly around the mouthpiece.  Make sure the device is upright and not tilted.  Step 3. Inhale as much air as you can through the mouthpiece (don't breath through your nose).  Inhale slowly and deeply.  Hold your breath long enough to keep the balls or disk raised for at least 3 to 5 seconds, or as instructed by your healthcare provider.  Some spirometers have an indicator to let you know that you are breathing in too fast. If the indicator goes off, breathe in more slowly.  Step 4. Repeat the exercise regularly.  Do this exercise every hour while you're awake, or as instructed by your healthcare provider.  If you were taught deep breathing and coughing exercises, do them regularly as instructed by your healthcare provider.           Post op instructions for prevention of DVT  What is deep vein thrombosis?  Deep vein thrombosis (DVT) is the medical term for blood clots in the deep veins of the leg.  These blood  clots can be dangerous.  A DVT can block a blood vessel and keep blood from getting where it needs to go.  Another problem is that the clot can travel to other parts of the body such as the lungs.  A clot that travels to the lungs is called a pulmonary embolus (PE) and can cause serious problems with breathing which can lead to death.  Am I at risk for DVT/PE?  If you are not very active, you are at risk of DVT.  Anyone confined to bed, sitting for long periods of time, recovering from surgery, etc. increases the risk of DVT.  Other risk factors are cancer diagnosis, certain medications, estrogen replacement in any form,older age, obesity, pregnancy, smoking, history of clotting disorders, and dehydration.  How will I know if I have a DVT?  Swelling in the lower leg  Pain  Warmth, redness, hardness or bulging of the vein  If you have any of these symptoms, call your doctors office right away.  Some people will not have any symptoms until the clot moves to the lungs.  What are the symptoms of a PE?  Panting, shortness of breath, or trouble breathing  Sharp, knife-like chest pain when you breathe  Coughing or coughing up blood  Rapid heartbeat  If you have any of these symptoms or get worse quickly, call 911 for emergency treatment.  How can I prevent a DVT?  Avoid long periods of inactivity and dont cross your legs--get up and walk around every hour or so.  Stay active--walking after surgery is highly encouraged.  This means you should get out of the house and walk in the neighborhood.  Going up and down stairs will not impair healing (unless advised against such activity by your doctor).    Drink plenty of noncaffeinated, nonalcoholic fluids each day to prevent dehydration.  Wear special support stockings, if they have been advised by your doctor.  If you travel, stop at least once an hour and walk around.  Avoid smoking (assistance with stopping is available through your healthcare provider)  Always notify your  doctor if you are not able to follow the post operative instructions that are given to you at the time of discharge.  It may be necessary to prescribe one of the medications available to prevent DVT.          We hope your stay was comfortable as you heal now, mend and rest.    For we have enjoyed taking care of you by giving your our best.    And as you get better, by regaining your health and strength;   We count it as a privilege to have served you and hope your time at [facility] was well spent.      Thank  You!!!

## 2025-06-17 NOTE — DISCHARGE SUMMARY
GYNECOLOGY DISCHARGE SUMMARY    HOSPITAL COURSE:  Admission Date: 2025   Discharge Date: 2025    Bailey Joseph is a 70 y.o.  who underwent LEEP.  Her recovery from the surgery was unremarkable, and she meets criteria to go home.    PENDING PATHOLOGY:  LEEP  ECC    DISCHARGE MEDICATIONS:  Ibuprofen 800mg by mouth every 8 hours as needed  for mild to moderate pain  take with food to minimize stomach upset    Senna-docusate 8.6-50, 1 tablet by mouth daily as needed for constipation    Norco 5/325 1 tablet by mouth every 4 hours as needed  for moderate to severe pain  do not drive or make important decisions while taking  likely to cause constipation, consider increasing your water and fiber intake    Continue your other home medications (if any) as previously prescribed.    PATIENT INSTRUCTIONS:  Take it easy and rest for a few days.  Return to most normal activity and work as tolerated.  It's safe to use stairs, but depending on the extent of your surgery you might want to limit use to 1-2 times per day at first.  Eat a normal diet.  Drink plenty of water.    Pelvic rest (no sex, no tampons, nothing in the vagina) for 4 weeks or as long as you have vaginal bleeding, whichever is longer.    No bathing, swimming, or soaking (showering is encouraged) for 2 weeks or as long as you have vaginal bleeding, whichever is longer.    No strenuous activity, heavy housework, or lifting greater than 10 pounds for 2 weeks after the procedure.    Vaginal bleeding is normal for several weeks after this procedure.  The amount should be the same as or less than a normal period.  You also might notice a yellow-brown-black discharge from some of the solutions / medicines that were used during surgery.    No driving, operating heavy machinery, or making important decisions  for 24 hours after the procedure,  if you are taking narcotic pain pills (Percocet, Norco, etc.)  if your pain control doesn't allow it (Can you  quickly slam on the brakes and twist to check your blind spot?)    Call or return for:  pain not controlled by your medications,  heavy bleeding,  fever (100.4 or higher),  foul-smelling vaginal discharge  24/7 [facility] Nurse Advice Line: [phone]    FOLLOW-UP:   Follow-up with Dr. Arguello as scheduled on 7/29/2025.

## 2025-06-17 NOTE — ANESTHESIA PREPROCEDURE EVALUATION
06/17/2025  Bailey Joseph is a 70 y.o., female.      Pre-op Assessment    I have reviewed the Patient Summary Reports.    I have reviewed the NPO Status.   I have reviewed the Medications.     Review of Systems  Anesthesia Hx:  No problems with previous Anesthesia                Social:  Non-Smoker       Cardiovascular:  Cardiovascular Normal                                              Pulmonary:    Asthma    Sleep Apnea   Asthma:    Obstructive Sleep Apnea (BAO).           Renal/:  Renal/ Normal                 Hepatic/GI:  No Bowel Prep.                   Musculoskeletal:  Musculoskeletal Normal                Neurological:  Neurology Normal                                      Endocrine:        Obesity / BMI > 30  Dermatological:  Skin Normal    Psych:  Psychiatric Normal                    Physical Exam  General: Well nourished, Cooperative, Alert and Oriented    Airway:  Mallampati: II   Mouth Opening: Normal  TM Distance: Normal  Neck ROM: Normal ROM    Dental:  Intact        Anesthesia Plan  Type of Anesthesia, risks & benefits discussed:    Anesthesia Type: Gen Supraglottic Airway  Intra-op Monitoring Plan: Standard ASA Monitors  Post Op Pain Control Plan: multimodal analgesia  Induction:  IV  Informed Consent: Informed consent signed with the Patient and all parties understand the risks and agree with anesthesia plan.  All questions answered.   ASA Score: 2    Ready For Surgery From Anesthesia Perspective.     .

## 2025-06-17 NOTE — ANESTHESIA POSTPROCEDURE EVALUATION
Anesthesia Post Evaluation    Patient: Bailey Joseph    Procedure(s) Performed: Procedure(s) (LRB):  LEEP (LOOP ELECTROSURGICAL EXCISION PROCEDURE) (N/A)  CURETTAGE, ENDOCERVICAL (N/A)    Final Anesthesia Type: general      Patient location during evaluation: PACU  Patient participation: Yes- Able to Participate  Level of consciousness: awake  Post-procedure vital signs: reviewed and stable  Pain management: adequate  Airway patency: patent    PONV status at discharge: No PONV  Anesthetic complications: no      Cardiovascular status: blood pressure returned to baseline  Respiratory status: unassisted  Hydration status: euvolemic  Follow-up not needed.              Vitals Value Taken Time   /62 06/17/25 10:50   Temp 36.6 °C (97.8 °F) 06/17/25 10:20   Pulse 65 06/17/25 10:50   Resp 16 06/17/25 10:50   SpO2 97 % 06/17/25 10:50         Event Time   Out of Recovery 10:22:00         Pain/Lynne Score: Lynne Score: 10 (6/17/2025 10:10 AM)  Modified Lynne Score: 20 (6/17/2025 11:05 AM)

## 2025-06-18 ENCOUNTER — PATIENT MESSAGE (OUTPATIENT)
Dept: OBSTETRICS AND GYNECOLOGY | Facility: CLINIC | Age: 70
End: 2025-06-18
Payer: MEDICARE

## 2025-06-18 DIAGNOSIS — R30.0 DYSURIA: Primary | ICD-10-CM

## 2025-06-18 RX ORDER — NITROFURANTOIN 25; 75 MG/1; MG/1
100 CAPSULE ORAL 2 TIMES DAILY
Qty: 14 CAPSULE | Refills: 0 | Status: SHIPPED | OUTPATIENT
Start: 2025-06-18 | End: 2025-06-25

## 2025-06-18 NOTE — TELEPHONE ENCOUNTER
BOOKER completed yesterday. Please advise if you would like to order a urine culture for c/o burning with urination.

## 2025-06-19 ENCOUNTER — LAB VISIT (OUTPATIENT)
Dept: LAB | Facility: HOSPITAL | Age: 70
End: 2025-06-19
Attending: GENERAL PRACTICE
Payer: MEDICARE

## 2025-06-19 VITALS
TEMPERATURE: 98 F | HEIGHT: 68 IN | WEIGHT: 201 LBS | DIASTOLIC BLOOD PRESSURE: 62 MMHG | HEART RATE: 65 BPM | BODY MASS INDEX: 30.46 KG/M2 | RESPIRATION RATE: 16 BRPM | SYSTOLIC BLOOD PRESSURE: 132 MMHG | OXYGEN SATURATION: 97 %

## 2025-06-19 DIAGNOSIS — R30.0 DYSURIA: ICD-10-CM

## 2025-06-19 LAB
BACTERIA #/AREA URNS AUTO: ABNORMAL /HPF
BILIRUB UR QL STRIP.AUTO: NEGATIVE
CLARITY UR: ABNORMAL
COLOR UR AUTO: YELLOW
GLUCOSE UR QL STRIP: NEGATIVE
HGB UR QL STRIP: ABNORMAL
HYALINE CASTS UR QL AUTO: 1 /LPF (ref 0–1)
KETONES UR QL STRIP: NEGATIVE
LEUKOCYTE ESTERASE UR QL STRIP: ABNORMAL
MICROSCOPIC COMMENT: ABNORMAL
NITRITE UR QL STRIP: NEGATIVE
PH UR STRIP: 6 [PH]
PROT UR QL STRIP: NEGATIVE
RBC #/AREA URNS AUTO: 8 /HPF
SP GR UR STRIP: 1.01
SQUAMOUS #/AREA URNS AUTO: 3 /HPF
UROBILINOGEN UR STRIP-ACNC: NEGATIVE EU/DL
WBC #/AREA URNS AUTO: >100 /HPF

## 2025-06-19 PROCEDURE — 87186 SC STD MICRODIL/AGAR DIL: CPT

## 2025-06-19 PROCEDURE — 81003 URINALYSIS AUTO W/O SCOPE: CPT

## 2025-06-23 ENCOUNTER — RESULTS FOLLOW-UP (OUTPATIENT)
Dept: OBSTETRICS AND GYNECOLOGY | Facility: CLINIC | Age: 70
End: 2025-06-23

## 2025-06-23 ENCOUNTER — PATIENT MESSAGE (OUTPATIENT)
Dept: OBSTETRICS AND GYNECOLOGY | Facility: CLINIC | Age: 70
End: 2025-06-23
Payer: MEDICARE

## 2025-06-23 DIAGNOSIS — Z00.00 ENCOUNTER FOR MEDICARE ANNUAL WELLNESS EXAM: ICD-10-CM

## 2025-06-23 LAB — BACTERIA UR CULT: ABNORMAL

## 2025-06-25 ENCOUNTER — PATIENT MESSAGE (OUTPATIENT)
Dept: ADMINISTRATIVE | Facility: OTHER | Age: 70
End: 2025-06-25
Payer: MEDICARE

## 2025-07-29 ENCOUNTER — OFFICE VISIT (OUTPATIENT)
Dept: OBSTETRICS AND GYNECOLOGY | Facility: CLINIC | Age: 70
End: 2025-07-29
Payer: MEDICARE

## 2025-07-29 VITALS
WEIGHT: 205.25 LBS | DIASTOLIC BLOOD PRESSURE: 60 MMHG | SYSTOLIC BLOOD PRESSURE: 120 MMHG | HEIGHT: 68 IN | BODY MASS INDEX: 31.11 KG/M2

## 2025-07-29 DIAGNOSIS — N76.0 VULVOVAGINITIS: ICD-10-CM

## 2025-07-29 DIAGNOSIS — Z09 POSTOPERATIVE EXAMINATION: Primary | ICD-10-CM

## 2025-07-29 PROCEDURE — 99999 PR PBB SHADOW E&M-EST. PATIENT-LVL II: CPT | Mod: PBBFAC,,, | Performed by: GENERAL PRACTICE

## 2025-07-29 NOTE — PROGRESS NOTES
Background   10/02/2024  Bailey Joseph is a 69 y.o. here for WWE.     10/30/2024   Bailey Joseph is a 69 y.o. here for colposcopy.  Hot flashes are better off the Wellbutrin.  Completed the course of flagyl.  Hasn't been using the boric acid.     2024  69 y.o. with JANN 1 per recent colposcopy, preceded by ASC-H in setting of at least moderate vulvovaginal atrophy.  Discussed options of excisional biopsy (LEEP) now versus watchful waiting. --Rx: vaginal estrogen     2025  69 y.o. s/p colposcopy today.  Atrophy definitely improved with use of vaginal estrogen    2025  69 y.o.  with persistent JANN 1, precedent ASC-H PAP, and inadequate colposcopy.  Discussed continued outpatient surveillance versus excisional biopsy, and she'd like to proceed with LEEP.       ASSESSMENT AND PLAN     2025  Bailey Joseph is a 70 y.o. 6wks post-op, doing well.  Op note pasted below.    Pathology reviewed  Activity restrictions lifted  f/u results of vaginitis swab  Cervical Cancer screening: next cervical CA screen (PAP+HPV) due ONE YEAR  Mammogram: up to date  Return to clinic: for annual GYN check-up, sooner neeln    Sabi Arguello MD     SUBJECTIVE     2025    SURGERY DATE =  2025      PREOP DX =  persistent JANN 1 with precedent ASC-H PAP and inadequate colposcopy      PROCEDURE =  LEEP with ECC     FINDINGS = normal-appearing anatomy    2025  Patient reports:   Vaginal bleeding: none   Problems with incisions:  N/A   Resumed sex: no   Problems with bowel function: No   Problems with bladder function:  No   Requiring pain meds: No   Other concerns: An odor sometimes.     DYSPLASIA HISTORY:  2017:  PAP NEG / HPV POS (-16/-18) <-- first-ever abnormal  10/14/2024: ASC-H / HPV POS (-16/-18)  10/30/2024: JANN 1 @ 12, JANN 1 @ ECC  2025: LSIL / HPV POS (-16/-18)   2025: JANN 1 @ 3, ECC = insufficient  2025: LEEP = JANN 1 with neg margins, ECC = JANN I     G'sP's:   LMP: age  52  Relationship: not sexually active and boyfriend x 23 years  PAP Hx: see Dysplasia Hx section  MMG (5/8/2025) = negative  HRT: never used   BLEEDING: denies     OBJECTIVE     PHYSICAL EXAM  Vitals:    07/29/25 1101   BP: 120/60     GEN = alert/oriented, nad, pleasant  HEENT = sclera anicteric, EOM grossly normal  CV = BP and HR as per vitals  PULM = normal respiratory effort   =      External: nefg, no lesions     Vagina: no lesions, normal     Discharge: minimal, vaginitis swab obtained     Cervix: well-healed    [facility] 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 [employer]    Sabi Arguello MD      OPERATIVE NOTE     SURGERY DATE =  06/17/2025      PREOP DX =  persistent JANN 1 with precedent ASC-H PAP and inadequate colposcopy      POSTOP DX =  same     PROCEDURE =  LEEP with ECC     FINDINGS = normal-appearing anatomy     SPECIMENS =   LEEP  ECC     SURGEON = Sabi Arguello MD     ASSISTANT = none     ANESTHESIA = general with LMA     COMPLICATIONS = none     EBL = 5cc     FLUIDS = 800cc crystalloid     UOP = 30cc     DRAINS = none     PROCEDURE IN DETAIL:  After informed consent was obtained, the patient was taken to the operating room where anesthesia (general with LMA) was administered and found to be adequate.  A surgical time-out was performed, ensuring the proper patient, procedure, and site.  The vagina was prepped with chlorhexidine, and the patient was draped in the high lithotomy position using Patrice stirrups.  In and out catheterization of the bladder was performed.     An insulated Graves speculum was placed in the patient's vagina.  The cervix was infiltrated in 4 quadrants at 3, 6, 9, and 12 o'clock with a total of 10cc 1% lidocaine with 1:100,000 epinephrine.  Lugol's solution was applied to the cervix, and there were no non-staining areas.  The TZ was not seen.  A single pass with a 1.5 cm exoloop was made to obtain the specimen with the generator setting at 40 bourne blend.  Next an Endometrial Curettage  was collected.  The ball electrode was used to cauterize the cervical bed with good hemostatic effect.  Monsel's solution was also applied to the cervical bed for hemostasis.     All instruments were removed from the vagina.  The patient's anesthesia was reversed uneventfully, and she was taken to PACU in stable condition for recovery.      MD SUNIL

## 2025-07-31 ENCOUNTER — PATIENT MESSAGE (OUTPATIENT)
Dept: OBSTETRICS AND GYNECOLOGY | Facility: CLINIC | Age: 70
End: 2025-07-31
Payer: MEDICARE

## 2025-07-31 DIAGNOSIS — N95.2 VAGINAL ATROPHY: ICD-10-CM

## 2025-07-31 DIAGNOSIS — Z87.410 HISTORY OF CERVICAL DYSPLASIA: ICD-10-CM

## 2025-07-31 NOTE — TELEPHONE ENCOUNTER
Pls advise, your notes states its vag dryness improved. Pt s/p LEEP w/ ECC on 6/17/25. LOV 7.29.25

## 2025-08-05 RX ORDER — ESTRADIOL 10 UG/1
1 TABLET, FILM COATED VAGINAL
Qty: 24 TABLET | Refills: 3 | Status: SHIPPED | OUTPATIENT
Start: 2025-08-07 | End: 2026-08-07

## 2025-08-25 ENCOUNTER — OFFICE VISIT (OUTPATIENT)
Dept: FAMILY MEDICINE | Facility: CLINIC | Age: 70
End: 2025-08-25
Payer: MEDICARE

## 2025-08-25 ENCOUNTER — PATIENT MESSAGE (OUTPATIENT)
Dept: FAMILY MEDICINE | Facility: CLINIC | Age: 70
End: 2025-08-25

## 2025-08-25 VITALS
HEIGHT: 68 IN | DIASTOLIC BLOOD PRESSURE: 74 MMHG | BODY MASS INDEX: 30.8 KG/M2 | WEIGHT: 203.25 LBS | SYSTOLIC BLOOD PRESSURE: 110 MMHG | OXYGEN SATURATION: 99 % | HEART RATE: 61 BPM

## 2025-08-25 DIAGNOSIS — G89.29 CHRONIC PAIN OF RIGHT KNEE: ICD-10-CM

## 2025-08-25 DIAGNOSIS — R61 CRANIOFACIAL HYPERHIDROSIS: ICD-10-CM

## 2025-08-25 DIAGNOSIS — M25.561 CHRONIC PAIN OF RIGHT KNEE: ICD-10-CM

## 2025-08-25 DIAGNOSIS — M25.511 ACUTE PAIN OF RIGHT SHOULDER: Primary | ICD-10-CM

## 2025-08-25 PROCEDURE — G2211 COMPLEX E/M VISIT ADD ON: HCPCS | Mod: S$GLB,,, | Performed by: STUDENT IN AN ORGANIZED HEALTH CARE EDUCATION/TRAINING PROGRAM

## 2025-08-25 PROCEDURE — 1101F PT FALLS ASSESS-DOCD LE1/YR: CPT | Mod: CPTII,S$GLB,, | Performed by: STUDENT IN AN ORGANIZED HEALTH CARE EDUCATION/TRAINING PROGRAM

## 2025-08-25 PROCEDURE — 3078F DIAST BP <80 MM HG: CPT | Mod: CPTII,S$GLB,, | Performed by: STUDENT IN AN ORGANIZED HEALTH CARE EDUCATION/TRAINING PROGRAM

## 2025-08-25 PROCEDURE — 1125F AMNT PAIN NOTED PAIN PRSNT: CPT | Mod: CPTII,S$GLB,, | Performed by: STUDENT IN AN ORGANIZED HEALTH CARE EDUCATION/TRAINING PROGRAM

## 2025-08-25 PROCEDURE — 99214 OFFICE O/P EST MOD 30 MIN: CPT | Mod: S$GLB,,, | Performed by: STUDENT IN AN ORGANIZED HEALTH CARE EDUCATION/TRAINING PROGRAM

## 2025-08-25 PROCEDURE — 3288F FALL RISK ASSESSMENT DOCD: CPT | Mod: CPTII,S$GLB,, | Performed by: STUDENT IN AN ORGANIZED HEALTH CARE EDUCATION/TRAINING PROGRAM

## 2025-08-25 PROCEDURE — 3044F HG A1C LEVEL LT 7.0%: CPT | Mod: CPTII,S$GLB,, | Performed by: STUDENT IN AN ORGANIZED HEALTH CARE EDUCATION/TRAINING PROGRAM

## 2025-08-25 PROCEDURE — 1160F RVW MEDS BY RX/DR IN RCRD: CPT | Mod: CPTII,S$GLB,, | Performed by: STUDENT IN AN ORGANIZED HEALTH CARE EDUCATION/TRAINING PROGRAM

## 2025-08-25 PROCEDURE — 1159F MED LIST DOCD IN RCRD: CPT | Mod: CPTII,S$GLB,, | Performed by: STUDENT IN AN ORGANIZED HEALTH CARE EDUCATION/TRAINING PROGRAM

## 2025-08-25 PROCEDURE — 99999 PR PBB SHADOW E&M-EST. PATIENT-LVL III: CPT | Mod: PBBFAC,,, | Performed by: STUDENT IN AN ORGANIZED HEALTH CARE EDUCATION/TRAINING PROGRAM

## 2025-08-25 PROCEDURE — 3074F SYST BP LT 130 MM HG: CPT | Mod: CPTII,S$GLB,, | Performed by: STUDENT IN AN ORGANIZED HEALTH CARE EDUCATION/TRAINING PROGRAM

## 2025-08-25 PROCEDURE — 3008F BODY MASS INDEX DOCD: CPT | Mod: CPTII,S$GLB,, | Performed by: STUDENT IN AN ORGANIZED HEALTH CARE EDUCATION/TRAINING PROGRAM

## 2025-08-25 RX ORDER — CYCLOBENZAPRINE HCL 5 MG
5 TABLET ORAL NIGHTLY PRN
Qty: 20 TABLET | Refills: 0 | Status: SHIPPED | OUTPATIENT
Start: 2025-08-25

## 2025-08-25 RX ORDER — MELOXICAM 7.5 MG/1
7.5 TABLET ORAL DAILY PRN
Qty: 20 TABLET | Refills: 0 | Status: SHIPPED | OUTPATIENT
Start: 2025-08-25

## 2025-08-26 ENCOUNTER — HOSPITAL ENCOUNTER (OUTPATIENT)
Dept: RADIOLOGY | Facility: CLINIC | Age: 70
Discharge: HOME | End: 2025-08-26
Attending: STUDENT IN AN ORGANIZED HEALTH CARE EDUCATION/TRAINING PROGRAM
Payer: MEDICARE

## 2025-08-26 DIAGNOSIS — M25.561 CHRONIC PAIN OF RIGHT KNEE: ICD-10-CM

## 2025-08-26 DIAGNOSIS — G89.29 CHRONIC PAIN OF RIGHT KNEE: ICD-10-CM

## 2025-08-26 DIAGNOSIS — M25.511 ACUTE PAIN OF RIGHT SHOULDER: ICD-10-CM

## 2025-08-26 PROCEDURE — 73560 X-RAY EXAM OF KNEE 1 OR 2: CPT | Mod: 26,RT,, | Performed by: STUDENT IN AN ORGANIZED HEALTH CARE EDUCATION/TRAINING PROGRAM

## 2025-08-26 PROCEDURE — 73560 X-RAY EXAM OF KNEE 1 OR 2: CPT | Mod: TC,PO,RT

## 2025-08-26 PROCEDURE — 73030 X-RAY EXAM OF SHOULDER: CPT | Mod: 26,RT,, | Performed by: STUDENT IN AN ORGANIZED HEALTH CARE EDUCATION/TRAINING PROGRAM

## 2025-08-26 PROCEDURE — 73030 X-RAY EXAM OF SHOULDER: CPT | Mod: TC,PO,RT

## 2025-08-31 DIAGNOSIS — M25.511 ACUTE PAIN OF RIGHT SHOULDER: Primary | ICD-10-CM

## (undated) DEVICE — UNDERPAD ULTRASORB 300LB 30X36

## (undated) DEVICE — SLEEVE TROCAR 5MMX100MM  CTS02

## (undated) DEVICE — TRAY GENERAL LAPAROSCOPY

## (undated) DEVICE — NEEDLE INSUFFLATION 120MM 172015

## (undated) DEVICE — SCRUB DYNA-HEX LIQ 4% CHG 4OZ

## (undated) DEVICE — YANKAUER OPEN TIP W/O VENT

## (undated) DEVICE — DRAPE UINDERBUT GRAD PCH

## (undated) DEVICE — APPLICATOR ENDOSCOPIC SURGICEL

## (undated) DEVICE — GLOVE SENSICARE PI GRN 6

## (undated) DEVICE — PAD BOVIE ADULT

## (undated) DEVICE — APPLIER CLIP  5MM

## (undated) DEVICE — PENCIL SMK EVAC CONNECTOR 10FT

## (undated) DEVICE — GLOVE SENSICARE PI ALOE 6.5

## (undated) DEVICE — SCISSORS 5MM APPLIED MEDICAL   CB030

## (undated) DEVICE — STRAP OR TABLE 5IN X 72IN

## (undated) DEVICE — PACK SURG LITHOTOMY 3 SIRUS

## (undated) DEVICE — WARMER SCOPE SEE SHARP

## (undated) DEVICE — SUTURE MONOCRYL 4-0 PS-2 27 MCP426H

## (undated) DEVICE — CABLE MONOPOLAR 10FT DISPOSABLE

## (undated) DEVICE — DERMABOND HVD MINI  DHVM12

## (undated) DEVICE — DRAPE ORTHOMAX BAR

## (undated) DEVICE — SYR 10CC LUER LOCK

## (undated) DEVICE — ELECTRODE OLSEN HOOK L-SHAPED 13INX330MM

## (undated) DEVICE — GLOVE SENSICARE PI GRN 6.5

## (undated) DEVICE — GOWN POLY REINF BRTH SLV LG

## (undated) DEVICE — PACK SET UP 190 OMC-NS

## (undated) DEVICE — DISSECTOR KITTNER 13300

## (undated) DEVICE — DRESSING TELFA N ADH 3X8

## (undated) DEVICE — TUBING SUC UNIV W/CONN 12FT

## (undated) DEVICE — JELLY SURGILUBE LUBE TUBE 2OZ

## (undated) DEVICE — SUTURE ETHIBOND 0 MO-7 18 CX41D

## (undated) DEVICE — TUBING SMOKE EVACUATOR LEEP

## (undated) DEVICE — AGENT HEMOSTATIC 3GM OXIDIZED REGENERATED CELLULOSE

## (undated) DEVICE — SYRINGE HYPODERMC LL 22G 1.5 ECLIPSE 30

## (undated) DEVICE — TRAY SKIN SCRUB DRY PREMIUM

## (undated) DEVICE — GLOVE BIOGEL MICRO SURGEON PINK SZ 7.5

## (undated) DEVICE — BOWL STERILE LARGE 32OZ

## (undated) DEVICE — ELECTRODE MEGADYNE RETURN DUAL

## (undated) DEVICE — TOWEL OR DISP STRL BLUE 4/PK

## (undated) DEVICE — SOLUTION IRRI NS BOTTLE 1000ML R5200-01

## (undated) DEVICE — SOLUTION IRRI H2O BOTTLE 1000ML

## (undated) DEVICE — GLOVE SENSICARE PI ALOE 6

## (undated) DEVICE — CATH URETHRAL RED 16FR

## (undated) DEVICE — ELECTRODE BALL DISP 5MM

## (undated) DEVICE — UNDERGLOVE BIOGEL PI MICRO BLUE SZ 8

## (undated) DEVICE — SET INSUFFLATION TUBING HIGH FLOW SMOKE EVACUATION PNEUMOCLE

## (undated) DEVICE — PAD SANITARY MAXI 11IN

## (undated) DEVICE — CONTAINER SPECIMEN OR STER 4OZ

## (undated) DEVICE — TROCAR OPTICAL ZTHREAD 5MMX100MM CTF03

## (undated) DEVICE — ELECTRODE LOOP 15X12X11 TNGSTN

## (undated) DEVICE — SUTURE VICRYL #0 27 UR-6 VCP603H